# Patient Record
Sex: MALE | Race: WHITE | NOT HISPANIC OR LATINO | Employment: FULL TIME | URBAN - METROPOLITAN AREA
[De-identification: names, ages, dates, MRNs, and addresses within clinical notes are randomized per-mention and may not be internally consistent; named-entity substitution may affect disease eponyms.]

---

## 2017-03-08 ENCOUNTER — DOCUMENTATION ONLY (OUTPATIENT)
Dept: SLEEP MEDICINE | Facility: CLINIC | Age: 52
End: 2017-03-08

## 2017-03-08 NOTE — PROGRESS NOTES
Patient was offered choice of vendor and chose [facility].  Patient Bharath Mercado was set up at [place] on March 8, 2017. Patient received a Bharti [employer]ics DreamStation Auto. Pressures were set at 7-13 cm H2O.   Patient s ramp is 7 cm H2O for Off and FLEX/EPR is 2.  Patient received a Bharti [employer]ironics Mask name: NUANCE PRO  Nasal mask Size Standard, heated tubing and heated humidifier.  Patient is enrolled in the STM Program and does not need to meet compliance. Patient has a follow up on 4/28/17 with Bennett Goltz, PA.    Lora Bustos

## 2017-03-13 ENCOUNTER — DOCUMENTATION ONLY (OUTPATIENT)
Dept: SLEEP MEDICINE | Facility: CLINIC | Age: 52
End: 2017-03-13

## 2017-03-13 NOTE — PROGRESS NOTES
3 DAY STM VISIT    Patient contacted for 3 day STM visit  Message left for patient to return call    Current settings:  EPAP Min Auto CPAP: 7 (CPAP Min Auto CPAP)       EPAP Max Auto CPAP: 13 (CPAP Max Auto CPAP)       Assessment:  Most nights over four hours.   Action plan: Pt to have f/u 14 day STM visit.

## 2017-03-13 NOTE — PROGRESS NOTES
Patient returned call.    Subjective measures:  Pt reports pain from the mask  first night. He is currently using a nasal pillow mask.  He states that this has gotten better.  No issues pressures or dryness. He will call back if soreness does not continue to improve.

## 2017-03-23 ENCOUNTER — DOCUMENTATION ONLY (OUTPATIENT)
Dept: SLEEP MEDICINE | Facility: CLINIC | Age: 52
End: 2017-03-23

## 2017-03-23 NOTE — PROGRESS NOTES
14 DAY [facility] VISIT    Message left for patient to return call     Assessment: Pt not meeting objective benchmarks for AHI and compliance.  Action plan: Waiting for patient to return call and pt to have 30 day STM visit.   Device settings:    EPAP Min Auto CPAP/ASV: 7 (CPAP Min Auto CPAP/ASV)    EPAP Max Auto CPAP/ASV: 13 (CPAP Max Auto CPAP/ASV)    Avg EPAP pressure (90th %ile) 14 day average (Bharti): 8.7cm H20    Objective measures: 14 day rolling measures      % compliance greater than four hours rolling average 14 days: 15.14954521044975782 %     Average % of night in large leak Rolling Average 14 days (BHARTI): 0/100 last data upload     AHI Rolling Average 14 Day: 8.99 last data upload      Time mask on face 14 day average: 129 min          Objective measure goal  Compliance   Goal >70%  Leak   Goal < 10%  AHI  Goal < 5  Usage  Goal >240

## 2017-04-10 ENCOUNTER — DOCUMENTATION ONLY (OUTPATIENT)
Dept: SLEEP MEDICINE | Facility: CLINIC | Age: 52
End: 2017-04-10

## 2017-04-10 NOTE — PROGRESS NOTES
30 DAY STM VISIT    Unable to leave message.     Assessment: Pt not meeting objective benchmarks for AHI and compliance.    Action plan: Pt place into sleep therapy coaching group and pt to have 6 month STM visit  Patient has a follow up visit with Bennett Goltz, PA on 4/26/2017.   Device settings:    EPAP Min Auto CPAP: 7 (CPAP Min Auto CPAP    EPAP Max Auto CPAP 13 (CPAP Max Auto CPAP    Avg EPAP pressure (90th %ile) 14 day average (Bharti): 8.8cm H20    Objective measures: 14 day rolling measures        % compliance greater than four hours rolling average 14 days: 28.43011501298873957 %         Average % of night in large leak Rolling Average 14 days (BHARTI): 0.03/100 last upload      AHI Rolling Average 14 Day: 8.4 last upload       Time mask on face 14 day average: 157 min        Objective measure goal  Compliance   Goal >70%  Leak   Goal < 10%  AHI  Goal < 5  Usage  Goal >240

## 2017-04-21 ENCOUNTER — DOCUMENTATION ONLY (OUTPATIENT)
Dept: SLEEP MEDICINE | Facility: CLINIC | Age: 52
End: 2017-04-21

## 2017-04-26 ENCOUNTER — OFFICE VISIT (OUTPATIENT)
Dept: SLEEP MEDICINE | Facility: CLINIC | Age: 52
End: 2017-04-26
Payer: COMMERCIAL

## 2017-04-26 VITALS
BODY MASS INDEX: 27.83 KG/M2 | HEIGHT: 73 IN | DIASTOLIC BLOOD PRESSURE: 75 MMHG | OXYGEN SATURATION: 98 % | HEART RATE: 67 BPM | SYSTOLIC BLOOD PRESSURE: 135 MMHG | WEIGHT: 210 LBS

## 2017-04-26 DIAGNOSIS — G47.33 OSA (OBSTRUCTIVE SLEEP APNEA): Primary | ICD-10-CM

## 2017-04-26 PROCEDURE — 99214 OFFICE O/P EST MOD 30 MIN: CPT | Performed by: PHYSICIAN ASSISTANT

## 2017-04-26 NOTE — MR AVS SNAPSHOT
After Visit Summary   4/26/2017    Bharath Mercado    MRN: [id number]           Patient Information     Date Of Birth          1965        Visit Information        Provider Department      4/26/2017 1:30 PM Goltz, Bennett Ezra, PA-C [facility]        Today's Diagnoses     EFE (obstructive sleep apnea)    -  1      Care Instructions      Your BMI is Body mass index is 28.09 kg/(m^2).  Weight management is a personal decision.  If you are interested in exploring weight loss strategies, the following discussion covers the approaches that may be successful. Body mass index (BMI) is one way to tell whether you are at a healthy weight, overweight, or obese. It measures your weight in relation to your height.  A BMI of 18.5 to 24.9 is in the healthy range. A person with a BMI of 25 to 29.9 is considered overweight, and someone with a BMI of 30 or greater is considered obese. More than two-thirds of American adults are considered overweight or obese.  Being overweight or obese increases the risk for further weight gain. Excess weight may lead to heart disease and diabetes.  Creating and following plans for healthy eating and physical activity may help you improve your health.  Weight control is part of healthy lifestyle and includes exercise, emotional health, and healthy eating habits. Careful eating habits lifelong are the mainstay of weight control. Though there are significant health benefits from weight loss, long-term weight loss with diet alone may be very difficult to achieve- studies show long-term success with dietary management in less than 10% of people. Attaining a healthy weight may be especially difficult to achieve in those with severe obesity. In some cases, medications, devices and surgical management might be considered.  What can you do?  If you are overweight or obese and are interested in methods for weight loss, you should discuss this with your provider.      Consider reducing daily calorie intake by 500 calories.     Keep a food journal.     Avoiding skipping meals, consider cutting portions instead.    Diet combined with exercise helps maintain muscle while optimizing fat loss. Strength training is particularly important for building and maintaining muscle mass. Exercise helps reduce stress, increase energy, and improves fitness. Increasing exercise without diet control, however, may not burn enough calories to loose weight.       Start walking three days a week 10-20 minutes at a time    Work towards walking thirty minutes five days a week     Eventually, increase the speed of your walking for 1-2 minutes at time    In addition, we recommend that you review healthy lifestyles and methods for weight loss available through the National Institutes of Health patient information sites:  http://win.niddk.nih.gov/publications/index.htm    And look into health and wellness programs that may be available through your health insurance provider, employer, local community center, or huma club.    Weight management plan: Patient was referred to their PCP to discuss a diet and exercise plan.            Follow-ups after your visit        Follow-up notes from your care team     Return in about 3 months (around 7/26/2017) for CPAP compliance recheck.      Your next 10 appointments already scheduled     Jul 26, 2017 10:30 AM CDT   Return Sleep Patient with Bennett Ezra Goltz, PA-C   [facility] ([facility])    [address] [place] [id number] [phone]              Who to contact     If you have questions or need follow up information about today's clinic visit or your schedule please contact [facility] [place] directly at [phone].  Normal or non-critical lab and imaging results will be communicated to you by MyChart, letter or phone within 4 business days after the clinic has received the results.  "If you do not hear from us within 7 days, please contact the clinic through [employer] or phone. If you have a critical or abnormal lab result, we will notify you by phone as soon as possible.  Submit refill requests through [employer] or call your pharmacy and they will forward the refill request to us. Please allow 3 business days for your refill to be completed.          Additional Information About Your Visit        Lin[employer] Information     [employer] gives you secure access to your electronic health record. If you see a primary care provider, you can also send messages to your care team and make appointments. If you have questions, please call your primary care clinic.  If you do not have a primary care provider, please call [phone] and they will assist you.        Care EveryWhere ID     This is your Care EveryWhere ID. This could be used by other organizations to access your [place] medical records  [id number]        Your Vitals Were     Pulse Height Pulse Oximetry BMI (Body Mass Index)          67 1.842 m (6' 0.5\") 98% 28.09 kg/m2         Blood Pressure from Last 3 Encounters:   04/26/17 135/75   09/28/16 145/89   04/27/16 136/86    Weight from Last 3 Encounters:   04/26/17 95.3 kg (210 lb)   09/28/16 95.3 kg (210 lb)   04/27/16 96.6 kg (213 lb)              Today, you had the following     No orders found for display       Primary Care Provider Office Phone # Fax #    Jose Enrique [phone]       [facility] [address] [place] [address]        Thank you!     Thank you for choosing [facility]  for your care. Our goal is always to provide you with excellent care. Hearing back from our patients is one way we can continue to improve our services. Please take a few minutes to complete the written survey that you may receive in the mail after your visit with us. Thank you!             Your Updated Medication List - Protect others around you: Learn how to safely " use, store and throw away your medicines at www.disposemymeds.org.          This list is accurate as of: 4/26/17  8:37 PM.  Always use your most recent med list.                   Brand Name Dispense Instructions for use    aspirin - buffered 325 MG Tabs tablet    ASCRIPTIN     Take 650 mg by mouth daily       ketoprofen 75 MG capsule    ORUDIS    90 capsule    Take 1 capsule (75 mg) by mouth 3 times daily as needed for pain       multivitamin, therapeutic with minerals Tabs tablet      Take 1 tablet by mouth daily

## 2017-04-26 NOTE — NURSING NOTE
"Chief Complaint   Patient presents with     Sleep Problem     Follow up psg       Initial /75  Pulse 67  Ht 1.842 m (6' 0.5\")  Wt 95.3 kg (210 lb)  SpO2 98%  BMI 28.09 kg/m2 Estimated body mass index is 28.09 kg/(m^2) as calculated from the following:    Height as of this encounter: 1.842 m (6' 0.5\").    Weight as of this encounter: 95.3 kg (210 lb).  Medication Reconciliation: complete   ESS 9/24  Slime Mcbride MA      "

## 2017-04-26 NOTE — PROGRESS NOTES
Sleep Study Follow-Up Visit:    Date on this visit: 4/26/2017    Bharath Mercado comes in today for follow-up of his CPAP use for moderate EFE. He was initially seen at the [facility] for snoring and stopping breathing in his sleep for years. His medical history is significant for borderline hyperlipidemia and lumbago.      AHI was 20.2/hr, with desaturations down to 88%. He spent 0.7 minutes below 90% SpO2. RDI 24.5/hr. REM RDI 4.5/hr EFE (almost all REM was lateral). Supine RDI 37.5/hr. His non-supine RDI was 7/hr, AHI 4.7/hr (77 minutes, 36.5 minutes of REM). Periodic Limb Movement Index 23/hour, 6.6/hr were associated with arousals.   He is on auto CPAP 7-13 cm. Initially, the mask was making his nose sore. He says sometimes the CPAP goes great and sometimes it is obnoxious. He often removes it in his sleep and is not aware of it. Sometimes he may remove it at 4-5 AM, knowingly. Sometimes he feels the pressure is trying to suffocate him. He has a Nuance nasal pillows mask. He denies dry nose or mouth. He does use the humidifier. He does not snore with it. He does not notice much mask leak. He is not sure if he feels better with it.     He has not been using the CPAP since about 4/6/17. [place] after [place]. He is just starting to feel healthy again. He has also been on call a lot lately, so has a hard time knowing if he feels any better with CPAP.     The compliance data shows that from 3/8/2017 to 4/6/2017, he used the CPAP for 25/30 nights, 26.7% of nights for >4 hours.  The 90th% pressure is 8.9 cm.  The average time in large leak is 4 min.  The average nightly usage is 3:13.  The average AHI is 8.2/hr (2.2/hr are centrals, 5.1/hr are hypopneas).      Past medical/surgical history, family history, social history, medications and allergies were reviewed.      Problem List:  Patient Active Problem List    Diagnosis Date Noted     EFE (obstructive sleep apnea)  09/28/2016     Priority: Medium     Lumbago 03/20/2013     Priority: Medium     Nonallopathic lesion of lumbar region 03/20/2013     Priority: Medium     Problem list name updated by automated process. Provider to review       CARDIOVASCULAR SCREENING; LDL GOAL LESS THAN 160 10/31/2010     Priority: Medium     Esophageal reflux 05/14/2003     Priority: Medium        Impression/Plan:    (G47.33) EFE (obstructive sleep apnea)  (primary encounter diagnosis)  Comment: Usage is low for a number of reasons. He will restart using CPAP tonight. He does not need to meet compliance goals.  Plan: Resume auto CPAP 7-13 cm. We reviewed how to adjust the humidity, ramp and Flex. He was encouraged to contact me if he becomes aware of any barriers to regular CPAP use prior to the next visit.      He will follow up with me in about 3 month(s).     Twenty-five minutes spent with patient, all of which were spent face-to-face counseling, consulting, coordinating plan of care.      Bennett Goltz, PA-C    CC: No ref. provider found

## 2017-04-26 NOTE — PATIENT INSTRUCTIONS

## 2017-09-12 ENCOUNTER — DOCUMENTATION ONLY (OUTPATIENT)
Dept: SLEEP MEDICINE | Facility: CLINIC | Age: 52
End: 2017-09-12

## 2017-09-12 NOTE — PROGRESS NOTES
6 Month [facility] visit    Message left for patient to return call    Objective measures: 14 day rolling measures     Device settings:    EPAP Min Auto CPAP/ASV: 7 (CPAP Min Auto CPAP/ASV)    EPAP Max Auto CPAP/ASV: 13 (CPAP Max Auto CPAP/ASV)    Avg EPAP pressure (90th %ile) 14 day average (Bharti): 9.1cm H20    Objective measures: 14 day rolling measures      Compliance   (Goal >70%)  % compliance greater than four hours rolling average 14 days: 7 %      Leak   (Goal < 10%)  Average fraction of night in large leak Rolling Average 14 days (BHARTI):1%       AHI  (Goal < 5)  AHI Rolling Average 14 Day: 7.6      Usage  (Goal >240)  Time mask on face 14 day average: 44 min    Assessment:Pt not meeting objective benchmarks for compliance   Action plan: waiting for patient to return call.  pt to follow up per provider request (1-2 yrs)

## 2017-09-13 ENCOUNTER — OFFICE VISIT (OUTPATIENT)
Dept: SLEEP MEDICINE | Facility: CLINIC | Age: 52
End: 2017-09-13
Payer: COMMERCIAL

## 2017-09-13 VITALS
OXYGEN SATURATION: 96 % | BODY MASS INDEX: 27.98 KG/M2 | RESPIRATION RATE: 16 BRPM | SYSTOLIC BLOOD PRESSURE: 124 MMHG | HEIGHT: 72 IN | DIASTOLIC BLOOD PRESSURE: 84 MMHG | HEART RATE: 80 BPM | WEIGHT: 206.6 LBS

## 2017-09-13 DIAGNOSIS — G47.33 OSA (OBSTRUCTIVE SLEEP APNEA): Primary | ICD-10-CM

## 2017-09-13 PROCEDURE — 99214 OFFICE O/P EST MOD 30 MIN: CPT | Performed by: PHYSICIAN ASSISTANT

## 2017-09-13 NOTE — MR AVS SNAPSHOT
After Visit Summary   9/13/2017    Bharath Mercado    MRN: [id number]           Patient Information     Date Of Birth          1965        Visit Information        Provider Department      9/13/2017 10:00 AM Goltz, Bennett Ezra, PA-C [facility]        Today's Diagnoses     EFE (obstructive sleep apnea)    -  1      Care Instructions        Your BMI is Body mass index is 28.02 kg/(m^2).  Weight management is a personal decision.  If you are interested in exploring weight loss strategies, the following discussion covers the approaches that may be successful. Body mass index (BMI) is one way to tell whether you are at a healthy weight, overweight, or obese. It measures your weight in relation to your height.  A BMI of 18.5 to 24.9 is in the healthy range. A person with a BMI of 25 to 29.9 is considered overweight, and someone with a BMI of 30 or greater is considered obese. More than two-thirds of American adults are considered overweight or obese.  Being overweight or obese increases the risk for further weight gain. Excess weight may lead to heart disease and diabetes.  Creating and following plans for healthy eating and physical activity may help you improve your health.  Weight control is part of healthy lifestyle and includes exercise, emotional health, and healthy eating habits. Careful eating habits lifelong are the mainstay of weight control. Though there are significant health benefits from weight loss, long-term weight loss with diet alone may be very difficult to achieve- studies show long-term success with dietary management in less than 10% of people. Attaining a healthy weight may be especially difficult to achieve in those with severe obesity. In some cases, medications, devices and surgical management might be considered.  What can you do?  If you are overweight or obese and are interested in methods for weight loss, you should discuss this with your provider.      Consider reducing daily calorie intake by 500 calories.     Keep a food journal.     Avoiding skipping meals, consider cutting portions instead.    Diet combined with exercise helps maintain muscle while optimizing fat loss. Strength training is particularly important for building and maintaining muscle mass. Exercise helps reduce stress, increase energy, and improves fitness. Increasing exercise without diet control, however, may not burn enough calories to loose weight.       Start walking three days a week 10-20 minutes at a time    Work towards walking thirty minutes five days a week     Eventually, increase the speed of your walking for 1-2 minutes at time    In addition, we recommend that you review healthy lifestyles and methods for weight loss available through the National Institutes of Health patient information sites:  http://win.niddk.nih.gov/publications/index.htm    And look into health and wellness programs that may be available through your health insurance provider, employer, local community center, or huma club.    Weight management plan: Patient was referred to their PCP to discuss a diet and exercise plan.              Follow-ups after your visit        Follow-up notes from your care team     Return in about 3 months (around 12/13/2017) for CPAP compliance recheck.      Your next 10 appointments already scheduled     Anthony 10, 2018 10:00 AM CST   Return Sleep Patient with Bennett Ezra Goltz, PA-C   [facility] ([facility])    [address] [place] [address] [phone]              Who to contact     If you have questions or need follow up information about today's clinic visit or your schedule please contact [facility] directly at [phone].  Normal or non-critical lab and imaging results will be communicated to you by MyChart, letter or phone within 4 business days after the clinic has received the results.  If you do not hear from us within 7 days, please contact the clinic through [email or web address] or phone. If you have a critical or abnormal lab result, we will notify you by phone as soon as possible.  Submit refill requests through [email or web address] or call your pharmacy and they will forward the refill request to us. Please allow 3 business days for your refill to be completed.          Additional Information About Your Visit        [email or web address] Information     [email or web address] gives you secure access to your electronic health record. If you see a primary care provider, you can also send messages to your care team and make appointments. If you have questions, please call your primary care clinic.  If you do not have a primary care provider, please call [phone] and they will assist you.        Care EveryWhere ID     This is your Care EveryWhere ID. This could be used by other organizations to access your [facility] medical records  [id number]        Your Vitals Were     Pulse Respirations Height Pulse Oximetry BMI (Body Mass Index)       80 16 1.829 m (6') 96% 28.02 kg/m2        Blood Pressure from Last 3 Encounters:   09/13/17 124/84   04/26/17 135/75   09/28/16 145/89    Weight from Last 3 Encounters:   09/13/17 93.7 kg (206 lb 9.6 oz)   04/26/17 95.3 kg (210 lb)   09/28/16 95.3 kg (210 lb)              Today, you had the following     No orders found for display       Primary Care Provider Office Phone # Fax #    Jose Enrique [phone] [address] [place] [address]        Equal Access to Services     RICARDO CHOI : Hadii rachel saini hadcharissao Sodottie, waaxda luqadaha, qaybta kaalmada long hall. So [facility] [phone].    ATENCIÓN: Si habla español, tiene a cross disposición servicios gratuitos de asistencia lingüística. Llame al [phone].    We comply with applicable federal civil rights laws and [place] laws. We do not discriminate on the basis of race, color,  national origin, age, disability sex, sexual orientation or gender identity.            Thank you!     Thank you for choosing [facility]  for your care. Our goal is always to provide you with excellent care. Hearing back from our patients is one way we can continue to improve our services. Please take a few minutes to complete the written survey that you may receive in the mail after your visit with us. Thank you!             Your Updated Medication List - Protect others around you: Learn how to safely use, store and throw away your medicines at www.disposemymeds.org.          This list is accurate as of: 9/13/17 10:55 AM.  Always use your most recent med list.                   Brand Name Dispense Instructions for use Diagnosis    ketoprofen 75 MG capsule    ORUDIS    90 capsule    Take 1 capsule (75 mg) by mouth 3 times daily as needed for pain    Lumbar strain, subsequent encounter       multivitamin, therapeutic with minerals Tabs tablet      Take 1 tablet by mouth daily

## 2017-09-13 NOTE — PROGRESS NOTES
Sleep Study Follow-Up Visit:    Date on this visit: 9/13/2017    Bharath Mercado comes in today for follow-up of his CPAP use for moderate EFE. He was initially seen at the [facility] for snoring and stopping breathing in his sleep for years. His medical history is significant for borderline hyperlipidemia and lumbago.       AHI was 20.2/hr, with desaturations down to 88%. He spent 0.7 minutes below 90% SpO2. RDI 24.5/hr. REM RDI 4.5/hr EFE (almost all REM was lateral). Supine RDI 37.5/hr. His non-supine RDI was 7/hr, AHI 4.7/hr (77 minutes, 36.5 minutes of REM). Periodic Limb Movement Index 23/hour, 6.6/hr were associated with arousals.   He is on auto CPAP 7-13 cm. He tried using CPAP for a little while but the mask was causing pain in his face. He removes the mask in his sleep. His wife likes that he does not snore with it. He does not notice feeling better when he can keep it on all night. He feels CPAP makes him stay on his back more and that is less comfortable. He uses a nasal pillows mask and it irritates his mustache. He does not think he has mouth leak. Sometimes he feels the pressure is panic inducing and other times the pressure feels natural.     He has been paying more attention to his sleep schedule and that seems to help his energy more than anything. He sleeps in an extra 30 minutes because one of his kids is now driving so he does not have to wake early to take them to school.     The compliance data shows that he has used the CPAP for 11/30 nights, 13.3% of nights for >4 hours.  The 90th% pressure is 9.1 cm.  The average time in large leak is 22 sec.  The average nightly usage is 3:15.  The average AHI is 9/hr (2.8/hr are centrals, 5.2/hr are hypopneas).    He really does not like the idea of a dental appliance. He thinks he is sleeping on his back less. He has recently been getting some hip pain when on his side.      Past medical/surgical history, family history, social  history, medications and allergies were reviewed.      Problem List:  Patient Active Problem List    Diagnosis Date Noted     EFE (obstructive sleep apnea) 09/28/2016     Priority: Medium     Lumbago 03/20/2013     Priority: Medium     Nonallopathic lesion of lumbar region 03/20/2013     Priority: Medium     Problem list name updated by automated process. Provider to review       CARDIOVASCULAR SCREENING; LDL GOAL LESS THAN 160 10/31/2010     Priority: Medium     Esophageal reflux 05/14/2003     Priority: Medium        Impression/Plan:    (G47.33) EFE (obstructive sleep apnea)  (primary encounter diagnosis)  Comment: CPAP compliance is very low. He has a number of small excuses. He does not notice much benefit from it and it irritates his face. He also removes it in his sleep.  Plan: He will meet with [employer] to look at new masks. We looked at masks online. He was most interested in a [employer] pillows mask because it keeps the hose out of the way. He is not interested in a dental appliance and lateral sleeping hurts his hip. We talked about ENT surgeries, but I would not highly recommend them. I told him it would probably just take time to get his body used to having a mask on.       He will follow up with me in about 3 month(s).     Twenty-five minutes spent with patient, all of which were spent face-to-face counseling, consulting, coordinating plan of care.      Bennett Goltz, PA-C    CC: Jose Enrique MD

## 2017-09-13 NOTE — NURSING NOTE
Chief Complaint   Patient presents with     Sleep Problem     cpap f/u       Initial /84  Pulse 80  Resp 16  Ht 1.829 m (6')  Wt 93.7 kg (206 lb 9.6 oz)  SpO2 96%  BMI 28.02 kg/m2 Estimated body mass index is 28.02 kg/(m^2) as calculated from the following:    Height as of this encounter: 1.829 m (6').    Weight as of this encounter: 93.7 kg (206 lb 9.6 oz).  Medication Reconciliation: complete     ESS 8  Guerda Ocampo

## 2017-09-13 NOTE — PATIENT INSTRUCTIONS

## 2017-12-28 DIAGNOSIS — S39.012D LUMBAR STRAIN, SUBSEQUENT ENCOUNTER: ICD-10-CM

## 2017-12-28 NOTE — TELEPHONE ENCOUNTER
ketoprofen (ORUDIS) 75 MG capsule  Last Written Prescription Date:  10/14/2016  Last Fill Quantity: 90,   # refills: 3  Last Office Visit:09/16/2015  Future Office visit:       Routing refill request to provider for review/approval because:  Drug not on the [facility], [facility] or [facility] refill protocol or controlled substance

## 2018-01-03 RX ORDER — KETOPROFEN 75 MG/1
CAPSULE ORAL
Qty: 90 CAPSULE | Refills: 3 | Status: SHIPPED | OUTPATIENT
Start: 2018-01-03 | End: 2021-09-22

## 2018-01-03 NOTE — TELEPHONE ENCOUNTER
Routing refill request to provider for review/approval because:  PATIENT HAS NOT BEEN SEEN FOR MORE THAN 2 YEARS.    Tana TONG RN, BSN, PHN  [place] [facility] RN

## 2018-04-30 ENCOUNTER — APPOINTMENT (OUTPATIENT)
Dept: MRI IMAGING | Facility: CLINIC | Age: 53
End: 2018-04-30
Attending: EMERGENCY MEDICINE
Payer: COMMERCIAL

## 2018-04-30 ENCOUNTER — HOSPITAL ENCOUNTER (EMERGENCY)
Facility: CLINIC | Age: 53
Discharge: HOME | End: 2018-04-30
Attending: EMERGENCY MEDICINE | Admitting: EMERGENCY MEDICINE
Payer: COMMERCIAL

## 2018-04-30 VITALS
RESPIRATION RATE: 16 BRPM | SYSTOLIC BLOOD PRESSURE: 147 MMHG | DIASTOLIC BLOOD PRESSURE: 93 MMHG | HEART RATE: 69 BPM | OXYGEN SATURATION: 99 %

## 2018-04-30 DIAGNOSIS — R20.2 NUMBNESS AND TINGLING OF LEFT SIDE OF FACE: ICD-10-CM

## 2018-04-30 DIAGNOSIS — R20.0 NUMBNESS AND TINGLING OF LEFT SIDE OF FACE: ICD-10-CM

## 2018-04-30 LAB
ANION GAP SERPL CALCULATED.3IONS-SCNC: 8 MMOL/L (ref 3–14)
APTT PPP: 27 SEC (ref 22–37)
BASOPHILS # BLD AUTO: 0 10E9/L (ref 0–0.2)
BASOPHILS NFR BLD AUTO: 0.6 %
BUN SERPL-MCNC: 15 MG/DL (ref 7–30)
CALCIUM SERPL-MCNC: 8.6 MG/DL (ref 8.5–10.1)
CHLORIDE SERPL-SCNC: 104 MMOL/L (ref 94–109)
CO2 SERPL-SCNC: 26 MMOL/L (ref 20–32)
CREAT SERPL-MCNC: 1 MG/DL (ref 0.66–1.25)
DIFFERENTIAL METHOD BLD: NORMAL
EOSINOPHIL # BLD AUTO: 0.2 10E9/L (ref 0–0.7)
EOSINOPHIL NFR BLD AUTO: 2.5 %
ERYTHROCYTE [DISTWIDTH] IN BLOOD BY AUTOMATED COUNT: 14.2 % (ref 10–15)
GFR SERPL CREATININE-BSD FRML MDRD: 78 ML/MIN/1.7M2
GLUCOSE BLDC GLUCOMTR-MCNC: 75 MG/DL (ref 70–99)
GLUCOSE SERPL-MCNC: 78 MG/DL (ref 70–99)
HCT VFR BLD AUTO: 48.4 % (ref 40–53)
HGB BLD-MCNC: 16 G/DL (ref 13.3–17.7)
IMM GRANULOCYTES # BLD: 0 10E9/L (ref 0–0.4)
IMM GRANULOCYTES NFR BLD: 0.3 %
INR PPP: 0.91 (ref 0.86–1.14)
LYMPHOCYTES # BLD AUTO: 2.3 10E9/L (ref 0.8–5.3)
LYMPHOCYTES NFR BLD AUTO: 31.9 %
MCH RBC QN AUTO: 28.8 PG (ref 26.5–33)
MCHC RBC AUTO-ENTMCNC: 33.1 G/DL (ref 31.5–36.5)
MCV RBC AUTO: 87 FL (ref 78–100)
MONOCYTES # BLD AUTO: 0.5 10E9/L (ref 0–1.3)
MONOCYTES NFR BLD AUTO: 6.3 %
NEUTROPHILS # BLD AUTO: 4.2 10E9/L (ref 1.6–8.3)
NEUTROPHILS NFR BLD AUTO: 58.4 %
NRBC # BLD AUTO: 0 10*3/UL
NRBC BLD AUTO-RTO: 0 /100
PLATELET # BLD AUTO: 274 10E9/L (ref 150–450)
POTASSIUM SERPL-SCNC: 4.1 MMOL/L (ref 3.4–5.3)
RBC # BLD AUTO: 5.55 10E12/L (ref 4.4–5.9)
SODIUM SERPL-SCNC: 138 MMOL/L (ref 133–144)
TROPONIN I SERPL-MCNC: <0.015 UG/L (ref 0–0.04)
WBC # BLD AUTO: 7.1 10E9/L (ref 4–11)

## 2018-04-30 PROCEDURE — 85730 THROMBOPLASTIN TIME PARTIAL: CPT | Performed by: EMERGENCY MEDICINE

## 2018-04-30 PROCEDURE — 85025 COMPLETE CBC W/AUTO DIFF WBC: CPT | Performed by: EMERGENCY MEDICINE

## 2018-04-30 PROCEDURE — 00000146 ZZHCL STATISTIC GLUCOSE BY METER IP

## 2018-04-30 PROCEDURE — 25000128 H RX IP 250 OP 636: Performed by: EMERGENCY MEDICINE

## 2018-04-30 PROCEDURE — 93005 ELECTROCARDIOGRAM TRACING: CPT

## 2018-04-30 PROCEDURE — 99285 EMERGENCY DEPT VISIT HI MDM: CPT | Mod: 25

## 2018-04-30 PROCEDURE — 85610 PROTHROMBIN TIME: CPT | Performed by: EMERGENCY MEDICINE

## 2018-04-30 PROCEDURE — 84484 ASSAY OF TROPONIN QUANT: CPT | Performed by: EMERGENCY MEDICINE

## 2018-04-30 PROCEDURE — A9585 GADOBUTROL INJECTION: HCPCS | Performed by: RADIOLOGY

## 2018-04-30 PROCEDURE — 25000128 H RX IP 250 OP 636: Performed by: RADIOLOGY

## 2018-04-30 PROCEDURE — 70553 MRI BRAIN STEM W/O & W/DYE: CPT

## 2018-04-30 PROCEDURE — 80048 BASIC METABOLIC PNL TOTAL CA: CPT | Performed by: EMERGENCY MEDICINE

## 2018-04-30 RX ORDER — GADOBUTROL 604.72 MG/ML
10 INJECTION INTRAVENOUS ONCE
Status: COMPLETED | OUTPATIENT
Start: 2018-04-30 | End: 2018-04-30

## 2018-04-30 RX ADMIN — GADOBUTROL 10 ML: 604.72 INJECTION INTRAVENOUS at 13:00

## 2018-04-30 RX ADMIN — SODIUM CHLORIDE 500 ML: 9 INJECTION, SOLUTION INTRAVENOUS at 12:28

## 2018-04-30 ASSESSMENT — ENCOUNTER SYMPTOMS
SHORTNESS OF BREATH: 0
DIZZINESS: 0
WEAKNESS: 0
LIGHT-HEADEDNESS: 0
NAUSEA: 0
VOMITING: 0
FACIAL ASYMMETRY: 0
NUMBNESS: 1

## 2018-04-30 NOTE — ED PROVIDER NOTES
"  History     Chief Complaint:  Left-Sided Numbness    The history is provided by the patient.    Bharath Mercado is a 52 year old male who presents to the emergency department today for evaluation of left sided numbness. The patient reports at 0700 this morning, five hours prior to arrival, while brushing his teeth he felt the left side of his face go numb which he describes as from middle lips, teeth, and gums, and radiating to the left side. This \"severely\" lasted for approximately one minute, and had faded away but still doesn't feel normal. The severe numbness returned again at 0800 and again lasted for about a minute. Due to persistent facial numbness, he presents to the ED for further evaluation. Upon presentation, he states his left front tooth area \"feels funny\" compared to the right and his left face still doesn't feel back to baseline although is \"95%\" better. Additionally, he took an aspirin this morning and does not normally take it. The patient denies extremity symptoms, shortness of breath, lightheadedness, dizziness, room spinning sensation, nausea, and vomiting.     Allergies:  No Known Drug Allergies     Medications:    ketoprofen (ORUDIS) 75 MG capsule    Past Medical History:    Borderline hyperlipidemia  Obstructive sleep apnea  Lumbago  Esophageal reflux    Past Surgical History:    Appendectomy  Hernia repair - right inguinal  [place] teeth extraction    Family History:    Cerebrovascular disease  Bladder cancer  Lentigo maligna    Social History:  The patient was alone.  Smoking Status: Never  Smokeless Tobacco: Never  Alcohol Use: Yes, 2x weekly  Marital Status:        Review of Systems   Respiratory: Negative for shortness of breath.    Cardiovascular: Negative for chest pain.   Gastrointestinal: Negative for nausea and vomiting.   Neurological: Positive for numbness (left face). Negative for dizziness, facial asymmetry, weakness and light-headedness.   All other systems reviewed and " are negative.    Physical Exam     Patient Vitals for the past 24 hrs:   BP Pulse Resp SpO2   04/30/18 1404 (!) 147/93 69 16 99 %   04/30/18 1355 (!) 147/93 - - 99 %   04/30/18 1313 (!) 136/98 - - 97 %   04/30/18 1215 (!) 168/91 64 18 98 %     Physical Exam  Constitutional: Patient appears well-developed and well-nourished.   HENT:   Head: No external signs of trauma noted.  Eyes: Pupils are equal, round, and reactive to light.   Cardiovascular: Normal rate, regular rhythm, normal heart sounds and intact distal pulses.    Pulmonary/Chest: Effort normal and breath sounds normal. No respiratory distress. No wheezes noted.   Abdominal: Soft. There is no tenderness. There is no rebound.   Musculoskeletal:   No deformities appreciated   No edema noted  Neurological:    Patient is alert and oriented to person, place, and time.    Speech is fluent, cognition is normal.   CN 2-12 appear normal and intact except for a decreased left facial sensation noted most prominently on the left cheek and mandible.    RUE strength 5/5: , finger abd, wrist flex/ext, elbow flex/ext.    LUE strength 5/5: , finger abd, wrist flex/ext, elbow flex/ext.    RLE strength 5/5: ankle flex/ext, knee flex/ext, hip flex.    LLE strength 5/5: ankle flex/ext, knee flex/ext, hip flex.    Sensation equal in all 4 extremities.    No arm drift.     Cerebellar: Normal rapid alternating movements     ( finger-nose-finger, rapid pronation/supination, hand rolling)    Normal heel-to-shin   Normal gait.   Skin: Skin is warm and dry.     Emergency Department Course     ECG:  Indication: Numbness  Completed at 1229.  Read at 1234.   Normal sinus rhythm  Septal infarct, age undetermined  Abnormal ECG  No significant change compared to EKG dated 2/14/16   Rate 65 bpm. GA interval 148. QRS duration 64. QT/QTc 404/420. P-R-T axes 56 34 60.    Imaging:  Radiology findings were communicated with the patient who voiced understanding of the findings.  MR Brain  w/o & w Contrast   IMPRESSION:   1. Minimal nonspecific chronic white matter disease.  2. Nothing acute.   Report per radiology     Laboratory:  Laboratory findings were communicated with the patient who voiced understanding of the findings.  CBC: WNL. (WBC 7.1, HGB 16.0, )   BMP: AWNL (Creatinine 1.00)  INR: 0.91  PTT: 27  Troponin (Collected 1220): <0.015  Glucose by meter: 75       Interventions:  1228 NS Bolus 500mLs IV     Emergency Department Course:  Nursing notes and vitals reviewed.  The patient was sent for a MR Brain w/o & w Contrast while in the emergency department, results above.   IV was inserted and blood was drawn for laboratory testing, results above.  1214: I performed an exam of the patient as documented above.   1232: I spoke with Dr. Schroeder of the Neurology service regarding patient's presentation, findings, and plan of care.  1235: I spoke with the MRI tech.   1326: I spoke with Dr. Gonzalez of Neurology again regarding the MRI results and plan of care. He will review the MRI and call back.   1327: Patient rechecked and updated.   1353: I spoke with Dr. Gonzalez.   Findings and plan explained to the Patient. Patient discharged home with instructions regarding supportive care, medications, and reasons to return. The importance of close follow-up was reviewed.   I personally reviewed the laboratory and imaging results with the Patient and answered all related questions prior to discharge.    Impression & Plan      Medical Decision Making:  The patient presents to the ED due to left facial numbness. Please see the HPI and exam for specifics. The patient has remained well in the ED. His exam has remained stable. He notes there is some subtle sensation difference between the left and the right side and that is has improved, but not totally gone away. The rest of his neurological exam appears normal. MRI does not show anything acute. There were a couple T2 hyperintensity signals and in discussion  with neurology likely represents possible sequela from small vessel ischemic change. The patient's blood pressure has been elevated in the ED and he notes he is usually in the 130's/70's. I have encouraged to follow up closely with his primary doctor in the outpatient setting for repeat evaluation of this and if it is still high then consideration for initiation of antihypertensive medication. The patient otherwise remains well and at this time I believe he can be discharged, but I did discuss return should there be any worsening symptoms. Anticipatory guidance given prior to discharge.     Diagnosis:    ICD-10-CM    1. Numbness and tingling of left side of face R20.0     R20.2        Disposition:  Discharged to home    Scribe Disclosure:  MIMI, Avelina Pablo, am serving as a scribe at 12:12 PM on 4/30/2018 to document services personally performed by Brian Sun DO based on my observations and the provider's statements to me.     4/30/2018   [facility] EMERGENCY DEPARTMENT       Brian Sun DO  04/30/18 [id number]

## 2018-04-30 NOTE — ED AVS SNAPSHOT
" [facility] Emergency Department    [address]    [facility] [address]    Phone:  [phone]    Fax:  [phone]                                       Bharath Mercado   MRN: [id number]    Department:  [facility] Emergency Department   Date of Visit:  4/30/2018           Patient Information     Date Of Birth          1965        Your diagnoses for this visit were:     Numbness and tingling of left side of face        You were seen by Brian Sun DO.      Follow-up Information     Call Jose Enrique MD.    Specialties:  Internal Medicine, Pediatrics    Why:  To establish a follow up appointment regarding symptoms today and re-evaluation of blood pressure    Contact information:    [facility] [place] [address]  [phone]          Follow up with [facility] Emergency Department.    Specialty:  EMERGENCY MEDICINE    Why:  If symptoms worsen    Contact information:    [address]  [facility] [id number] [phone]        Discharge Instructions         Paraesthesias  Paraesthesia is a burning or prickling sensation that is sometimes felt in the hands, arms, legs or feet. It can also occur in other parts of the body. It can also feel like tingling or numbness, skin crawling, or itching. The feeling is not comfortable, but it is not painful. (The \"pins and needles\" feeling that happens when a foot or hand \"falls asleep\" is a temporary paraesthesia.)  Paraesthesias that last or come and go may be caused by medical issues that need to be treated. These include stroke, a bulging disk pressing on a nerve, a trapped nerve, vitamin deficiencies, or even certain medicines.  Tests are often done. These tests may include blood tests, X-ray, CT (computerized tomography) scan, or a muscle test (electromyography). Depending on the cause, treatment may include physical therapy.  Home care    Tell the healthcare provider " about all medicines you take. This includes prescription and over-the-counter medicines, vitamins, and herbs. Ask if any of the medicines may be causing your problems. Do not make any changes to prescription medicines without talking to your healthcare provider first.    You may be prescribed medicines to help relieve the tingling feeling or for pain. Take all medicines as directed.    A numb hand or foot may be more prone to injury. To help protect it:  ? Always use oven mitts.  ? Test water with an unaffected hand or foot.  ? Use caution when trimming nails. File sharp areas.  ? Wear shoes that fit well to avoid pressure points, blisters, and ulcers.  ? Inspect your hands and feet carefully (including the soles of your feet and between your toes) at least once a week. If you see red areas, sores, or other problems, tell your healthcare provider.  Follow-up care  Follow up with your doctor or as advised by our staff. You may need further testing or evaluation.  When to seek medical advice  Call your healthcare provider right away if any of the following occur:    Numbness or weakness of the face, one arm, or one leg    Slurred speech, confusion, trouble speaking, walking, or seeing    Severe headache, fainting spell, dizziness, or seizure    Chest, arm, neck, or upper back pain    Loss of bladder or bowel control    Open wound with redness, swelling, or pus  Date Last Reviewed: 9/25/2015 2000-2017 [employer]. [address] [place], [place] [address]. All rights reserved. This information is not intended as a substitute for professional medical care. Always follow your healthcare professional's instructions.          24 Hour Appointment Hotline       To make an appointment at any [facility], call [phone] ([phone]). If you don't have a family doctor or clinic, we will help you find one. [facility] clinics are conveniently located to serve the needs of you and your family.              Review of your medicines      Our records show that you are taking the medicines listed below. If these are incorrect, please call your family doctor or clinic.        Dose / Directions Last dose taken    ketoprofen 75 MG capsule   Commonly known as:  ORUDIS   Quantity:  90 capsule        TAKE ONE CAPSULE BY MOUTH THREE TIMES A DAY AS NEEDED FOR PAIN   Refills:  3        multivitamin, therapeutic with minerals Tabs tablet   Dose:  1 tablet        Take 1 tablet by mouth daily   Refills:  0                Procedures and tests performed during your visit     Activity: Bedrest    Basic metabolic panel    CBC with platelets differential    Dysphagia Screen    EKG 12-lead, tracing only    Glucose by meter    Glucose monitor nursing POCT    INR    MR Brain w/o & w Contrast    Notify CT that Stroke patient is in ED    Partial thromboplastin time    Pulse oximetry nursing    Troponin I    Vital signs and neuro checks      Orders Needing Specimen Collection     None      Pending Results     Date and Time Order Name Status Description    4/30/2018 1220 EKG 12-lead, tracing only Preliminary             Pending Culture Results     No orders found from 4/28/2018 to 5/1/2018.            Pending Results Instructions     If you had any lab results that were not finalized at the time of your Discharge, you can call the ED Lab Result RN at [phone]. You will be contacted by this team for any positive Lab results or changes in treatment. The nurses are available 7 days a week from 10A to 6:30P.  You can leave a message 24 hours per day and they will return your call.        Test Results From Your Hospital Stay        4/30/2018 12:38 PM      Component Results     Component Value Ref Range & Units Status    WBC 7.1 4.0 - 11.0 10e9/L Final    RBC Count 5.55 4.4 - 5.9 10e12/L Final    Hemoglobin 16.0 13.3 - 17.7 g/dL Final    Hematocrit 48.4 40.0 - 53.0 % Final    MCV 87 78 - 100 fl Final    MCH 28.8 26.5 - 33.0 pg Final    MCHC 33.1  31.5 - 36.5 g/dL Final    RDW 14.2 10.0 - 15.0 % Final    Platelet Count 274 150 - 450 10e9/L Final    Diff Method Automated Method  Final    % Neutrophils 58.4 % Final    % Lymphocytes 31.9 % Final    % Monocytes 6.3 % Final    % Eosinophils 2.5 % Final    % Basophils 0.6 % Final    % Immature Granulocytes 0.3 % Final    Nucleated RBCs 0 0 /100 Final    Absolute Neutrophil 4.2 1.6 - 8.3 10e9/L Final    Absolute Lymphocytes 2.3 0.8 - 5.3 10e9/L Final    Absolute Monocytes 0.5 0.0 - 1.3 10e9/L Final    Absolute Eosinophils 0.2 0.0 - 0.7 10e9/L Final    Absolute Basophils 0.0 0.0 - 0.2 10e9/L Final    Abs Immature Granulocytes 0.0 0 - 0.4 10e9/L Final    Absolute Nucleated RBC 0.0  Final         4/30/2018 12:57 PM      Component Results     Component Value Ref Range & Units Status    Sodium 138 133 - 144 mmol/L Final    Potassium 4.1 3.4 - 5.3 mmol/L Final    Chloride 104 94 - 109 mmol/L Final    Carbon Dioxide 26 20 - 32 mmol/L Final    Anion Gap 8 3 - 14 mmol/L Final    Glucose 78 70 - 99 mg/dL Final    Urea Nitrogen 15 7 - 30 mg/dL Final    Creatinine 1.00 0.66 - 1.25 mg/dL Final    GFR Estimate 78 >60 mL/min/1.7m2 Final    Non  GFR Calc    GFR Estimate If Black >90 >60 mL/min/1.7m2 Final    African American GFR Calc    Calcium 8.6 8.5 - 10.1 mg/dL Final         4/30/2018 12:50 PM      Component Results     Component Value Ref Range & Units Status    INR 0.91 0.86 - 1.14 Final         4/30/2018 12:50 PM      Component Results     Component Value Ref Range & Units Status    PTT 27 22 - 37 sec Final         4/30/2018 12:57 PM      Component Results     Component Value Ref Range & Units Status    Troponin I ES <0.015 0.000 - 0.045 ug/L Final    The 99th percentile for upper reference range is 0.045 ug/L.  Troponin values   in the range of 0.045 - 0.120 ug/L may be associated with risks of adverse   clinical events.           4/30/2018  1:29 PM      Narrative     MRI BRAIN WITHOUT AND WITH CONTRAST  April 30, 2018 1:08 PM    HISTORY: Left facial numbness.     TECHNIQUE: Multiplanar, multisequence MRI of the brain without and  with 10 mL Gadavist.    COMPARISON: None.    FINDINGS: There are a few tiny foci of T2 hyperintensity in the  subcortical region of each cerebral hemisphere, nonspecific but likely  due to minimal small vessel ischemic change. There is no intracranial  hemorrhage, mass, or recent infarct. There are no gadolinium enhancing  lesions.     The facial structures appear normal. The arteries at the base of the  brain and the dural venous sinuses appear patent.         Impression     IMPRESSION:   1. Minimal nonspecific chronic white matter disease.  2. Nothing acute.     JEEVAN MICHELLE MD         4/30/2018 12:26 PM      Component Results     Component Value Ref Range & Units Status    Glucose 75 70 - 99 mg/dL Final                Clinical Quality Measure: Blood Pressure Screening     Your blood pressure was checked while you were in the emergency department today. The last reading we obtained was  BP: (!) 136/98 . Please read the guidelines below about what these numbers mean and what you should do about them.  If your systolic blood pressure (the top number) is less than 120 and your diastolic blood pressure (the bottom number) is less than 80, then your blood pressure is normal. There is nothing more that you need to do about it.  If your systolic blood pressure (the top number) is 120-139 or your diastolic blood pressure (the bottom number) is 80-89, your blood pressure may be higher than it should be. You should have your blood pressure rechecked within a year by a primary care provider.  If your systolic blood pressure (the top number) is 140 or greater or your diastolic blood pressure (the bottom number) is 90 or greater, you may have high blood pressure. High blood pressure is treatable, but if left untreated over time it can put you at risk for heart attack, stroke, or kidney failure.  You should have your blood pressure rechecked by a primary care provider within the next 4 weeks.  If your provider in the emergency department today gave you specific instructions to follow-up with your doctor or provider even sooner than that, you should follow that instruction and not wait for up to 4 weeks for your follow-up visit.        Thank you for choosing [facility]       Thank you for choosing [facility] for your care. Our goal is always to provide you with excellent care. Hearing back from our patients is one way we can continue to improve our services. Please take a few minutes to complete the written survey that you may receive in the mail after you visit with us. Thank you!        [employer] Information     [employer] gives you secure access to your electronic health record. If you see a primary care provider, you can also send messages to your care team and make appointments. If you have questions, please call your primary care clinic.  If you do not have a primary care provider, please call [phone] and they will assist you.        Care EveryWhere ID     This is your Care EveryWhere ID. This could be used by other organizations to access your [facility] medical records  [id number]        Equal Access to Services     LISA CHOI : Hadlisa [place], misael jeffrey, long jean. So [facility] [phone].    ATENCIÓN: Si habla español, tiene a cross disposición servicios gratuitos de asistencia lingüística. Llame al [phone].    We comply with applicable federal civil rights laws and [place] laws. We do not discriminate on the basis of race, color, national origin, age, disability, sex, sexual orientation, or gender identity.            After Visit Summary       This is your record. Keep this with you and show to your community pharmacist(s) and doctor(s) at your next visit.

## 2018-04-30 NOTE — ED TRIAGE NOTES
"Pt presents with left facial numbness. Onset at 0700.Reports \" I was brushing my teeth and my teeth, lip, gums on my left side went numb. It lasted about one minute. It faded but didn't go totally away. Then it occurred again at 0800. Took aspirin. \" The numbness feels like it is gone 95%\" Denies HA, dizziness or room spinning.   "

## 2018-04-30 NOTE — DISCHARGE INSTRUCTIONS
"  Paraesthesias  Paraesthesia is a burning or prickling sensation that is sometimes felt in the hands, arms, legs or feet. It can also occur in other parts of the body. It can also feel like tingling or numbness, skin crawling, or itching. The feeling is not comfortable, but it is not painful. (The \"pins and needles\" feeling that happens when a foot or hand \"falls asleep\" is a temporary paraesthesia.)  Paraesthesias that last or come and go may be caused by medical issues that need to be treated. These include stroke, a bulging disk pressing on a nerve, a trapped nerve, vitamin deficiencies, or even certain medicines.  Tests are often done. These tests may include blood tests, X-ray, CT (computerized tomography) scan, or a muscle test (electromyography). Depending on the cause, treatment may include physical therapy.  Home care    Tell the healthcare provider about all medicines you take. This includes prescription and over-the-counter medicines, vitamins, and herbs. Ask if any of the medicines may be causing your problems. Do not make any changes to prescription medicines without talking to your healthcare provider first.    You may be prescribed medicines to help relieve the tingling feeling or for pain. Take all medicines as directed.    A numb hand or foot may be more prone to injury. To help protect it:  ? Always use oven mitts.  ? Test water with an unaffected hand or foot.  ? Use caution when trimming nails. File sharp areas.  ? Wear shoes that fit well to avoid pressure points, blisters, and ulcers.  ? Inspect your hands and feet carefully (including the soles of your feet and between your toes) at least once a week. If you see red areas, sores, or other problems, tell your healthcare provider.  Follow-up care  Follow up with your doctor or as advised by our staff. You may need further testing or evaluation.  When to seek medical advice  Call your healthcare provider right away if any of the following " occur:    Numbness or weakness of the face, one arm, or one leg    Slurred speech, confusion, trouble speaking, walking, or seeing    Severe headache, fainting spell, dizziness, or seizure    Chest, arm, neck, or upper back pain    Loss of bladder or bowel control    Open wound with redness, swelling, or pus  Date Last Reviewed: 9/25/2015 2000-2017 [employer]. [address] [place] [address]. All rights reserved. This information is not intended as a substitute for professional medical care. Always follow your healthcare professional's instructions.

## 2018-04-30 NOTE — ED AVS SNAPSHOT
[facility]    [address]    [facility] [address]    Phone:  [phone]    Fax:  [phone]                                       Bharath Mercado   MRN: [id number]    Department:  [facility]   Date of Visit:  4/30/2018           After Visit Summary Signature Page     I have received my discharge instructions, and my questions have been answered. I have discussed any challenges I see with this plan with the nurse or doctor.    ..........................................................................................................................................  Patient/Patient Representative Signature      ..........................................................................................................................................  Patient Representative Print Name and Relationship to Patient    ..................................................               ................................................  Date                                            Time    ..........................................................................................................................................  Reviewed by Signature/Title    ...................................................              ..............................................  Date                                                            Time

## 2018-05-01 LAB — INTERPRETATION ECG - MUSE: NORMAL

## 2018-05-22 ENCOUNTER — TELEPHONE (OUTPATIENT)
Dept: NEUROLOGY | Facility: CLINIC | Age: 53
End: 2018-05-22

## 2018-05-22 NOTE — TELEPHONE ENCOUNTER
[facility] Call Center    Phone Message    May a detailed message be left on voicemail: yes    Reason for Call: Other: Pt saw Dr Schroeder in [facility] on 4/30 and he reviewed his MRI. Pt calling in to get scheduled with him again. I attempted to schedule but did not have anything come up on his template. Please call back pt to discuss options. Thanks     Action Taken: Message routed to:  [facility] ([facility] Neuro

## 2018-05-22 NOTE — TELEPHONE ENCOUNTER
Spoke with patient and advised him that Dr. Schroeder is booked out until October 2018 for new appointments. Offered patient an appointment on 10/9/18 and to put him on the cancellation list, however patient stated he will call back. No appointment was scheduled.

## 2018-05-23 ENCOUNTER — TELEPHONE (OUTPATIENT)
Dept: PEDIATRICS | Facility: CLINIC | Age: 53
End: 2018-05-23

## 2018-05-23 NOTE — TELEPHONE ENCOUNTER
Patient called.     Still having mildly the same symptoms on left side of face. He is not able to see Neuro until October.     He would like to discuss possible steps in the meantime.     Please call back; ok to leave a message  Telephone Information:   Mobile [phone]     Tana TONG RN, BSN, PHN  [place] [facility] RN

## 2019-10-15 ASSESSMENT — ENCOUNTER SYMPTOMS
HEADACHES: 0
WEAKNESS: 0
ABDOMINAL PAIN: 0
PARESTHESIAS: 0
SHORTNESS OF BREATH: 0
JOINT SWELLING: 0
CHILLS: 0
NAUSEA: 0
FEVER: 0
COUGH: 0
SORE THROAT: 0
DYSURIA: 0
DIARRHEA: 0
HEMATURIA: 0
FREQUENCY: 0
EYE PAIN: 0
DIZZINESS: 0
PALPITATIONS: 0
HEARTBURN: 0
MYALGIAS: 0
CONSTIPATION: 0
NERVOUS/ANXIOUS: 0
HEMATOCHEZIA: 0
ARTHRALGIAS: 1

## 2019-10-16 ENCOUNTER — OFFICE VISIT (OUTPATIENT)
Dept: PEDIATRICS | Facility: CLINIC | Age: 54
End: 2019-10-16
Payer: COMMERCIAL

## 2019-10-16 VITALS
BODY MASS INDEX: 27.63 KG/M2 | SYSTOLIC BLOOD PRESSURE: 138 MMHG | RESPIRATION RATE: 16 BRPM | DIASTOLIC BLOOD PRESSURE: 78 MMHG | WEIGHT: 204 LBS | OXYGEN SATURATION: 97 % | HEART RATE: 69 BPM | TEMPERATURE: 98 F | HEIGHT: 72 IN

## 2019-10-16 DIAGNOSIS — J31.0 CHRONIC RHINITIS: ICD-10-CM

## 2019-10-16 DIAGNOSIS — Z00.00 ROUTINE GENERAL MEDICAL EXAMINATION AT A HEALTH CARE FACILITY: Primary | ICD-10-CM

## 2019-10-16 DIAGNOSIS — L30.9 DERMATITIS: ICD-10-CM

## 2019-10-16 DIAGNOSIS — G47.33 OSA (OBSTRUCTIVE SLEEP APNEA): ICD-10-CM

## 2019-10-16 LAB
ANION GAP SERPL CALCULATED.3IONS-SCNC: 10 MMOL/L (ref 3–14)
BUN SERPL-MCNC: 13 MG/DL (ref 7–30)
CALCIUM SERPL-MCNC: 8.9 MG/DL (ref 8.5–10.1)
CHLORIDE SERPL-SCNC: 104 MMOL/L (ref 94–109)
CHOLEST SERPL-MCNC: 242 MG/DL
CO2 SERPL-SCNC: 24 MMOL/L (ref 20–32)
CREAT SERPL-MCNC: 0.96 MG/DL (ref 0.66–1.25)
GFR SERPL CREATININE-BSD FRML MDRD: 90 ML/MIN/{1.73_M2}
GLUCOSE SERPL-MCNC: 91 MG/DL (ref 70–99)
HCV AB SERPL QL IA: NONREACTIVE
HDLC SERPL-MCNC: 50 MG/DL
HIV 1+2 AB+HIV1 P24 AG SERPL QL IA: NONREACTIVE
LDLC SERPL CALC-MCNC: 138 MG/DL
NONHDLC SERPL-MCNC: 192 MG/DL
POTASSIUM SERPL-SCNC: 4.1 MMOL/L (ref 3.4–5.3)
PSA SERPL-ACNC: 0.3 UG/L (ref 0–4)
SODIUM SERPL-SCNC: 138 MMOL/L (ref 133–144)
TRIGL SERPL-MCNC: 270 MG/DL

## 2019-10-16 PROCEDURE — 87389 HIV-1 AG W/HIV-1&-2 AB AG IA: CPT | Performed by: INTERNAL MEDICINE

## 2019-10-16 PROCEDURE — 80048 BASIC METABOLIC PNL TOTAL CA: CPT | Performed by: INTERNAL MEDICINE

## 2019-10-16 PROCEDURE — G0103 PSA SCREENING: HCPCS | Performed by: INTERNAL MEDICINE

## 2019-10-16 PROCEDURE — 86803 HEPATITIS C AB TEST: CPT | Performed by: INTERNAL MEDICINE

## 2019-10-16 PROCEDURE — 36415 COLL VENOUS BLD VENIPUNCTURE: CPT | Performed by: INTERNAL MEDICINE

## 2019-10-16 PROCEDURE — 80061 LIPID PANEL: CPT | Performed by: INTERNAL MEDICINE

## 2019-10-16 PROCEDURE — 99386 PREV VISIT NEW AGE 40-64: CPT | Performed by: INTERNAL MEDICINE

## 2019-10-16 RX ORDER — FLUCONAZOLE 150 MG/1
150 TABLET ORAL WEEKLY
Qty: 4 TABLET | Refills: 0 | Status: SHIPPED | OUTPATIENT
Start: 2019-10-16 | End: 2019-11-07

## 2019-10-16 RX ORDER — PSEUDOEPHEDRINE HCL 120 MG/1
120 TABLET, FILM COATED, EXTENDED RELEASE ORAL EVERY 12 HOURS
Qty: 60 TABLET | Refills: 5 | Status: SHIPPED | OUTPATIENT
Start: 2019-10-16 | End: 2022-08-17

## 2019-10-16 ASSESSMENT — ENCOUNTER SYMPTOMS
JOINT SWELLING: 0
EYE PAIN: 0
NAUSEA: 0
MYALGIAS: 0
HEARTBURN: 0
ARTHRALGIAS: 1
NERVOUS/ANXIOUS: 0
SORE THROAT: 0
CHILLS: 0
ABDOMINAL PAIN: 0
DYSURIA: 0
HEMATOCHEZIA: 0
FREQUENCY: 0
CONSTIPATION: 0
WEAKNESS: 0
DIARRHEA: 0
PALPITATIONS: 0
DIZZINESS: 0
HEMATURIA: 0
PARESTHESIAS: 0
HEADACHES: 0
COUGH: 0
FEVER: 0
SHORTNESS OF BREATH: 0

## 2019-10-16 ASSESSMENT — MIFFLIN-ST. JEOR: SCORE: 1795.4

## 2019-10-16 NOTE — PATIENT INSTRUCTIONS
Preventive Health Recommendations    Yearly exam:             See your health care provider every year in order to  o   Review health changes.   o   Discuss preventive care.      Have a cholesterol test at least every 5 years.     Have a diabetes test (fasting glucose) every 1-2 years.     Set up a colonoscopy.     Shots: Get a flu shot each year. Get a tetanus shot every 10 years.     Nutrition:    Eat at least 5 servings of fruits and vegetables daily.     Eat whole-grain bread, whole-wheat pasta and brown rice instead of white grains and rice.     Get adequate Calcium and Vitamin D.     Lifestyle    Exercise for at least 150 minutes a week (30 minutes a day, 5 days a week). This will help you control your weight and prevent disease.     Limit alcohol to one drink per day.     No smoking.     Wear sunscreen to prevent skin cancer.     See your dentist every six months for an exam and cleaning.     See your eye doctor every 1 to 2 years.

## 2019-10-16 NOTE — PROGRESS NOTES
SUBJECTIVE:   CC: Bharath Mercado is an 54 year old male who presents for preventative health visit.     Healthy Habits:     Getting at least 3 servings of Calcium per day:  Yes    Bi-annual eye exam:  Yes    Dental care twice a year:  NO    Sleep apnea or symptoms of sleep apnea:  Sleep apnea    Diet:  Regular (no restrictions)    Frequency of exercise:  1 day/week    Duration of exercise:  Less than 15 minutes    Taking medications regularly:  Yes    Medication side effects:  None    PHQ-2 Total Score: 1    Additional concerns today:  Yes    EFE. Has CPAP. Not going well. Removes most nights. discussed alternate options.     Dermatitis in the groin area. Has used HC topical in the past. Questions possible fungal cause.     Intermittent nasal congestion. Would like rx for pseudoephedrine so can get a higher quantity at a time.     Overdue for colon cancer screening. Offered FIT or Cologuard, he prefers to set up a colonoscopy.       Today's PHQ-2 Score:   PHQ-2 ( 1999 [employer]) 10/15/2019   Q1: Little interest or pleasure in doing things 0   Q2: Feeling down, depressed or hopeless 1   PHQ-2 Score 1   Q1: Little interest or pleasure in doing things Not at all   Q2: Feeling down, depressed or hopeless Several days   PHQ-2 Score 1       Abuse: Current or Past(Physical, Sexual or Emotional)- No  Do you feel safe in your environment? Yes    Social History     Tobacco Use     Smoking status: Never Smoker     Smokeless tobacco: Never Used   Substance Use Topics     Alcohol use: Yes     Alcohol/week: 0.0 standard drinks     Comment: 2 x week         Alcohol Use 10/15/2019   Prescreen: >3 drinks/day or >7 drinks/week? No   Prescreen: >3 drinks/day or >7 drinks/week? -       Last PSA:   PSA   Date Value Ref Range Status   09/16/2015 0.41 0 - 4 ug/L Final       Reviewed orders with patient. Reviewed health maintenance and updated orders accordingly - Yes      Reviewed and updated as needed this visit by Provider  Gabriella   "Allergies  Meds  Problems  Med Hx  Surg Hx  Fam Hx          Review of Systems   Constitutional: Negative for chills and fever.   HENT: Negative for congestion, ear pain, hearing loss and sore throat.    Eyes: Negative for pain and visual disturbance.   Respiratory: Negative for cough and shortness of breath.    Cardiovascular: Negative for chest pain, palpitations and peripheral edema.   Gastrointestinal: Negative for abdominal pain, constipation, diarrhea, heartburn, hematochezia and nausea.   Genitourinary: Negative for discharge, dysuria, frequency, genital sores, hematuria, impotence and urgency.   Musculoskeletal: Positive for arthralgias. Negative for joint swelling and myalgias.   Skin: Negative for rash.   Neurological: Negative for dizziness, weakness, headaches and paresthesias.   Psychiatric/Behavioral: Negative for mood changes. The patient is not nervous/anxious.        OBJECTIVE:   /78 (BP Location: Right arm, Patient Position: Chair, Cuff Size: Adult Large)   Pulse 69   Temp 98  F (36.7  C) (Oral)   Resp 16   Ht 1.816 m (5' 11.5\")   Wt 92.5 kg (204 lb)   SpO2 97%   BMI 28.06 kg/m      Physical Exam  GENERAL: healthy, alert and no distress  EYES: Eyes grossly normal to inspection, PERRL and conjunctivae and sclerae normal  HENT: ear canals and TM's normal, nose and mouth without ulcers or lesions  NECK: no adenopathy, no asymmetry, masses, or scars and thyroid normal to palpation  RESP: lungs clear to auscultation - no rales, rhonchi or wheezes  CV: regular rate and rhythm, normal S1 S2, no S3 or S4, no murmur, click or rub, no peripheral edema and peripheral pulses strong  ABDOMEN: soft, nontender, no hepatosplenomegaly, no masses and bowel sounds normal  MS: no gross musculoskeletal defects noted, no edema  SKIN: no suspicious lesions or rashes  NEURO: Normal strength and tone, mentation intact and speech normal  PSYCH: mentation appears normal, affect " "normal/bright      ASSESSMENT/PLAN:       ICD-10-CM    1. Routine general medical examination at a health care facility Z00.00 Lipid Profile (Chol, Trig, HDL, LDL calc)     Basic metabolic panel     PSA, screen     HIV Antigen Antibody Combo     Hepatitis C Screen Reflex to HCV RNA Quant and Genotype   2. EFE (obstructive sleep apnea) G47.33 OTOLARYNGOLOGY REFERRAL   3. Chronic rhinitis J31.0 pseudoePHEDrine (SUDAFED) 120 MG 12 hr tablet   4. Dermatitis L30.9 fluconazole (DIFLUCAN) 150 MG tablet       COUNSELING:   Reviewed preventive health counseling, as reflected in patient instructions    Estimated body mass index is 28.06 kg/m  as calculated from the following:    Height as of this encounter: 1.816 m (5' 11.5\").    Weight as of this encounter: 92.5 kg (204 lb).     Weight management plan: Discussed healthy diet and exercise guidelines     reports that he has never smoked. He has never used smokeless tobacco.      Jose Enrique MD  [facility]  "

## 2019-10-31 ENCOUNTER — HEALTH MAINTENANCE LETTER (OUTPATIENT)
Age: 54
End: 2019-10-31

## 2019-12-18 ENCOUNTER — TRANSFERRED RECORDS (OUTPATIENT)
Dept: HEALTH INFORMATION MANAGEMENT | Facility: CLINIC | Age: 54
End: 2019-12-18

## 2020-01-08 ENCOUNTER — OFFICE VISIT (OUTPATIENT)
Dept: UROLOGY | Facility: CLINIC | Age: 55
End: 2020-01-08
Payer: COMMERCIAL

## 2020-01-08 VITALS
HEIGHT: 72 IN | BODY MASS INDEX: 27.63 KG/M2 | HEART RATE: 76 BPM | DIASTOLIC BLOOD PRESSURE: 82 MMHG | WEIGHT: 204 LBS | SYSTOLIC BLOOD PRESSURE: 130 MMHG | OXYGEN SATURATION: 97 %

## 2020-01-08 DIAGNOSIS — N52.9 ERECTILE DYSFUNCTION, UNSPECIFIED ERECTILE DYSFUNCTION TYPE: ICD-10-CM

## 2020-01-08 DIAGNOSIS — R35.0 URINARY FREQUENCY: Primary | ICD-10-CM

## 2020-01-08 DIAGNOSIS — R31.29 MICROSCOPIC HEMATURIA: ICD-10-CM

## 2020-01-08 LAB
ALBUMIN UR-MCNC: NEGATIVE MG/DL
APPEARANCE UR: CLEAR
BILIRUB UR QL STRIP: NEGATIVE
COLOR UR AUTO: YELLOW
GLUCOSE UR STRIP-MCNC: NEGATIVE MG/DL
HGB UR QL STRIP: ABNORMAL
KETONES UR STRIP-MCNC: NEGATIVE MG/DL
LEUKOCYTE ESTERASE UR QL STRIP: NEGATIVE
NITRATE UR QL: NEGATIVE
PH UR STRIP: 5 PH (ref 5–7)
RBC #/AREA URNS AUTO: 1 /HPF (ref 0–2)
RESIDUAL VOLUME (RV) (EXTERNAL): 55
SOURCE: ABNORMAL
SP GR UR STRIP: >1.03 (ref 1–1.03)
UROBILINOGEN UR STRIP-ACNC: 0.2 EU/DL (ref 0.2–1)
WBC #/AREA URNS AUTO: 1 /HPF (ref 0–5)

## 2020-01-08 PROCEDURE — 99204 OFFICE O/P NEW MOD 45 MIN: CPT | Mod: 25 | Performed by: UROLOGY

## 2020-01-08 PROCEDURE — 51798 US URINE CAPACITY MEASURE: CPT | Performed by: UROLOGY

## 2020-01-08 PROCEDURE — 81001 URINALYSIS AUTO W/SCOPE: CPT | Performed by: UROLOGY

## 2020-01-08 RX ORDER — SILDENAFIL CITRATE 20 MG/1
20 TABLET ORAL DAILY PRN
Qty: 30 TABLET | Refills: 3 | Status: SHIPPED | OUTPATIENT
Start: 2020-01-08

## 2020-01-08 ASSESSMENT — PAIN SCALES - GENERAL: PAINLEVEL: NO PAIN (0)

## 2020-01-08 ASSESSMENT — MIFFLIN-ST. JEOR: SCORE: 1803.34

## 2020-01-08 NOTE — LETTER
1/8/2020       RE: Bharath Mercado  [address] [address]     Dear Colleague,    Thank you for referring your patient, Bharath Mercado, to the [facility] [place] [facility]. Please see a copy of my visit note below.    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  First he reports an earlier episode where he had severe dysuria/burning with urination.  He felt a great deal of pain at the tip of the penis with urination.  These symptoms came on suddenly for him and then mainly resolved but they do persist to some degree at this time.  He has also noticed a slight weakening of his urinary stream.  He has no history of gross hematuria.  No history of kidney stones or flank pain.  He does not complain of nocturia.  He does not take any prostate related medications and has never had any prior prostate surgery.    He also complains of mild erectile dysfunction.  He reports occasional inability to achieve a full erection.  He notes a slight decrease in his libido along with this.      PAST MEDICAL HISTORY:   Past Medical History:   Diagnosis Date     Borderline hyperlipidemia      Hernia, abdominal     inguinal     Palpitations     intermittent PSVT       PAST SURGICAL HISTORY:   Past Surgical History:   Procedure Laterality Date     C APPENDECTOMY       HC REPAIR ING HERNIA,5+Y/O,REDUCIBL      right inguinal     VASECTOMY         FAMILY HISTORY:   Family History   Problem Relation Age of Onset     Diabetes Paternal Aunt         type 1, childhood onset     C.A.D. Paternal Aunt      Cerebrovascular Disease Paternal Grandmother      Diabetes Paternal Grandmother         type 2      Cancer Father 68        bladder ca/lentigo maligna     C.A.D. Father 65        coronary stents     Cancer - colorectal No family hx of      Prostate Cancer No family hx of        SOCIAL HISTORY:   Social History     Tobacco Use     Smoking status: Never Smoker     Smokeless tobacco: Never Used   Substance Use Topics     Alcohol use: Yes     Alcohol/week: 0.0 standard drinks     Comment: 2 x week          Allergies   Allergen Reactions     No Known Allergies          Current Outpatient Medications:      multivitamin, therapeutic with minerals (THERA-VIT-M) TABS, Take 1 tablet by mouth daily, Disp: , Rfl:      pseudoePHEDrine (SUDAFED) 120 MG 12 hr tablet, Take 1 tablet (120 mg) by mouth every 12 hours, Disp: 60 tablet, Rfl: 5     ketoprofen (ORUDIS) 75 MG capsule, TAKE ONE CAPSULE BY MOUTH THREE TIMES A DAY AS NEEDED FOR PAIN, Disp: 90 capsule, Rfl: 3    Review Of Systems:  Skin: No rash, pruritis, or skin pigmentation  Eyes: No changes in vision  Ears/Nose/Throat: No changes in hearing, no nosebleeds  Respiratory: No shortness of breath, dyspnea on exertion, cough, or hemoptysis  Cardiovascular: No chest pain or palpitations  Gastrointestinal: No diarrhea or constipation. No abdominal pain. No hematochezia  Genitourinary: see HPI  Musculoskeletal: No pain or swelling of joints, normal range of motion  Neurologic: No weakness or tremors  Psychiatric: No recent changes in memory or mood  Hematologic/Lymphatic/Immunologic: No easy bruising or enlarged lymph nodes  Endocrine: No weight gain or loss      PHYSICAL EXAM:    /82   Pulse 76   Ht 1.829 m (6')   Wt 92.5 kg (204 lb)   SpO2 97%   BMI 27.67 kg/m     General appearance: In NAD, conversant  HEENT: Normocephalic and atraumatic, anicteric sclera  Cardiovascular: No peripheral edema  Respiratory: normal, non-labored breathing  Gastrointestinal: negative, Abdomen soft, non-tender, and non-distended.   Musculoskeletal: Normal musculature and  movements  Peripheral Vascular/extremity: No peripheral edema  Skin: Normal temperature, turgor, and texture. No rash  Psychiatric: Appropriate affect, alert and oriented to person, place, and time    Penis: Normal  Scrotal skin: Normal, no lesions  Testicles: Normal to palpation bilaterally  Epididymis: Normal to palpation bilaterally  Lymphatic: Normal inguinal lymph nodes    Digital Rectal Exam: The prostate does not feel enlarged, it is benign and symmetric to palpation.  Digital rectal examination did give him a strong urgency to urinate.    Cystoscopy: Not done      PSA: 0.3    UA RESULTS:  No results for input(s): COLOR, APPEARANCE, URINEGLC, URINEBILI, URINEKETONE, SG, UBLD, URINEPH, PROTEIN, UROBILINOGEN, NITRITE, LEUKEST, RBCU, WBCU in the last [id number] hours.    Bladder Scan: 55mL    Other Labs:      Imaging Studies: None      CLINICAL IMPRESSION:   Possible microscopic hematuria, dysuria, erectile dysfunction     PLAN:   We discussed my findings. His initial dipstick urinalysis shows moderate blood.  This was sent for microscopy to see if there is significant microscopic hematuria present.  His clinical symptoms are most consistent with either a distal ureteral stone or prostatitis.  He did not have any prostatitis on exam but exam did give him a strong urge to urinate.  I recommended that we begin his work-up with a noncontrast CT scan to look for stones.  This was ordered for him and I will contact him with those results.  If there are no stones then we may consider a course of treatment for prostatitis.  Ultimately, we will need to make certain his microscopic hematuria resolves.    We discussed treatment options for erectile dysfunction today as well.  He would like to undergo trial of Viagra.  I gave a prescription for generic Viagra along with instructions.  I will follow-up with him in the future on the success of this medication.      Navjot Hagan MD

## 2020-01-08 NOTE — NURSING NOTE
Chief Complaint   Patient presents with     Slow urine stream     Pt. is here for slow urine stream     Pt. Notes slow urine stream, has been noticed over the past 6 months to a year.    Pt. Denies hematuria  Pt. States he feels he's able to void completely.      Post Void residual result: 55 mL    Nancy Alford, EMT

## 2020-01-08 NOTE — PROGRESS NOTES
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  First he reports an earlier episode where he had severe dysuria/burning with urination.  He felt a great deal of pain at the tip of the penis with urination.  These symptoms came on suddenly for him and then mainly resolved but they do persist to some degree at this time.  He has also noticed a slight weakening of his urinary stream.  He has no history of gross hematuria.  No history of kidney stones or flank pain.  He does not complain of nocturia.  He does not take any prostate related medications and has never had any prior prostate surgery.    He also complains of mild erectile dysfunction.  He reports occasional inability to achieve a full erection.  He notes a slight decrease in his libido along with this.      PAST MEDICAL HISTORY:   Past Medical History:   Diagnosis Date     Borderline hyperlipidemia      Hernia, abdominal     inguinal     Palpitations     intermittent PSVT       PAST SURGICAL HISTORY:   Past Surgical History:   Procedure Laterality Date     C APPENDECTOMY       HC REPAIR ING HERNIA,5+Y/O,REDUCIBL      right inguinal     VASECTOMY         FAMILY HISTORY:   Family History   Problem Relation Age of Onset     Diabetes Paternal Aunt         type 1, childhood onset     C.A.D. Paternal Aunt      Cerebrovascular Disease Paternal Grandmother      Diabetes Paternal Grandmother         type 2     Cancer Father 68        bladder ca/lentigo maligna     C.A.D. Father 65        coronary stents     Cancer - colorectal No family hx of      Prostate Cancer No family hx of        SOCIAL HISTORY:   Social History     Tobacco Use     Smoking status: Never Smoker     Smokeless tobacco: Never Used   Substance Use Topics      Alcohol use: Yes     Alcohol/week: 0.0 standard drinks     Comment: 2 x week          Allergies   Allergen Reactions     No Known Allergies          Current Outpatient Medications:      multivitamin, therapeutic with minerals (THERA-VIT-M) TABS, Take 1 tablet by mouth daily, Disp: , Rfl:      pseudoePHEDrine (SUDAFED) 120 MG 12 hr tablet, Take 1 tablet (120 mg) by mouth every 12 hours, Disp: 60 tablet, Rfl: 5     ketoprofen (ORUDIS) 75 MG capsule, TAKE ONE CAPSULE BY MOUTH THREE TIMES A DAY AS NEEDED FOR PAIN, Disp: 90 capsule, Rfl: 3    Review Of Systems:  Skin: No rash, pruritis, or skin pigmentation  Eyes: No changes in vision  Ears/Nose/Throat: No changes in hearing, no nosebleeds  Respiratory: No shortness of breath, dyspnea on exertion, cough, or hemoptysis  Cardiovascular: No chest pain or palpitations  Gastrointestinal: No diarrhea or constipation. No abdominal pain. No hematochezia  Genitourinary: see HPI  Musculoskeletal: No pain or swelling of joints, normal range of motion  Neurologic: No weakness or tremors  Psychiatric: No recent changes in memory or mood  Hematologic/Lymphatic/Immunologic: No easy bruising or enlarged lymph nodes  Endocrine: No weight gain or loss      PHYSICAL EXAM:    /82   Pulse 76   Ht 1.829 m (6')   Wt 92.5 kg (204 lb)   SpO2 97%   BMI 27.67 kg/m    General appearance: In NAD, conversant  HEENT: Normocephalic and atraumatic, anicteric sclera  Cardiovascular: No peripheral edema  Respiratory: normal, non-labored breathing  Gastrointestinal: negative, Abdomen soft, non-tender, and non-distended.   Musculoskeletal: Normal musculature and movements  Peripheral Vascular/extremity: No peripheral edema  Skin: Normal temperature, turgor, and texture. No rash  Psychiatric: Appropriate affect, alert and oriented to person, place, and time    Penis: Normal  Scrotal skin: Normal, no lesions  Testicles: Normal to palpation bilaterally  Epididymis: Normal to palpation  bilaterally  Lymphatic: Normal inguinal lymph nodes    Digital Rectal Exam: The prostate does not feel enlarged, it is benign and symmetric to palpation.  Digital rectal examination did give him a strong urgency to urinate.    Cystoscopy: Not done      PSA: 0.3    UA RESULTS:  No results for input(s): COLOR, APPEARANCE, URINEGLC, URINEBILI, URINEKETONE, SG, UBLD, URINEPH, PROTEIN, UROBILINOGEN, NITRITE, LEUKEST, RBCU, WBCU in the last 32655 hours.    Bladder Scan: 55mL    Other Labs:      Imaging Studies: None      CLINICAL IMPRESSION:   Possible microscopic hematuria, dysuria, erectile dysfunction     PLAN:   We discussed my findings. His initial dipstick urinalysis shows moderate blood.  This was sent for microscopy to see if there is significant microscopic hematuria present.  His clinical symptoms are most consistent with either a distal ureteral stone or prostatitis.  He did not have any prostatitis on exam but exam did give him a strong urge to urinate.  I recommended that we begin his work-up with a noncontrast CT scan to look for stones.  This was ordered for him and I will contact him with those results.  If there are no stones then we may consider a course of treatment for prostatitis.  Ultimately, we will need to make certain his microscopic hematuria resolves.    We discussed treatment options for erectile dysfunction today as well.  He would like to undergo trial of Viagra.  I gave a prescription for generic Viagra along with instructions.  I will follow-up with him in the future on the success of this medication.      Navjot Hagan MD

## 2020-01-10 ENCOUNTER — HOSPITAL ENCOUNTER (OUTPATIENT)
Dept: CT IMAGING | Facility: CLINIC | Age: 55
Discharge: HOME | End: 2020-01-10
Attending: UROLOGY | Admitting: UROLOGY
Payer: COMMERCIAL

## 2020-01-10 DIAGNOSIS — R31.29 MICROSCOPIC HEMATURIA: ICD-10-CM

## 2020-01-10 PROCEDURE — 74176 CT ABD & PELVIS W/O CONTRAST: CPT

## 2020-01-11 DIAGNOSIS — N41.9 PROSTATITIS, UNSPECIFIED PROSTATITIS TYPE: Primary | ICD-10-CM

## 2020-01-11 RX ORDER — CIPROFLOXACIN 500 MG/1
500 TABLET, FILM COATED ORAL 2 TIMES DAILY
Qty: 28 TABLET | Refills: 0 | Status: SHIPPED | OUTPATIENT
Start: 2020-01-11 | End: 2020-01-25

## 2020-04-11 ENCOUNTER — VIRTUAL VISIT (OUTPATIENT)
Dept: FAMILY MEDICINE | Facility: OTHER | Age: 55
End: 2020-04-11

## 2020-04-11 ENCOUNTER — NURSE TRIAGE (OUTPATIENT)
Dept: NURSING | Facility: CLINIC | Age: 55
End: 2020-04-11

## 2020-04-11 NOTE — TELEPHONE ENCOUNTER
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 Caller denies any symptoms now. Triage guidelines recommend to home care Caller verbalized and understands directives. Triager advised caller to go to [facility] for virtual visit with provider concerning any symptoms.  COVID 19 Nurse Triage Plan/Patient Instructions    Please be aware that novel coronavirus (COVID-19) may be circulating in the community. If you develop symptoms such as fever, cough, or SOB or if you have concerns about the presence of another infection including coronavirus (COVID-19), please contact your health care provider or visit [email or web address].     Disposition/Instructions    Patient to have an [facility] Visit with a provider (Preferred option). Follow System Ambulatory Workflow for COVID 19.     To do this follow these instructions:    1. Go to the website [email or web address]  2. Create an account (you will need your insurance information)  3. Start a new visit  4. Choose your diagnosis (e.g. COVID19)  5. Fill out the information about your symptoms  6. A provider will reach out to you by text, phone call or video visit based on your request    Call Back If: Your symptoms worsen before you are able to complete your [facility] Visit with a provider.    Thank you for limiting contact with others, wearing a simple mask to cover your cough, practice good hand hygiene habits and accessing our virtual services where possible to limit the spread of this virus.    For more information about COVID19 and options for caring for yourself at home, please visit the CDC website at https://www.cdc.gov/coronavirus/2019-ncov/about/steps-when-sick.html  For more options for care at [facility], please visit our website at [email or web address]    For more information, please use the [place] Department of Health ([facility]) COVID-19  Hotlines (Interpreters available):     Health questions: Phone Number: [phone] or [phone] and Hours: 7 a.m. to 7 p.m.    Schools and  questions: Phone Number: [phone] or [phone] and Hours 7 a.m. to 7 p.m.                  Reason for Disposition    COVID-19, questions about    Additional Information    Negative: SEVERE difficulty breathing (e.g., struggling for each breath, speaks in single words)    Negative: Difficult to awaken or acting confused (e.g., disoriented, slurred speech)    Negative: Bluish (or gray) lips or face now    Negative: Shock suspected (e.g., cold/pale/clammy skin, too weak to stand, low BP, rapid pulse)    Negative: Sounds like a life-threatening emergency to the triager    Negative: [1] COVID-19 suspected (e.g., cough, fever, shortness of breath) AND [2] public health department recommends testing    Negative: [1] COVID-19 exposure AND [2] no symptoms    Negative: COVID-19 and Breastfeeding, questions about    Negative: SEVERE or constant chest pain (Exception: mild central chest pain, present only when coughing)    Negative: MODERATE difficulty breathing (e.g., speaks in phrases, SOB even at rest, pulse 100-120)    Negative: Patient sounds very sick or weak to the triager    Negative: MILD difficulty breathing (e.g., minimal/no SOB at rest, SOB with walking, pulse <100)    Negative: Chest pain    Negative: Fever > 103 F (39.4 C)    Negative: [1] Fever > 101 F (38.3 C) AND [2] age > 60    Negative: [1] Fever > 100.0 F (37.8 C) AND [2] bedridden (e.g., nursing home patient, CVA, chronic illness, recovering from surgery)    Negative: HIGH RISK patient (e.g., age > 64 years, diabetes, heart or lung disease, weak immune system)    Negative: Fever present > 3 days (72 hours)    Negative: [1] Fever returns after gone for over 24 hours AND [2] symptoms worse or not improved    Negative: [1] Continuous (nonstop) coughing interferes with work or school AND [2] no  improvement using cough treatment per protocol    Negative: Cough present > 3 weeks    Negative: 1] COVID-19 infection diagnosed or suspected AND [2] mild symptoms (fever, cough) AND [2] no trouble breathing or other complications    Protocols used: CORONAVIRUS (COVID-19) DIAGNOSED OR [id number] 3.30.20

## 2020-04-11 NOTE — PROGRESS NOTES
"Date: 2020 04:36:28  Clinician: Irene Kang  Clinician NPI: [id number]  Patient: Bharath Mercado  Patient : 1965  Patient Address: [address] [place] [id number]  Patient Phone: [phone]  Visit Protocol: URI  Patient Summary:  Bharath is a 54 year old ( : 1965 ) male who initiated a Visit for COVID-19 (Coronavirus) evaluation and screening. When asked the question \"Please sign me up to receive news, health information and promotions from [employer].\", Bharath responded \"No\".    Bharath states his symptoms started 1-2 days ago.   Bharath denies having rhinitis, facial pain or pressure, myalgias, enlarged lymph nodes, chills, diarrhea, vomiting, nausea, teeth pain, headache, fever, wheezing, sore throat, cough, nasal congestion, malaise, and ear pain. He also denies taking antibiotic medication for the symptoms and having recent facial or sinus surgery in the past 60 days. He is not experiencing dyspnea.    Pertinent COVID-19 (Coronavirus) information  Bharath has not traveled internationally or to the areas where COVID-19 (Coronavirus) is widespread, including cruise ship travel in the last 14 days before the start of his symptoms.   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   Bharath has not had a close contact with a laboratory-confirmed COVID-19 patient within 14 days of symptom onset. He has had a close contact with a suspected COVID-19 patient within 14 days of symptom onset. He was not exposed at his work. Additional information about contact with COVID-19 (Coronavirus) patient as reported by the patient (free text): My 15 year old daughter developed cough, chest tightness " and mild shortness of breath 12 hours ago.  She has no fever.   Triage Point(s) temporarily suspended for COVID-19 (Coronavirus) screening  Bharath reported the following symptoms which were previously protocol referral points. These protocol referral points have temporarily been removed for purposes of COVID-19 (Coronavirus) screening.   Denied all URI symptoms   Pertinent medical history  Bharath does not need a return to work/school note.   Weight: 205 lbs   Bharath does not smoke or use smokeless tobacco.   Weight: 205 lbs    MEDICATIONS: No current medications, ALLERGIES: NKDA  Clinician Response:  Dear Bharath,      Based on the information you have provided about potential exposure to Coronavirus (Covid-19) but without any symptoms of the virus (cough, fever, shortness of breath are the most common symptoms), it does not appear you need Coronavirus (COVID-19) testing at this time.   But your possible exposure to Coronavirus means that we do recommend self-isolation for 14 days from the last day you may have been exposed.   What does this mean?  Isolate Yourself:   Isolate yourself at home.   Do Not allow any visitors  Do Not go to work or school  Do Not go to [employer],  centers, shopping, or other public places.  Do Not shake hands.  Avoid close contact with others (hugging, kissing).   Protect Others:   Cover Your Mouth and Nose with a mask, disposable tissue or wash cloth to avoid spreading germs to others.  Wash your hands and face frequently with soap and water.   If you have not developed a cough with fever by day 15 of isolation you are considered uninfected.  If you have a high risk medical condition such as cancer, heart failure, end stage renal disease on dialysis or have a transplant and you develop a cough or fever please reach out to your specialty clinic to obtain further instruction on how this should be evaluated.   Thank you for limiting contact with others, wearing a simple mask to  cover your cough, practice good hand hygiene habits and accessing our virtual services where possible to limit the spread of this virus.  For more information about COVID19 and options for caring for yourself at home, please visit the CDC website at https://www.cdc.gov/coronavirus/2019-ncov/about/steps-when-sick.html  For more options for care at [facility], please visit our website at [email or web address]    Diagnosis: Cough  Diagnosis ICD: R05  Additional Clinician Notes: Please contact your Employee Occupational Health Department for return to work and testing information. This is our current isolation guidelines however guidelines are changing frequently.

## 2020-07-29 ENCOUNTER — TRANSFERRED RECORDS (OUTPATIENT)
Dept: HEALTH INFORMATION MANAGEMENT | Facility: CLINIC | Age: 55
End: 2020-07-29

## 2020-10-26 ENCOUNTER — RESULTS ONLY (OUTPATIENT)
Dept: LAB | Age: 55
End: 2020-10-26

## 2020-10-26 DIAGNOSIS — Z53.9 ERRONEOUS ENCOUNTER--DISREGARD: Primary | ICD-10-CM

## 2020-10-26 LAB
SARS-COV-2 RNA SPEC QL NAA+PROBE: NORMAL
SPECIMEN SOURCE: NORMAL

## 2020-10-27 LAB
LABORATORY COMMENT REPORT: NORMAL
SARS-COV-2 RNA SPEC QL NAA+PROBE: NEGATIVE
SPECIMEN SOURCE: NORMAL

## 2021-01-15 ENCOUNTER — HEALTH MAINTENANCE LETTER (OUTPATIENT)
Age: 56
End: 2021-01-15

## 2021-09-05 ENCOUNTER — HEALTH MAINTENANCE LETTER (OUTPATIENT)
Age: 56
End: 2021-09-05

## 2021-09-06 ENCOUNTER — HOSPITAL ENCOUNTER (EMERGENCY)
Facility: CLINIC | Age: 56
Discharge: HOME | End: 2021-09-06
Attending: PHYSICIAN ASSISTANT | Admitting: PHYSICIAN ASSISTANT
Payer: COMMERCIAL

## 2021-09-06 VITALS
HEART RATE: 90 BPM | DIASTOLIC BLOOD PRESSURE: 96 MMHG | TEMPERATURE: 99.3 F | RESPIRATION RATE: 18 BRPM | OXYGEN SATURATION: 99 % | SYSTOLIC BLOOD PRESSURE: 157 MMHG

## 2021-09-06 DIAGNOSIS — Z20.822 ENCOUNTER FOR LABORATORY TESTING FOR COVID-19 VIRUS: ICD-10-CM

## 2021-09-06 LAB — SARS-COV-2 RNA RESP QL NAA+PROBE: POSITIVE

## 2021-09-06 PROCEDURE — C9803 HOPD COVID-19 SPEC COLLECT: HCPCS

## 2021-09-06 PROCEDURE — 87635 SARS-COV-2 COVID-19 AMP PRB: CPT | Performed by: PHYSICIAN ASSISTANT

## 2021-09-06 PROCEDURE — 99283 EMERGENCY DEPT VISIT LOW MDM: CPT

## 2021-09-07 ENCOUNTER — TELEPHONE (OUTPATIENT)
Dept: EMERGENCY MEDICINE | Facility: CLINIC | Age: 56
End: 2021-09-07

## 2021-09-07 NOTE — ED PROVIDER NOTES
Patient left after triage and swab was obtained and set to lab by support staff. He was not evaluated.      Jevon Valencia PA-C  09/06/21 5902

## 2021-09-07 NOTE — ED PROVIDER NOTES
History     Chief Complaint:  Covid 19 Testing       HPI   Bhaarth Mercado is a 55 year old male with history of borderline hyperlipidemia who presents with ***.    Review of Systems  ***    Allergies:  The patient has no known allergies.     Medications:  Orudis  Sudafed  Revatio    Past Medical History:    Borderline hyperlipidemia  Abdominal hernia  Esophageal reflux    Past Surgical History:    Appendectomy  Vasectomy  Right inguinal hernia repair    Family History:    Father: bladder cancer, CAD    Social History:  Patient presents to the ED alone.  Patient walked to the ED.    Physical Exam     Patient Vitals for the past 24 hrs:   BP Temp Temp src Pulse Resp SpO2   09/06/21 2138 (!) 157/96 -- -- -- -- --   09/06/21 2136 -- 99.3  F (37.4  C) Temporal 90 18 99 %       Physical Exam  ***    Emergency Department Course     Laboratory:    Asymptomatic COVID19 Virus PCR by nasopharyngeal swab positive (A)    Emergency Department Course:    Reviewed:  I reviewed nursing notes, vitals, past medical history, and care everywhere    Assessments:  2200 I obtained history and examined the patient as noted above.     Disposition:  The patient was discharged to home.     Impression & Plan     Medical Decision Making:  ***     Covid-19  Bharath Mercado was evaluated during a global COVID-19 pandemic, which necessitated consideration that the patient might be at risk for infection with the SARS-CoV-2 virus that causes COVID-19.   Applicable protocols for evaluation were followed during the patient's care.   COVID-19 was considered as part of the patient's evaluation. The plan for testing is:  a test was obtained during this visit.    Diagnosis:    ICD-10-CM    1. Encounter for laboratory testing for COVID-19 virus  Z20.822        Scribe Disclosure:  Brittney LE, am serving as a scribe at 10:33 PM on 9/6/2021 to document services personally performed by Eddie Crow MD based on my observations and the provider's  statements to me.

## 2021-09-07 NOTE — ED TRIAGE NOTES
Patient presents to the ED requesting a COVID test. States has a mild cough and has had a low grade fever.  took a home antigen COVID test that was positive.

## 2021-09-07 NOTE — TELEPHONE ENCOUNTER
"Coronavirus (COVID-19) Notification    Caller Name (Patient, parent, daughter/son, grandparent, etc)  patient    Reason for call  Notify of Positive Coronavirus (COVID-19) lab results, assess symptoms,  review  [employer] [facility] recommendations    Lab Result    Lab test:  2019-nCoV rRt-PCR or SARS-CoV-2 PCR    Oropharyngeal AND/OR nasopharyngeal swabs is POSITIVE for 2019-nCoV RNA/SARS-COV-2 PCR (COVID-19 virus)    RN Recommendations/Instructions per [facility] Coronavirus COVID-19 recommendations    Brief introduction script  Introduce self then review script:  \"I am calling on behalf of [employer].  We were notified that your Coronavirus test (COVID-19) for was POSITIVE for the virus.  I have some information to relay to you but first I wanted to mention that the [place] Dept of Health will be contacting you shortly [it's possible MD already called Patient] to talk to you more about how you are feeling and other people you have had contact with who might now also have the virus.  Also,  [employer] is Partnering with the [facility] for Covid-19 research, you may be contacted directly by research staff.\"    Patient reports being fully vaccinated for Covid with Pfizer.    Assessment (Inquire about Patient's current symptoms)   Assessment   Current Symptoms at time of phone call: (if no symptoms, document No symptoms] Fatigue, body aches, hoarse voice, headache, nasal congestion, chills, cough   Symptoms onset (if applicable) 9/6/2021     If at time of call, Patients symptoms hare worsened, the Patient should contact 911 or have someone drive them to Emergency Dept promptly:      If Patient calling 911, inform 911 personal that you have tested positive for the Coronavirus (COVID-19).  Place mask on and await 911 to arrive.    If Emergency Dept, If possible, please have another adult drive you to the Emergency Dept but you need to wear mask when in contact with other people.      Monoclonal Antibody " All messages have been received with no further communication.    Will close encounter at this time.    "Administration    You may be eligible to receive a new treatment with a monoclonal antibody for preventing hospitalization in patients at high risk for complications from COVID-19.   This medication is still experimental and available on a limited basis; it is given through an IV and must be given at an infusion center. Please note that not all people who are eligible will receive the medication since it is in limited supply.     Are you interested in being considered for this medication?  Yes.   Is the patient symptomatic?  Yes. Is the patient 18 years of age or older? Yes.  Is the patient newly (within the last week) on supplemental oxygen or requiring more oxygen than usual?  No. Patient criteria for selection: Is the patients weight equal to or greater than 40 kg (88 lbs)? Yes.  Is the patient's age 65 years or older?  No. Is the patient 55 years or older and have one or more of the following conditions: Hypertension, CHF, COPD/Chronic pulmonary disease?  No. Is the patient 18 years or older and has one or more of the following conditions: Chronic Kidney Disease, Diabetes Mellitus, BMI equal to or greater than 35, Immunosuppressive Disease or taking Immunosuppressive medications?  No.  Patient does not qualify.  Does the patient fit the criteria: No    If patient qualifies based on above criteria:  \"You will be contacted if you are selected to receive this treatment in the next 1-2 business days.   This is time sensitive and if you are not selected in the next 1-2 business days, you will not receive the medication.  If you do not receive a call to schedule, you have not been selected.\"      Review information with Patient    Your result was positive. This means you have COVID-19 (coronavirus).  We have sent you a letter that reviews the information that I'll be reviewing with you now.    How can I protect others?    If you have symptoms: stay home and away from others (self-isolate) until:    You've had no " fever--and no medicine that reduces fever--for 1 full day (24 hours). And       Your other symptoms have gotten better. For example, your cough or breathing has improved. And     At least 10 days have passed since your symptoms started. (If you've been told by a doctor that you have a weak immune system, wait 20 days.)     If you don't have symptoms: Stay home and away from others (self-isolate) until at least 10 days have passed since your first positive COVID-19 test. (Date test collected)    During this time:    Stay in your own room, including for meals. Use your own bathroom if you can.    Stay away from others in your home. No hugging, kissing or shaking hands. No visitors.     Don't go to work, school or anywhere else.     Clean  high touch  surfaces often (doorknobs, counters, handles, etc.). Use a household cleaning spray or wipes. You'll find a full list on the EPA website at www.epa.gov/pesticide-registration/list-n-disinfectants-use-against-sars-cov-2.     Cover your mouth and nose with a mask, tissue or other face covering to avoid spreading germs.    Wash your hands and face often with soap and water.    Make a list of people you have been in close contact with recently, even if either of you wore a face covering.   ; Start your list from 2 days before you became ill or had a positive test.  ; Include anyone that was within 6 feet of you for a cumulative total of 15 minutes or more in 24 hours. (Example: if you sat next to Gerardo for 5 minutes in the morning and 10 minutes in the afternoon, then you were in close contact for 15 minutes total that day. Gerardo would be added to your list.)    A public health worker will call or text you. It is important that you answer. They will ask you questions about possible exposures to COVID-19, such as people you have been in direct contact with and places you have visited.    Tell the people on your list that you have COVID-19; they should stay away from others for  14 days starting from the last time they were in contact with you (unless you are told something different from a public health worker).     Caregivers in these groups are at risk for severe illness due to COVID-19:  o People 65 years and older  o People who live in a nursing home or long-term care facility  o People with chronic disease (lung, heart, cancer, diabetes, kidney, liver, immunologic)  o People who have a weakened immune system, including those who:  - Are in cancer treatment  - Take medicine that weakens the immune system, such as corticosteroids  - Had a bone marrow or organ transplant  - Have an immune deficiency  - Have poorly controlled HIV or AIDS  - Are obese (body mass index of 40 or higher)  - Smoke regularly    Caregivers should wear gloves while washing dishes, handling laundry and cleaning bedrooms and bathrooms.    Wash and dry laundry with special caution. Don't shake dirty laundry, and use the warmest water setting you can.    If you have a weakened immune system, ask your doctor about other actions you should take.    For more tips, go to www.cdc.gov/coronavirus/2019-ncov/downloads/10Things.pdf.    You should not go back to work until you meet the guidelines above for ending your home isolation. You don't need to be retested for COVID-19 before going back to work--studies show that you won't spread the virus if it's been at least 10 days since your symptoms started (or 20 days, if you have a weak immune system).    Employers: This document serves as formal notice of your employee's medical guidelines for going back to work. They must meet the above guidelines before going back to work in person.    How can I take care of myself?    1. Get lots of rest. Drink extra fluids (unless a doctor has told you not to).    2. Take Tylenol (acetaminophen) for fever or pain. If you have liver or kidney problems, ask your family doctor if it's okay to take Tylenol.     Take either:     650 mg (two 325  mg pills) every 4 to 6 hours, or     1,000 mg (two 500 mg pills) every 8 hours as needed.     Note: Don't take more than 3,000 mg in one day. Acetaminophen is found in many medicines (both prescribed and over-the-counter medicines). Read all labels to be sure you don't take too much.    For children, check the Tylenol bottle for the right dose (based on their age or weight).    3. If you have other health problems (like cancer, heart failure, an organ transplant or severe kidney disease): Call your specialty clinic if you don't feel better in the next 2 days.    4. Know when to call 911: Emergency warning signs include:    Trouble breathing or shortness of breath    Pain or pressure in the chest that doesn't go away    Feeling confused like you haven't felt before, or not being able to wake up    Bluish-colored lips or face    5. Sign up for [employer]. We know it's scary to hear that you have COVID-19. We want to track your symptoms to make sure you're okay over the next 2 weeks. Please look for an email from [employer]--this is a free, online program that we'll use to keep in touch. To sign up, follow the link in the email. Learn more at [email or web address].    Where can I get more information?    [facility]: [email or web address]    Coronavirus Basics: www.health.[facility].[place]/diseases/coronavirus/basics.html    What to Do If You're Sick: www.cdc.gov/coronavirus/2019-ncov/about/steps-when-sick.html    Ending Home Isolation: www.cdc.gov/coronavirus/2019-ncov/hcp/disposition-in-home-patients.html     Caring for Someone with COVID-19: www.cdc.gov/coronavirus/2019-ncov/if-you-are-sick/care-for-someone.html     [facility] clinical trials (COVID-19 research studies): clinicalaffairs.[facility]-clinical-trials     A Positive COVID-19 letter will be sent via [employer] or the mail. (Exception, no letters sent to Presurgerical/Preprocedure Patients)    Didi Wilkerson,  LPN

## 2021-09-13 ENCOUNTER — MYC MEDICAL ADVICE (OUTPATIENT)
Dept: PEDIATRICS | Facility: CLINIC | Age: 56
End: 2021-09-13

## 2021-09-14 ENCOUNTER — APPOINTMENT (OUTPATIENT)
Dept: GENERAL RADIOLOGY | Facility: CLINIC | Age: 56
End: 2021-09-14
Attending: EMERGENCY MEDICINE
Payer: OTHER MISCELLANEOUS

## 2021-09-14 ENCOUNTER — NURSE TRIAGE (OUTPATIENT)
Dept: PEDIATRICS | Facility: CLINIC | Age: 56
End: 2021-09-14

## 2021-09-14 ENCOUNTER — HOSPITAL ENCOUNTER (EMERGENCY)
Facility: CLINIC | Age: 56
Discharge: HOME | End: 2021-09-14
Attending: EMERGENCY MEDICINE | Admitting: EMERGENCY MEDICINE
Payer: OTHER MISCELLANEOUS

## 2021-09-14 VITALS
DIASTOLIC BLOOD PRESSURE: 93 MMHG | HEART RATE: 76 BPM | OXYGEN SATURATION: 96 % | SYSTOLIC BLOOD PRESSURE: 150 MMHG | TEMPERATURE: 98 F | RESPIRATION RATE: 16 BRPM

## 2021-09-14 DIAGNOSIS — U07.1 PNEUMONIA DUE TO 2019 NOVEL CORONAVIRUS: ICD-10-CM

## 2021-09-14 DIAGNOSIS — J12.82 PNEUMONIA DUE TO 2019 NOVEL CORONAVIRUS: ICD-10-CM

## 2021-09-14 DIAGNOSIS — R06.09 DYSPNEA ON EXERTION: ICD-10-CM

## 2021-09-14 DIAGNOSIS — R03.0 ELEVATED BLOOD PRESSURE READING WITHOUT DIAGNOSIS OF HYPERTENSION: ICD-10-CM

## 2021-09-14 LAB
ANION GAP SERPL CALCULATED.3IONS-SCNC: 7 MMOL/L (ref 3–14)
ATRIAL RATE - MUSE: 76 BPM
BASOPHILS # BLD AUTO: 0 10E3/UL (ref 0–0.2)
BASOPHILS NFR BLD AUTO: 0 %
BUN SERPL-MCNC: 14 MG/DL (ref 7–30)
CALCIUM SERPL-MCNC: 8.7 MG/DL (ref 8.5–10.1)
CHLORIDE BLD-SCNC: 102 MMOL/L (ref 94–109)
CO2 SERPL-SCNC: 27 MMOL/L (ref 20–32)
CREAT SERPL-MCNC: 0.92 MG/DL (ref 0.66–1.25)
D DIMER PPP FEU-MCNC: 0.5 UG/ML FEU (ref 0–0.5)
DIASTOLIC BLOOD PRESSURE - MUSE: NORMAL MMHG
EOSINOPHIL # BLD AUTO: 0 10E3/UL (ref 0–0.7)
EOSINOPHIL NFR BLD AUTO: 0 %
ERYTHROCYTE [DISTWIDTH] IN BLOOD BY AUTOMATED COUNT: 14.1 % (ref 10–15)
GFR SERPL CREATININE-BSD FRML MDRD: >90 ML/MIN/1.73M2
GLUCOSE BLD-MCNC: 91 MG/DL (ref 70–99)
HCT VFR BLD AUTO: 46.6 % (ref 40–53)
HGB BLD-MCNC: 15.6 G/DL (ref 13.3–17.7)
IMM GRANULOCYTES # BLD: 0 10E3/UL
IMM GRANULOCYTES NFR BLD: 0 %
INTERPRETATION ECG - MUSE: NORMAL
LYMPHOCYTES # BLD AUTO: 1 10E3/UL (ref 0.8–5.3)
LYMPHOCYTES NFR BLD AUTO: 14 %
MCH RBC QN AUTO: 29.2 PG (ref 26.5–33)
MCHC RBC AUTO-ENTMCNC: 33.5 G/DL (ref 31.5–36.5)
MCV RBC AUTO: 87 FL (ref 78–100)
MONOCYTES # BLD AUTO: 0.3 10E3/UL (ref 0–1.3)
MONOCYTES NFR BLD AUTO: 4 %
NEUTROPHILS # BLD AUTO: 6.1 10E3/UL (ref 1.6–8.3)
NEUTROPHILS NFR BLD AUTO: 82 %
NRBC # BLD AUTO: 0 10E3/UL
NRBC BLD AUTO-RTO: 0 /100
P AXIS - MUSE: 17 DEGREES
PLATELET # BLD AUTO: 187 10E3/UL (ref 150–450)
POTASSIUM BLD-SCNC: 3.6 MMOL/L (ref 3.4–5.3)
PR INTERVAL - MUSE: 138 MS
QRS DURATION - MUSE: 64 MS
QT - MUSE: 384 MS
QTC - MUSE: 432 MS
R AXIS - MUSE: 41 DEGREES
RBC # BLD AUTO: 5.35 10E6/UL (ref 4.4–5.9)
SODIUM SERPL-SCNC: 136 MMOL/L (ref 133–144)
SYSTOLIC BLOOD PRESSURE - MUSE: NORMAL MMHG
T AXIS - MUSE: 68 DEGREES
VENTRICULAR RATE- MUSE: 76 BPM
WBC # BLD AUTO: 7.5 10E3/UL (ref 4–11)

## 2021-09-14 PROCEDURE — 96361 HYDRATE IV INFUSION ADD-ON: CPT

## 2021-09-14 PROCEDURE — 250N000013 HC RX MED GY IP 250 OP 250 PS 637: Performed by: EMERGENCY MEDICINE

## 2021-09-14 PROCEDURE — 258N000003 HC RX IP 258 OP 636: Performed by: EMERGENCY MEDICINE

## 2021-09-14 PROCEDURE — 36415 COLL VENOUS BLD VENIPUNCTURE: CPT | Performed by: EMERGENCY MEDICINE

## 2021-09-14 PROCEDURE — 96360 HYDRATION IV INFUSION INIT: CPT

## 2021-09-14 PROCEDURE — 85379 FIBRIN DEGRADATION QUANT: CPT | Performed by: EMERGENCY MEDICINE

## 2021-09-14 PROCEDURE — 99285 EMERGENCY DEPT VISIT HI MDM: CPT | Mod: 25

## 2021-09-14 PROCEDURE — 71045 X-RAY EXAM CHEST 1 VIEW: CPT

## 2021-09-14 PROCEDURE — 80048 BASIC METABOLIC PNL TOTAL CA: CPT | Performed by: EMERGENCY MEDICINE

## 2021-09-14 PROCEDURE — 85025 COMPLETE CBC W/AUTO DIFF WBC: CPT | Performed by: EMERGENCY MEDICINE

## 2021-09-14 PROCEDURE — 93005 ELECTROCARDIOGRAM TRACING: CPT

## 2021-09-14 RX ORDER — BENZONATATE 100 MG/1
100 CAPSULE ORAL ONCE
Status: COMPLETED | OUTPATIENT
Start: 2021-09-14 | End: 2021-09-14

## 2021-09-14 RX ORDER — SODIUM CHLORIDE 9 MG/ML
INJECTION, SOLUTION INTRAVENOUS CONTINUOUS
Status: DISCONTINUED | OUTPATIENT
Start: 2021-09-14 | End: 2021-09-14 | Stop reason: HOSPADM

## 2021-09-14 RX ADMIN — SODIUM CHLORIDE 1000 ML: 9 INJECTION, SOLUTION INTRAVENOUS at 12:35

## 2021-09-14 RX ADMIN — BENZONATATE 100 MG: 100 CAPSULE ORAL at 12:37

## 2021-09-14 ASSESSMENT — ENCOUNTER SYMPTOMS
COUGH: 1
FEVER: 1
SHORTNESS OF BREATH: 1

## 2021-09-14 NOTE — ED TRIAGE NOTES
Pt here with c/o SOB and generalized weakness since tested positive for COVID 9/6. No fevers at home. Sating 90-97% on RA at home. ABC intact. A&Ox4.

## 2021-09-14 NOTE — DISCHARGE INSTRUCTIONS
Discharge Instructions  COVID-19    COVID-19 is the disease caused by a new coronavirus. The virus spreads from person-to-person primarily by droplets when an infected person coughs or sneezes and the droplets are then breathed in by another person. There are tests available to diagnose COVID-19. You may have been diagnosed with COVID, may be being tested for COVID and have a pending test result, or may have been exposed to COVID.    Symptoms of COVID-19  Many people have no symptoms or mild symptoms.  Symptoms may usually appear 4 to 5 days (up to 14 days) after contact with a person with COVID-19. Some people will get severe symptoms and pneumonia. Usual symptoms are:     ? Fever  ? Cough  ? Trouble breathing    Less common symptoms are: Headache, body aches, sore throat, sneezing, diarrhea, loss of taste or smell.    Isolation and Quarantine    You may have been seen because you have symptoms, had an exposure, or had some other concern about possible COVID. The best way to stop the spread of the virus is to avoid contact with others.    Isolation refers to sick people staying away from people who are not sick. A person in quarantine is limiting activity because they were exposed and are waiting to see if they might become sick.    If you test positive for COVID, you should stay home (isolation) for at least 10 days after your symptoms began, and for 24 hours with no fever and improvement of symptoms--whichever is longer. (Your fever should be gone for 24 hours without using fever-reducing medicine). If you have no symptoms, you should stay home (isolation) for 10 days from the day of the test. If you have been vaccinated for COVID, the vaccination will not cause you to test positive so a positive test result generally is a  true positive .    For example, if you have a fever and cough for 6 days, you need to stay home 4 more days with no fever for a total of 10 days. Or, if you have a fever and cough for 10 days,  you need to stay home one more day with no fever for a total of 11 days.    If you have a high-risk exposure to COVID (you spent 15 minutes or more within six feet of somebody who has COVID), you should stay home (quarantine) for 14 days, unless you are vaccinated. Even if you test negative for COVID, the CDC recommends a 14-day quarantine from the time of your last exposure to that individual (unless you are vaccinated). There are options for a shortened (<14 day quarantine) you can review at:  [email or web address]/[email or web address]/close.html#long    If you live in the same house as somebody with COVID and cannot separate from them, you will need to quarantine for 14-days after that person's isolation (infectious) period. That means that you may need to quarantine for 24-days after that person became symptomatic/ill.    If you are vaccinated and do not develop symptoms, you do not need to quarantine after exposure.    If you have symptoms but a negative test, you should stay at home until you are symptom-free and without fever for 24 hours, using the same judgment you would for when it is safe to return to work/school from strep throat, influenza, or the common cold. If you worsen, you should consider being re-evaluated.    If you are being tested for COVID because of symptoms and your test is pending, you should stay home until you know your test result.    If I have COVID, how should I protect myself and others?    Do not go to work or school. Have a friend or relative do your shopping. Do not use public transportation (bus, train) or ridesharing (Lyft, Uber).    Separate yourself from other people in your home. As much as possible, you should stay in one room and away from other people in your home. Also, use a separate bathroom, if possible. Avoid handling pets or other animals while sick.     Wear a facemask if you need to be around other people and cover your mouth and nose with a tissue when  you cough or sneeze.     Avoid sharing personal household items. You should not share dishes, drinking glasses, forks/knives/spoons, towels, or bedding with other people in your home. After using these items, they should be washed with soap and water. Clean parts of your home that are touched often (doorknobs, faucets, countertops, etc.) daily.     Wash your hands often with soap and water for at least 20 seconds or use an alcohol-based hand  containing at least 60% alcohol.     Avoid touching your face.    Treat your symptoms. You can take Acetaminophen (Tylenol) to treat body aches and fever as needed for comfort. Ibuprofen (Advil or Motrin) can be used as well if you still have symptoms after taking Tylenol. Drink fluids. Rest.    Watch for worsening symptoms such as shortness of breath/difficulty breathing or very severe weakness.    Employers/workplaces are being asked by the Centers for Disease Control (CDC) to not request notes/documentation for you to return to work or prove that you were ill. You may choose to show your employer this paperwork. Also, repeat testing should not be required to return to work.    Exercise/Sports in rare cases, COVID could affect your heart in a way that makes exercise or participation in sports dangerous.    If you have a mild COVID illness (fever, cough, sore throat, and similar symptoms but no difficulty breathing or abnormalities of the lung): After your COVID symptoms have resolved, wait 14-days before returning to activity.  If you have more than a mild illness (meaning that you have problems with your breathing or lungs) or if you participate in competitive or strenuous activity or have a history of heart disease: Please see your primary doctor/provider prior to return to activity/competition.    Antibody treatments are available for patients with mild to moderate COVID illness in order to prevent severe illness. In general, only patients with risk factors for  severe illness are eligible for treatment. For more information, to see if you are eligible, and to find treatment, go to the [facility]:  [email or web address]/[email or web address]     Return to the Emergency Department if:    If you are developing worsening breathing, shortness of breath, or feel worse you should seek medical attention.  If you are uncertain, contact your health care provider/clinic. If you need emergency medical attention, call 911 and tell them you have been ill.       Discharge Instructions  Hypertension - High Blood Pressure    During you visit to the Emergency Department, your blood pressure was higher than the recommended blood pressure.  This may be related to stress, pain, medication or other temporary conditions. In these cases, your blood pressure may return to normal on its own. If you have a history of high blood pressure, you may need to have your provider adjust your medications. Sometimes, your high measurement here may indicate that you have developed high blood pressure that will stay high unless it is treated. As a general rule, high blood pressure causes problems over years rather than days, weeks, or months. So, while it is important to treat blood pressure, it is rarely important to treat blood pressure immediately. Occasionally we will begin a medication in the Emergency Department; more often we will recommend close follow-up for medications with a primary doctor/clinic.    Generally, every Emergency Department visit should have a follow-up clinic visit with either a primary or a specialty clinic/provider. Please follow-up as instructed by your emergency provider today.    Return to the Emergency Department if you start to have:  A severe headache.  Chest pain.  Shortness of breath.  Weakness or numbness that affects one part of the body.  Confusion.  Vision changes.  Significant swelling of legs and/or eyes.  A reaction to any  medication started in the Emergency Department.    What can I do to help myself?  Avoid alcohol.  Take any blood pressure medicine that you are prescribed.  Get a good night s sleep.  Lower your salt intake.  Exercise.  Lose weight.  Manage stress.  See your doctor regularly    If blood pressure medication was started in the Emergency Department:  The medicine may not have an immediate effect. The body and brain determine what blood pressure you have. The medicine s job is to retrain the body s  thermostat  to a lower blood pressure.  You will need to follow up with your provider to see how this medicine is working for you.  If you were given a prescription for medicine here today, be sure to read all of the information (including the package insert) that comes with your prescription.  This will include important information about the medicine, its side effects, and any warnings that you need to know about.  The pharmacist who fills the prescription can provide more information and answer questions you may have about the medicine.  If you have questions or concerns that the pharmacist cannot address, please call or return to the Emergency Department.   Remember that you can always come back to the Emergency Department if you are not able to see your regular provider in the amount of time listed above, if you get any new symptoms, or if there is anything that worries you.

## 2021-09-14 NOTE — ED PROVIDER NOTES
"  History     Chief Complaint:    Covid Concern      HPI   Bharath Mercado is a 55 year old male who presents with concerns for shortness of breath with exertion, progressively worsening, in the setting of known Covid infection.  He was diagnosed on 9/6/2021.  His symptoms included cough with subjective fever and sweating chills.  He notes he seemed to get better for a couple of days and then symptoms progressively worsened to the point where after doing minimal tasks he was excessively fatigued and short of breath.  He still has a cough which is nonproductive.  He denies associated chest pain aside from a \"tickle\".  He has not measured a fever but has felt feverish.  He has been eating less than normal but drinking fluids.  His urine has been darker in color.  He denies any stool changes.  He notes he has been sleeping over 18 hours a day.  He denies leg swelling.  He notes a change in taste.  He notes he was vaccinated for COVID.    Review of Systems   Constitutional: Positive for fever.   Respiratory: Positive for cough and shortness of breath.    Cardiovascular: Negative for leg swelling.   All other systems reviewed and are negative.      Allergies:  No Known Allergies      Medications:    ketoprofen   multivitamin, therapeutic with minerals  pseudoephedrine   sildenafil     Past Medical History:    Borderline hyperlipidemia  Hernia, abdominal  Palpitations  EFE  Lumbago  Nonallopathic legion of lumbar region  Esophageal reflux    Past Surgical History:    C appendectomy  HC repair hernia, reducible  Vasectomy    Family History:    Diabetes type 1  CAD  Cerebrovascular disease  Bladder Cancer    Social History:  Here alone.  .  Non-smoker.    Physical Exam     Patient Vitals for the past 24 hrs:   BP Temp Temp src Pulse Resp SpO2   09/14/21 1345 (!) 170/88 -- -- 80 19 98 %   09/14/21 1315 (!) 154/96 -- -- 76 12 96 %   09/14/21 1300 (!) 144/87 -- -- 75 17 98 %   09/14/21 1245 (!) 137/95 -- -- 74 21 96 % "   09/14/21 1215 -- -- -- -- -- 95 %   09/14/21 1200 -- -- -- -- -- 95 %   09/14/21 1040 (!) 135/91 98  F (36.7  C) Oral 83 16 97 %       Physical Exam  General: Adult male sitting upright  Eyes: PERRL, Conjunctive within normal limits.  No scleral icterus.  ENT: Moist mucous membranes, oropharynx clear.   CV: Normal S1S2, no murmur, rub or gallop. Regular rate and rhythm  Resp: Clear to auscultation bilaterally, no wheezes, rales or rhonchi. Normal respiratory effort.  Recurrent dry cough noted.  GI: Abdomen is soft, nontender and nondistended. No palpable masses. No rebound or guarding.  MSK: No edema. Nontender. Normal active range of motion.  No calf asymmetry or tenderness.  Skin: Warm and dry. No rashes or lesions or ecchymoses on visible skin.  Neuro: Alert and oriented. Responds appropriately to all questions and commands. No focal findings appreciated. Normal muscle tone.  Psych: Normal mood and affect. Pleasant.    Emergency Department Course   ECG:  ECG taken at 1239, ECG read at 1242  Normal sinus rhythm. Normal ECG.   No previous ECg to compare.  Rate 76 bpm. SC interval 138 ms. QRS duration 64 ms. QT/QTc 384/432 ms. P-R-T axes 17 41 68.     Imaging:  XR chest port 1 view:  IMPRESSION: Mild to moderate interstitial and groundglass infiltrates,   right worse than left..  Per radiology.      Laboratory:  BMP:  o/w WNL (Creatinine 0.92)   CBC: WBC 7.5, HGB 15.6,     D Dimer quantitative: 0.50    Emergency Department Course:    Reviewed:  I reviewed nursing notes, past history and care everywhere    Assessments:  1142 I obtained history and examined the patient as noted above.   1425 I rechecked the patient and explained findings.     Interventions:  Tessalon 100mg po  NS 1L IV bolus    Disposition:  The patient was discharged to home.    Impression & Plan      Medical Decision Making:    Bharath Mercado is a 55 year old male who presents for evaluation of cough, shortness of breath primarily on  exertion with excessive sleeping in the setting of known Covid.  He has evidence of Covid pneumonia on x-ray. Overall He looks well and I see no evidence of bacterial pneumonia, myocarditis or acute CHF.  Doubt pulmonary embolism with a normal D-dimer and lack of chest pain or ACS. Doubt serious bacterial infection.   He ambulated without hypoxia.  No indication for admission.  Has pulse oxymeter at home.  Will return if worsening.  As needed virtual follow-up with PCP.  All questions were answered prior to discharge.    He was incidentally noted to have high blood pressure.  He has no known history of high blood pressure but is aware that he should have this followed up with his PCP for further discussion.    Covid-19  Bharath Mercado was evaluated during a global COVID-19 pandemic, which necessitated consideration that the patient might be at risk for infection with the SARS-CoV-2 virus that causes COVID-19.   Applicable protocols for evaluation were followed during the patient's care.   COVID-19 was considered as part of the patient's evaluation. The plan for testing is:   a test was obtained at a previous visit and reviewed & considered today. Positive    Diagnosis:    ICD-10-CM    1. Pneumonia due to 2019 novel coronavirus  U07.1     J12.82    2. Dyspnea on exertion  R06.00    3. Elevated blood pressure reading without diagnosis of hypertension  R03.0            Scribe Disclosure:  Taylor LE MD, am serving as a scribe at 11:44 AM on 9/14/2021 to document services personally performed by Taylor Caldwell MD based on my observations and the provider's statements to me.      Taylor Caldwell MD  09/14/21 [id number]

## 2021-09-14 NOTE — ED NOTES
Pt ambulated in room with pulse ox. Pt's O2 remained between 96-97%, HR remained in upper 80s. Pt noticeably breathing heavier while ambulating. Pt complained of increase work in breathing and SOB while ambulating. Pt explained increase in fatigue, but denied increase in dizziness while ambulating. MD notified

## 2021-09-14 NOTE — TELEPHONE ENCOUNTER
"Patient has an oximeter at home. While at rest on the phone patient's O2 reading was 91% with deep breathing and coughing patient's O2 reading went up to 96%. After talking for a little while patient's O2 dropped back down to 93%. Patient also noticed that he has been needing to take deeper breaths then normal and increased fatigue.     Requested patient be seen in the ED.     Reason for Disposition    MILD difficulty breathing (e.g., minimal/no SOB at rest, SOB with walking, pulse <100)    Answer Assessment - Initial Assessment Questions  1. COVID-19 DIAGNOSIS: \"Who made your Coronavirus (COVID-19) diagnosis?\" \"Was it confirmed by a positive lab test?\" If not diagnosed by a HCP, ask \"Are there lots of cases (community spread) where you live?\" (See [facility] health department website, if unsure)      Tested positive on 9/6/21 on at [facility]  2. COVID-19 EXPOSURE: \"Was there any known exposure to COVID before the symptoms began?\" CDC Definition of close contact: within 6 feet (2 meters) for a total of 15 minutes or more over a 24-hour period.       unknown  3. ONSET: \"When did the COVID-19 symptoms start?\"       Started on 9/6/21  4. WORST SYMPTOM: \"What is your worst symptom?\" (e.g., cough, fever, shortness of breath, muscle aches)      Fatigue  5. COUGH: \"Do you have a cough?\" If so, ask: \"How bad is the cough?\"        Intermittent   6. FEVER: \"Do you have a fever?\" If so, ask: \"What is your temperature, how was it measured, and when did it start?\"      afebrile  7. RESPIRATORY STATUS: \"Describe your breathing?\" (e.g., shortness of breath, wheezing, unable to speak)       O2% ranging 90-97% (while on the phone o2 sat 92% with deep breathing brought up to 96% and pulse 83)   8. BETTER-SAME-WORSE: \"Are you getting better, staying the same or getting worse compared to yesterday?\"  If getting worse, ask, \"In what way?\"      Slightly better today then yesterday  9. HIGH RISK DISEASE: \"Do you have any chronic medical " "problems?\" (e.g., asthma, heart or lung disease, weak immune system, obesity, etc.)      none  10. PREGNANCY: \"Is there any chance you are pregnant?\" \"When was your last menstrual period?\"        N/A   11. OTHER SYMPTOMS: \"Do you have any other symptoms?\"  (e.g., chills, fatigue, headache, loss of smell or taste, muscle pain, sore throat; new loss of smell or taste especially support the diagnosis of COVID-19)        Chills, fatigue, intermittent headache, alteration of taste.    Protocols used: CORONAVIRUS (COVID-19) DIAGNOSED OR UYTZIPVDY-C-AC 3.25    Stacy Velazco RN on 9/14/2021 at 9:13 AM    "

## 2021-09-21 ENCOUNTER — VIRTUAL VISIT (OUTPATIENT)
Dept: PEDIATRICS | Facility: CLINIC | Age: 56
End: 2021-09-21
Payer: OTHER MISCELLANEOUS

## 2021-09-21 DIAGNOSIS — U07.1 INFECTION DUE TO 2019 NOVEL CORONAVIRUS: Primary | ICD-10-CM

## 2021-09-21 DIAGNOSIS — R05.9 COUGH: ICD-10-CM

## 2021-09-21 DIAGNOSIS — G93.31 POSTVIRAL FATIGUE SYNDROME: ICD-10-CM

## 2021-09-21 PROCEDURE — 99213 OFFICE O/P EST LOW 20 MIN: CPT | Mod: GT | Performed by: INTERNAL MEDICINE

## 2021-09-21 NOTE — PROGRESS NOTES
Bharath is a 56 year old who is being evaluated via a billable video visit.        Video Start Time: 2:50 PM    Assessment & Plan       ICD-10-CM    1. Infection due to 2019 novel coronavirus  U07.1    2. Postviral fatigue syndrome  G93.3    3. Cough  R05      Significant symptoms related to COVID-19 infection.  Has prevented him from working since sx onset 9/6/21.   Remains unable to work at least the remainder of this week.    He will send an update as to his condition at the end of the week.    Otherwise supportive cares, gradual return to normal daily activities as tolerated.     Return in about 2 months (around 11/21/2021) for Routine Visit.    Jose Enrique MD  [facility]    Fax for occupational health: [phone]    No work until at least Monday 9/27      Subjective   Bharath is a 56 year old who presents for the following health issues     HPI   COVID infection  Dx with COVID-19 on 9/6/21.  Did receive 2 doses of Pfizer vaccine in December and January.  Developed fairly severe symptoms.  Fatigue. Slept up to 22 hours per day for the first few days of the infection.  Cough, SOB, MENDOSA.    Evaluated in ED on 9/14/21.   CXR showed:  Recent Results (from the past 744 hour(s))   XR Chest Port 1 View    Narrative    CHEST ONE VIEW  9/14/2021 2:01 PM     HISTORY: Dyspnea    COMPARISON: 2/14/2016      Impression    IMPRESSION: Mild to moderate interstitial and groundglass infiltrates,  right worse than left.    BRE FISHER MD         SYSTEM ID:  [id number]     Taste was significantly altered.  Not eating much.  Lost 15# since onset of illness.    Over the past few days, sx are starting to improve.    Still SOB w/ exertion.  Is able to walk down stairs, around the block then back home. Is SOB on return.    Fatigue, is improving. Only 2 naps today so far.     Appetite is improving as well.    Discussed options for RTW. Is not medically able to return this week.   Potentially will be ready next  week depending on symptom progression.     Review of Systems         Objective           Vitals:  No vitals were obtained today due to virtual visit.    Physical Exam   GEN: No distress. Appears thinner than at his last visit.   SKIN: No visible rashes  E: No eye drainage or icterus  ENT: No nasal discharge noted  LUNGS: intermittent cough          Video-Visit Details    Type of service:  Video Visit    Video End Time:3:11 PM    Originating Location (pt. Location): Home    Distant Location (provider location):  [facility] [place]     Platform used for Video Visit: [employer]

## 2021-09-21 NOTE — LETTER
2021    Re:  Bharath Mercado  :  1965    Fax: [phone]          To Whom It May Concern:    I examined Bharath Mercado on 2021.      He continues to have symptoms related to a COVID-19 infection. Specifically he is still experiencing significant fatigue, cough and shortness of breath with exertion.    At the current time, he remains unable to return to work.    His symptoms are improving to the point where he may be able to return to work on Monday, 2021. An updated workability note will be sent prior to that date if he is able to return to work.        Cordially,          Jose Enrique MD

## 2021-09-24 ENCOUNTER — MYC MEDICAL ADVICE (OUTPATIENT)
Dept: PEDIATRICS | Facility: CLINIC | Age: 56
End: 2021-09-24

## 2021-09-24 NOTE — LETTER
2021    Re:  Bharath Mercado  :  1965    Occupational health fax: [phone]            To Whom It May Concern:    Bharath Mercado may return to work on Monday, 2021.    He should not take call for the first week of his return, but after October 3, 2021 he does not have restrictions.          Cordially,          Jose Enrique MD

## 2021-10-13 ENCOUNTER — MYC MEDICAL ADVICE (OUTPATIENT)
Dept: PEDIATRICS | Facility: CLINIC | Age: 56
End: 2021-10-13

## 2021-10-13 DIAGNOSIS — R61 NIGHT SWEATS: ICD-10-CM

## 2021-10-13 DIAGNOSIS — R05.9 COUGH: Primary | ICD-10-CM

## 2021-10-13 NOTE — TELEPHONE ENCOUNTER
Dr. Enrique-  Can you please review his MC? When should he be evaluated again? Carlee Looney RN on 10/13/2021 at 4:05 PM

## 2021-10-14 ENCOUNTER — LAB (OUTPATIENT)
Dept: LAB | Facility: CLINIC | Age: 56
End: 2021-10-14
Attending: INTERNAL MEDICINE
Payer: OTHER MISCELLANEOUS

## 2021-10-14 ENCOUNTER — HOSPITAL ENCOUNTER (OUTPATIENT)
Dept: GENERAL RADIOLOGY | Facility: CLINIC | Age: 56
End: 2021-10-14
Attending: INTERNAL MEDICINE
Payer: OTHER MISCELLANEOUS

## 2021-10-14 DIAGNOSIS — R05.9 COUGH: ICD-10-CM

## 2021-10-14 DIAGNOSIS — R61 NIGHT SWEATS: ICD-10-CM

## 2021-10-14 LAB
BASOPHILS # BLD AUTO: 0.1 10E3/UL (ref 0–0.2)
BASOPHILS NFR BLD AUTO: 1 %
EOSINOPHIL # BLD AUTO: 0.2 10E3/UL (ref 0–0.7)
EOSINOPHIL NFR BLD AUTO: 2 %
ERYTHROCYTE [DISTWIDTH] IN BLOOD BY AUTOMATED COUNT: 15.4 % (ref 10–15)
HCT VFR BLD AUTO: 47.6 % (ref 40–53)
HGB BLD-MCNC: 14.9 G/DL (ref 13.3–17.7)
IMM GRANULOCYTES # BLD: 0 10E3/UL
IMM GRANULOCYTES NFR BLD: 0 %
LYMPHOCYTES # BLD AUTO: 2.4 10E3/UL (ref 0.8–5.3)
LYMPHOCYTES NFR BLD AUTO: 27 %
MCH RBC QN AUTO: 28.4 PG (ref 26.5–33)
MCHC RBC AUTO-ENTMCNC: 31.3 G/DL (ref 31.5–36.5)
MCV RBC AUTO: 91 FL (ref 78–100)
MONOCYTES # BLD AUTO: 0.6 10E3/UL (ref 0–1.3)
MONOCYTES NFR BLD AUTO: 7 %
NEUTROPHILS # BLD AUTO: 5.7 10E3/UL (ref 1.6–8.3)
NEUTROPHILS NFR BLD AUTO: 63 %
NRBC # BLD AUTO: 0 10E3/UL
NRBC BLD AUTO-RTO: 0 /100
PLATELET # BLD AUTO: 244 10E3/UL (ref 150–450)
RBC # BLD AUTO: 5.25 10E6/UL (ref 4.4–5.9)
WBC # BLD AUTO: 9 10E3/UL (ref 4–11)

## 2021-10-14 PROCEDURE — 71046 X-RAY EXAM CHEST 2 VIEWS: CPT

## 2021-10-14 PROCEDURE — 36415 COLL VENOUS BLD VENIPUNCTURE: CPT

## 2021-10-14 PROCEDURE — 85025 COMPLETE CBC W/AUTO DIFF WBC: CPT

## 2022-02-20 ENCOUNTER — HEALTH MAINTENANCE LETTER (OUTPATIENT)
Age: 57
End: 2022-02-20

## 2022-05-10 ENCOUNTER — VIRTUAL VISIT (OUTPATIENT)
Dept: PEDIATRICS | Facility: CLINIC | Age: 57
End: 2022-05-10
Payer: COMMERCIAL

## 2022-05-10 DIAGNOSIS — F41.9 ANXIETY: ICD-10-CM

## 2022-05-10 DIAGNOSIS — U09.9 POST-COVID-19 CONDITION: Primary | ICD-10-CM

## 2022-05-10 PROCEDURE — 99213 OFFICE O/P EST LOW 20 MIN: CPT | Mod: 95 | Performed by: INTERNAL MEDICINE

## 2022-05-10 RX ORDER — ESCITALOPRAM OXALATE 10 MG/1
10 TABLET ORAL DAILY
Qty: 30 TABLET | Refills: 2 | Status: SHIPPED | OUTPATIENT
Start: 2022-05-10 | End: 2022-07-05

## 2022-05-10 NOTE — PROGRESS NOTES
Sanjay is a 56 year old who is being evaluated via a billable video visit.       Video Start Time: 4:18 PM    Assessment & Plan       ICD-10-CM    1. Post-COVID-19 condition  U09.9 Adult Post Covid Clinic Referral   2. Anxiety  F41.9 escitalopram (LEXAPRO) 10 MG tablet     Cardiopulmonary sx persisting since his COVID infection last fall.  Reviewed options.  Post-COVID clinic referral signed.    Anxiety. May be related to COVID as well.  Is working with a therapist.   Medications may be helpful.   Rx for Lexapro sent in.  If works well, can change future fills to 90 day supply.     Jose Enrique MD  [facility] [place]    Subjective   Sanjay is a 56 year old who presents for the following health issues     HPI     Ongoing symptoms since COVID infection last fall.  He notes ongoing difficulty with exertion. Will become winded after climbing a flight or two of stairs. Decreased exercise tolerance. More fatigue than he had prior to the infection.   He has tried to increase his activity but is struggling. Interested in additional evaluation.    Also having a significant increase in anxiety over the past few months, again since COVID diagnosis last fall. Prior to the infection, did not have anxiety issues. He is working with a therapist on a regular basis. We discussed additional treatment options.          Objective           Vitals:  No vitals were obtained today due to virtual visit.    Physical Exam   GEN: No distress  SKIN: No visible rashes  LUNGS: No active cough, wheezing.   PSYCH: Normal affect. Well groomed.             Video-Visit Details    Type of service:  Video Visit    Video End Time:4:29 PM    Originating Location (pt. Location): Home    Distant Location (provider location):  [facility] [place]     Platform used for Video Visit: [employer]

## 2022-06-04 DIAGNOSIS — F41.9 ANXIETY: ICD-10-CM

## 2022-06-06 ENCOUNTER — TELEPHONE (OUTPATIENT)
Dept: PHYSICAL MEDICINE AND REHAB | Facility: CLINIC | Age: 57
End: 2022-06-06

## 2022-06-06 RX ORDER — ESCITALOPRAM OXALATE 10 MG/1
10 TABLET ORAL DAILY
Qty: 30 TABLET | Refills: 2 | OUTPATIENT
Start: 2022-06-06

## 2022-06-06 ASSESSMENT — ANXIETY QUESTIONNAIRES
1. FEELING NERVOUS, ANXIOUS, OR ON EDGE: NEARLY EVERY DAY
GAD7 TOTAL SCORE: 9
7. FEELING AFRAID AS IF SOMETHING AWFUL MIGHT HAPPEN: SEVERAL DAYS
8. IF YOU CHECKED OFF ANY PROBLEMS, HOW DIFFICULT HAVE THESE MADE IT FOR YOU TO DO YOUR WORK, TAKE CARE OF THINGS AT HOME, OR GET ALONG WITH OTHER PEOPLE?: VERY DIFFICULT
GAD7 TOTAL SCORE: 9
6. BECOMING EASILY ANNOYED OR IRRITABLE: NOT AT ALL
2. NOT BEING ABLE TO STOP OR CONTROL WORRYING: MORE THAN HALF THE DAYS
5. BEING SO RESTLESS THAT IT IS HARD TO SIT STILL: SEVERAL DAYS
7. FEELING AFRAID AS IF SOMETHING AWFUL MIGHT HAPPEN: SEVERAL DAYS
4. TROUBLE RELAXING: SEVERAL DAYS
GAD7 TOTAL SCORE: 9
3. WORRYING TOO MUCH ABOUT DIFFERENT THINGS: SEVERAL DAYS

## 2022-06-06 ASSESSMENT — ENCOUNTER SYMPTOMS
POSTURAL DYSPNEA: 0
SORE THROAT: 0
POLYPHAGIA: 0
HEMOPTYSIS: 0
WHEEZING: 0
CHILLS: 0
FEVER: 0
JOINT SWELLING: 0
BACK PAIN: 1
PANIC: 1
SINUS CONGESTION: 0
SMELL DISTURBANCE: 0
DEPRESSION: 0
DECREASED APPETITE: 0
HYPERTENSION: 0
TROUBLE SWALLOWING: 0
MYALGIAS: 0
COUGH DISTURBING SLEEP: 0
SPUTUM PRODUCTION: 0
WEIGHT GAIN: 0
ARTHRALGIAS: 1
HYPOTENSION: 0
PALPITATIONS: 0
HALLUCINATIONS: 0
LIGHT-HEADEDNESS: 0
TASTE DISTURBANCE: 0
HOARSE VOICE: 0
WEIGHT LOSS: 0
ALTERED TEMPERATURE REGULATION: 0
NECK MASS: 0
INSOMNIA: 0
SHORTNESS OF BREATH: 1
SINUS PAIN: 0
SNORES LOUDLY: 1
ORTHOPNEA: 0
NECK PAIN: 0
INCREASED ENERGY: 1
STIFFNESS: 0
DECREASED CONCENTRATION: 1
MUSCLE CRAMPS: 0
LEG PAIN: 0
DYSPNEA ON EXERTION: 1
SLEEP DISTURBANCES DUE TO BREATHING: 0
FATIGUE: 1
MUSCLE WEAKNESS: 1
COUGH: 0
NIGHT SWEATS: 0
POLYDIPSIA: 0
NERVOUS/ANXIOUS: 1
EXERCISE INTOLERANCE: 1
SYNCOPE: 0

## 2022-06-08 ENCOUNTER — VIRTUAL VISIT (OUTPATIENT)
Dept: PHYSICAL MEDICINE AND REHAB | Facility: CLINIC | Age: 57
End: 2022-06-08
Attending: INTERNAL MEDICINE
Payer: OTHER MISCELLANEOUS

## 2022-06-08 DIAGNOSIS — U09.9 POST-COVID CHRONIC DYSPNEA: Primary | ICD-10-CM

## 2022-06-08 DIAGNOSIS — G93.32 POST-COVID CHRONIC FATIGUE: ICD-10-CM

## 2022-06-08 DIAGNOSIS — R06.09 POST-COVID CHRONIC DYSPNEA: Primary | ICD-10-CM

## 2022-06-08 DIAGNOSIS — U09.9 POST-COVID-19 CONDITION: ICD-10-CM

## 2022-06-08 DIAGNOSIS — M62.81 MUSCLE WEAKNESS (GENERALIZED): ICD-10-CM

## 2022-06-08 DIAGNOSIS — U09.9 POST-COVID CHRONIC FATIGUE: ICD-10-CM

## 2022-06-08 PROCEDURE — 99204 OFFICE O/P NEW MOD 45 MIN: CPT | Mod: 95 | Performed by: INTERNAL MEDICINE

## 2022-06-08 ASSESSMENT — ENCOUNTER SYMPTOMS
POLYDIPSIA: 0
COUGH: 0
DECREASED CONCENTRATION: 1
SHORTNESS OF BREATH: 1
JOINT SWELLING: 0
PALPITATIONS: 0
POLYPHAGIA: 0
FATIGUE: 1
ARTHRALGIAS: 1
BACK PAIN: 1
SORE THROAT: 0
SINUS PAIN: 0
NERVOUS/ANXIOUS: 1
FEVER: 0
TROUBLE SWALLOWING: 0
CHILLS: 0
WHEEZING: 0
HALLUCINATIONS: 0
MYALGIAS: 0
LIGHT-HEADEDNESS: 0
NECK PAIN: 0

## 2022-06-08 NOTE — PROGRESS NOTES
Sanjay is a 56 year old who is being evaluated via a billable video visit.     Patient denies any changes since echeck-in regarding medication and allergies and states all information entered during echeck-in remains accurate.    How would you like to obtain your AVS? MyChart  If the video visit is dropped, the invitation should be resent by: Text to cell phone: [phone]  Will anyone else be joining your video visit? No      Video Start Time: 9:34 AM    Assessment & Plan     Post-COVID-19 condition  Discussed resolution of symptoms following COVID-19 with patient.  Advised on rehab based approach.  - Adult Post Covid Clinic Referral    Post-COVID chronic dyspnea  Reviewed possible causes of dyspnea following COVID-19, including residual parenchymal lung disease.  Recommend pulmonary function testing as well as consultation with pulmonary medicine.  Patient will be advised on test results accordingly.  May come consider CTA of the chest for further evaluation if pulmonary function test reveal abnormalities.  - General PFT Lab (Please always keep checked); Future  - Pulmonary Function Test; Future  - Adult Pulmonary Medicine Referral; Future    Muscle weakness (generalized)  Patient reports proximal thigh muscle weakness with walking stairs.  He has not been seen in clinic, and video visit does not allow for strength testing.  Patient was advised to be seen by his primary care provider for neurologic exam.  Referral to neurology was also given connect exclude possible myopathy or neuropathy either related to COVID-19 illness or other causes.  Will proceed with evaluation for metabolic and endocrine causes.  Patient will schedule neurology evaluation at his convenience.  - Adult Neurology  Referral; Future  - TSH with free T4 reflex; Future  - Hgb A1c; Future  - Comprehensive metabolic panel; Future  - CBC with platelets; Future    Post-COVID chronic fatigue  Reviewed fatigue following viral illness.   Recommend physical therapy as well as occupational therapy.  Patient also reports significant anxiety which has improved with counseling and current dose of escitalopram.  He was encouraged to return to physical activity as able and discussed pacing and other energy conservation strategies with occupational therapy.  - Physical Therapy Referral; Future  - Occupational Therapy Referral; Future      I spent a total of 51 minutes on the day of the visit.   Time spent doing chart review, history and exam, documentation and further activities per the note           Return in about 3 months (around 9/8/2022).    Clarisa Melara MD  [facility] PHYSICAL MEDICINE AND REHABILITATION CLINIC MARYAN Grace is a 56 year old who presents for the following health issues   No chief complaint on file.      HPI         History of COVID-19 infection: Patient reports that he had COVID in September of 2021. He states that had sore throat, cough. He was having chills and had a lot of fatigue. He had taste and smell distortion that led to weight loss. He took off 3 weeks at work. He was seen in the ER with decreased oxygen saturation, however, he was not admitted to the hospital. He was not treated with monoclonal antibodies. He returned to work half-time, states that he was still getting short of breath with walking. He started taking calls after 4 weeks (beginning of October). He reports that he had a lot of fatigue. 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 He states that in November he has started to notice issues with breathing. He reports that he went on a longer walk with friends and has noticed significant fatigue and dyspnea. He states that in January he started to notice more dyspnea with walking up the stairs. He also has noticed increase in anxiety related to going to work. He attributes the anxiety in regards to both being in a crowded  "place and a sense of \"hallway being too long\". He has started Lexapro recently that has helped with anxiety. He also reports ongoing fatigue. He states that has not been sleeping enough. He has sleep apnea and has not been able to use CPAP due to recall of the devices.     Current concerns: Health Concerns    PHQ Assesment Total Score(s) 6/6/2022   PHQ-2 Score 0   Some recent data might be hidden     VIDA-7 Results 6/6/2022   VIDA 7 TOTAL SCORE 9 (mild anxiety)   Some recent data might be hidden     PTSD Screen Score 6/6/2022   Have you ever experienced this kind of event? Yes   PTSD Screen (Score of 3 or more suggests positive screen) 3   Some recent data might be hidden     No flowsheet data found.          Past Medical History:   Diagnosis Date     Borderline hyperlipidemia      Hernia, abdominal     inguinal     Palpitations     intermittent PSVT       Past Surgical History:   Procedure Laterality Date     HC REPAIR ING HERNIA,5+Y/O,REDUCIBL      right inguinal     VASECTOMY       ZZC APPENDECTOMY         Family History   Problem Relation Age of Onset     Diabetes Paternal Aunt         type 1, childhood onset     C.A.D. Paternal Aunt      Cerebrovascular Disease Paternal Grandmother      Diabetes Paternal Grandmother         type 2     Cancer Father 68        bladder ca/lentigo maligna     C.A.D. Father 65        coronary stents     Cancer - colorectal No family hx of      Prostate Cancer No family hx of        Social History     Tobacco Use     Smoking status: Never Smoker     Smokeless tobacco: Never Used   Substance Use Topics     Alcohol use: Yes     Alcohol/week: 0.0 standard drinks     Comment: 2 x week     Drug use: No         Current Outpatient Medications:      escitalopram (LEXAPRO) 10 MG tablet, Take 1 tablet (10 mg) by mouth daily, Disp: 30 tablet, Rfl: 2     multivitamin, therapeutic with minerals (THERA-VIT-M) TABS, Take 1 tablet by mouth daily, Disp: , Rfl:      pseudoePHEDrine (SUDAFED) 120 MG 12 " hr tablet, Take 1 tablet (120 mg) by mouth every 12 hours, Disp: 60 tablet, Rfl: 5     sildenafil (REVATIO) 20 MG tablet, Take 1 tablet (20 mg) by mouth daily as needed (erectile dysfunction), Disp: 30 tablet, Rfl: 3    Review of Systems   Constitutional: Positive for fatigue. Negative for chills and fever.   HENT: Negative for ear discharge, ear pain, hearing loss, mouth sores, nosebleeds, sinus pain, sore throat, tinnitus and trouble swallowing.    Respiratory: Positive for shortness of breath. Negative for cough and wheezing.    Cardiovascular: Negative for chest pain and palpitations.   Endocrine: Negative for polydipsia and polyphagia.   Musculoskeletal: Positive for arthralgias and back pain. Negative for joint swelling, myalgias and neck pain.   Neurological: Negative for light-headedness.   Psychiatric/Behavioral: Positive for decreased concentration. Negative for hallucinations. The patient is nervous/anxious.             Objective           Vitals:  No vitals were obtained today due to virtual visit.    Physical Exam   GENERAL: Healthy, alert and no distress  EYES: Eyes grossly normal to inspection.  No discharge or erythema, or obvious scleral/conjunctival abnormalities.  RESP: No audible wheeze, cough, or visible cyanosis.  No visible retractions or increased work of breathing.    SKIN: Visible skin clear. No significant rash, abnormal pigmentation or lesions.  NEURO: Cranial nerves grossly intact.  Mentation and speech appropriate for age.  PSYCH: Mentation appears normal, affect normal/bright, judgement and insight intact, normal speech and appearance well-groomed.                Video-Visit Details    Type of service:  Video Visit    Video End Time:10:10 AM    Originating Location (pt. Location): Home    Distant Location (provider location):  [facility] PHYSICAL MEDICINE AND REHABILITATION CLINIC [place]     Platform used for Video Visit: [employer]

## 2022-06-08 NOTE — LETTER
6/8/2022       RE: Bharath Mercado  [address] [facility] [address]     Dear Colleague,    Thank you for referring your patient, Bharath Mercado, to the [facility] PHYSICAL MEDICINE AND REHABILITATION CLINIC [place] [facility]. Please see a copy of my visit note below.    Video Start Time: 9:34 AM    Assessment & Plan     Post-COVID-19 condition  Discussed resolution of symptoms following COVID-19 with patient.  Advised on rehab based approach.  - Adult Post Covid Clinic Referral    Post-COVID chronic dyspnea  Reviewed possible causes of dyspnea following COVID-19, including residual parenchymal lung disease.  Recommend pulmonary function testing as well as consultation with pulmonary medicine.  Patient will be advised on test results accordingly.  May come consider CTA of the chest for further evaluation if pulmonary function test reveal abnormalities.  - General PFT Lab (Please always keep checked); Future  - Pulmonary Function Test; Future  - Adult Pulmonary Medicine Referral; Future    Muscle weakness (generalized)  Patient reports proximal thigh muscle weakness with walking stairs.  He has not been seen in clinic, and video visit does not allow for strength testing.  Patient was advised to be seen by his primary care provider for neurologic exam.  Referral to neurology was also given connect exclude possible myopathy or neuropathy either related to COVID-19 illness or other causes.  Will proceed with evaluation for metabolic and endocrine causes.  Patient will schedule neurology evaluation at his convenience.  - Adult Neurology  Referral; Future  - TSH with free T4 reflex; Future  - Hgb A1c; Future  - Comprehensive metabolic panel; Future  - CBC with platelets; Future    Post-COVID chronic fatigue  Reviewed fatigue following viral illness.  Recommend physical therapy as well as occupational therapy.  Patient also reports  "significant anxiety which has improved with counseling and current dose of escitalopram.  He was encouraged to return to physical activity as able and discussed pacing and other energy conservation strategies with occupational therapy.  - Physical Therapy Referral; Future  - Occupational Therapy Referral; Future      I spent a total of 51 minutes on the day of the visit.   Time spent doing chart review, history and exam, documentation and further activities per the note           Return in about 3 months (around 9/8/2022).    Clarisa Melara MD  [facility] PHYSICAL MEDICINE AND REHABILITATION CLINIC MARYAN Grace is a 56 year old who presents for the following health issues   No chief complaint on file.      HPI     History of COVID-19 infection: Patient reports that he had COVID in September of 2021. He states that had sore throat, cough. He was having chills and had a lot of fatigue. He had taste and smell distortion that led to weight loss. He took off 3 weeks at work. He was seen in the ER with decreased oxygen saturation, however, he was not admitted to the hospital. He was not treated with monoclonal antibodies. He returned to work half-time, states that he was still getting short of breath with walking. He started taking calls after 4 weeks (beginning of October). He reports that he had a lot of fatigue. 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 He states that in November he has started to notice issues with breathing. He reports that he went on a longer walk with friends and has noticed significant fatigue and dyspnea. He states that in January he started to notice more dyspnea with walking up the stairs. He also has noticed increase in anxiety related to going to work. He attributes the anxiety in regards to both being in a crowded place and a sense of \"hallway being too long\". He has started Lexapro recently that has " helped with anxiety. He also reports ongoing fatigue. He states that has not been sleeping enough. He has sleep apnea and has not been able to use CPAP due to recall of the devices.     Current concerns: Health Concerns    PHQ Assesment Total Score(s) 6/6/2022   PHQ-2 Score 0   Some recent data might be hidden     VIDA-7 Results 6/6/2022   VIDA 7 TOTAL SCORE 9 (mild anxiety)   Some recent data might be hidden     PTSD Screen Score 6/6/2022   Have you ever experienced this kind of event? Yes   PTSD Screen (Score of 3 or more suggests positive screen) 3   Some recent data might be hidden     No flowsheet data found.          Past Medical History:   Diagnosis Date     Borderline hyperlipidemia      Hernia, abdominal     inguinal     Palpitations     intermittent PSVT       Past Surgical History:   Procedure Laterality Date     HC REPAIR ING HERNIA,5+Y/O,REDUCIBL      right inguinal     VASECTOMY       ZZC APPENDECTOMY         Family History   Problem Relation Age of Onset     Diabetes Paternal Aunt         type 1, childhood onset     C.A.D. Paternal Aunt      Cerebrovascular Disease Paternal Grandmother      Diabetes Paternal Grandmother         type 2     Cancer Father 68        bladder ca/lentigo maligna     C.A.D. Father 65        coronary stents     Cancer - colorectal No family hx of      Prostate Cancer No family hx of        Social History     Tobacco Use     Smoking status: Never Smoker     Smokeless tobacco: Never Used   Substance Use Topics     Alcohol use: Yes     Alcohol/week: 0.0 standard drinks     Comment: 2 x week     Drug use: No         Current Outpatient Medications:      escitalopram (LEXAPRO) 10 MG tablet, Take 1 tablet (10 mg) by mouth daily, Disp: 30 tablet, Rfl: 2     multivitamin, therapeutic with minerals (THERA-VIT-M) TABS, Take 1 tablet by mouth daily, Disp: , Rfl:      pseudoePHEDrine (SUDAFED) 120 MG 12 hr tablet, Take 1 tablet (120 mg) by mouth every 12 hours, Disp: 60 tablet, Rfl: 5      sildenafil (REVATIO) 20 MG tablet, Take 1 tablet (20 mg) by mouth daily as needed (erectile dysfunction), Disp: 30 tablet, Rfl: 3    Review of Systems   Constitutional: Positive for fatigue. Negative for chills and fever.   HENT: Negative for ear discharge, ear pain, hearing loss, mouth sores, nosebleeds, sinus pain, sore throat, tinnitus and trouble swallowing.    Respiratory: Positive for shortness of breath. Negative for cough and wheezing.    Cardiovascular: Negative for chest pain and palpitations.   Endocrine: Negative for polydipsia and polyphagia.   Musculoskeletal: Positive for arthralgias and back pain. Negative for joint swelling, myalgias and neck pain.   Neurological: Negative for light-headedness.   Psychiatric/Behavioral: Positive for decreased concentration. Negative for hallucinations. The patient is nervous/anxious.         Objective      Vitals:  No vitals were obtained today due to virtual visit.    Physical Exam   GENERAL: Healthy, alert and no distress  EYES: Eyes grossly normal to inspection.  No discharge or erythema, or obvious scleral/conjunctival abnormalities.  RESP: No audible wheeze, cough, or visible cyanosis.  No visible retractions or increased work of breathing.    SKIN: Visible skin clear. No significant rash, abnormal pigmentation or lesions.  NEURO: Cranial nerves grossly intact.  Mentation and speech appropriate for age.  PSYCH: Mentation appears normal, affect normal/bright, judgement and insight intact, normal speech and appearance well-groomed.      Sincerely,    Clarisa Melara MD

## 2022-06-09 ENCOUNTER — TELEPHONE (OUTPATIENT)
Dept: PULMONOLOGY | Facility: CLINIC | Age: 57
End: 2022-06-09
Payer: COMMERCIAL

## 2022-06-15 ENCOUNTER — DOCUMENTATION ONLY (OUTPATIENT)
Dept: PHYSICAL MEDICINE AND REHAB | Facility: CLINIC | Age: 57
End: 2022-06-15
Payer: COMMERCIAL

## 2022-06-15 NOTE — PROGRESS NOTES
Initial Visit Date: 6/8/22   Provider:  Saritha   Followup Requested STATUS DETAILS   Original Provider     LAB/Imaging     Refferals STATUS    OT Scheduled 7/1/22    PT Scheduled 7/5/22    Pulmonary Medicine Scheduled 9/7/22    Neurology Scheduled 11/2/22                Follow-up with Dr. Melara scheduled 10/12/22.    Aguilar Gatica

## 2022-06-28 ENCOUNTER — TELEPHONE (OUTPATIENT)
Dept: NEUROLOGY | Facility: CLINIC | Age: 57
End: 2022-06-28

## 2022-07-01 ENCOUNTER — HOSPITAL ENCOUNTER (OUTPATIENT)
Dept: OCCUPATIONAL THERAPY | Facility: CLINIC | Age: 57
Setting detail: THERAPIES SERIES
Discharge: HOME | End: 2022-07-01
Attending: INTERNAL MEDICINE
Payer: OTHER MISCELLANEOUS

## 2022-07-01 DIAGNOSIS — U09.9 POST-COVID CHRONIC FATIGUE: ICD-10-CM

## 2022-07-01 DIAGNOSIS — G93.32 POST-COVID CHRONIC FATIGUE: ICD-10-CM

## 2022-07-01 PROCEDURE — 97165 OT EVAL LOW COMPLEX 30 MIN: CPT | Mod: GO

## 2022-07-01 NOTE — PROGRESS NOTES
"   07/01/22 0800   Quick Adds   Type of Visit Initial Outpatient Occupational Therapy Evaluation   General Information   Start Of Care Date 07/01/22   Referring Physician Clarisa Melara MD   Orders Evaluate and treat as indicated   Other Orders PT, Pulmonary Medicine   Orders Date 06/08/22   Medical Diagnosis Post-COVID chronic fatigue   Onset of Illness/Injury or Date of Surgery 06/08/22  (date of order used, COVID symptoms started 9/6/21 (Labor Day))   Precautions/Limitations No known precautions/limitations   Surgical/Medical History Reviewed Yes   Additional Occupational Profile Info/Pertinent History of Current Problem Bharath Mercado is a pleasant 56 year old male who presents to OP OT for further evaluation post-COVID. Per progress note from visit to COVID clinic:  History of COVID-19 infection: Patient reports that he had COVID in September of 2021. He states that had sore throat, cough. He was having chills and had a lot of fatigue. He had taste and smell distortion that led to weight loss. He took off 3 weeks at work. He was seen in the ER with decreased oxygen saturation, however, he was not admitted to the hospital. He was not treated with monoclonal antibodies. He returned to work half-time, states that he was still getting short of breath with walking. He started taking calls after 4 weeks (beginning of October). He reports that he had a lot of fatigue. 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 He states that in November he has started to notice issues with breathing. He reports that he went on a longer walk with friends and has noticed significant fatigue and dyspnea. He states that in January he started to notice more dyspnea with walking up the stairs. He also has noticed increase in anxiety related to going to work. He attributes the anxiety in regards to both being in a crowded place and a sense of \"hallway being too long\". " "He has started Lexapro recently that has helped with anxiety. He also reports ongoing fatigue. He states that has not been sleeping enough. He has sleep apnea and has not been able to use CPAP due to recall of the devices.  PMH includes borderline hyperlipidemia, EFE, lumbago, abdominal hernia, palpitations.   Comments/Observations Pt arrives in scrubs today and reports he will be working the rest of the day.   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 Tested positive on Labor Day, reports had \"bad cold\" symptoms and slept about 22 hours/day for those few weeks. Reports he went back to work a little too early. Overall has been busy the last week with moving from an apartment to a duplex. Notes he enjoys taking his dog for walks and was also an active hiker prior to COVID.   Current Living Environment House  (Duplex, lives on upper floor)   Number of Stairs to Enter Home 20   Number of Stairs Within Home 15   Primary Bathroom Location/Comments Main floor (upper floor of duplex)   Prior Level - Transfers Independent   Prior Level - Ambulation Independent   Prior Level - ADLS Independent   Prior Responsibilities - IADL Meal Preparation;Housekeeping;Laundry;Shopping;Yardwork;Medication management;Finances;Driving;Work   Prior Level Comments IND with ADLs/IADLs and work   Role/Living Environment Comments Lives in duplex with his wife and his daughter. Reports stairs to his duplex in the back of the building are \"daunting.\" Reports SOB with completing stairs. He lives on the upper floor and spouse lives on lower floor. Reports he has a dog and wife has a dog and they don't get along.   Patient/family Goals Statement \"Post-COVID recovery\"   Pain   Patient currently in pain No " "  Pain comments Reports osteoarthritis and has regular pain/discomfort, including bad hip and foot, but he feels well today. Reports this wakes him frequently, particularly the internal ache in his hip.   Fall Risk Screen   Fall screen completed by OT   Have you fallen 2 or more times in the past year? No   Have you fallen and had an injury in the past year? No   Is patient a fall risk? No   Abuse Screen (yes response referral indicated)   Feels Unsafe at Home or Work/School no   Feels Threatened by Someone no   Does Anyone Try to Keep You From Having Contact with Others or Doing Things Outside Your Home? no   Physical Signs of Abuse Present no   Cognitive Status Examination   Orientation Orientation to person, place and time   Level of Consciousness Alert   Follows Commands and Answers Questions 100% of the time;Able to follow single-step instructions   Cognitive Comment Reports that when he went back to work, he had difficulty recalling how exactly to do the first surgery he did, which was concerning. Reports he has not had this issue with other surgeries and overall feels this has improved. Reports organization/sequencing has been a little more challenging, but overall feels this too has improved with new anxiety medication.  Reports the decision-making leading up to surgery has been challenging but feels this is more due to having cases that are not typical for their hospital, and the hospital has been quite busy. Endorses tries to give himself \"brain breaks\" during the day and describes this as \"running away\" such needing to step out to someplace quiet when a room is quiet. Reports that much of his life, never needed compensatory strategies with memory, but notes he is using these more frequently now due to a combination of factors like aging, anxiety, moving/stress.   Visual Perception   Visual Perception Wears glasses  (bifocal)   Visual Perception Comments Feels probably needs updated prescription for " "reading distance but does not feel this is related to COVID.   Strength   Strength Comments Feels both legs are weak in lateral portion of thighs. Denies changes in UE strength.   Functional Mobility   Ambulation Mod I, increased time   Functional Mobility Comments Reports he has a \"weird way of doing stairs\" due to weakness noted in legs and ache in hip. To be further assessed by PT (has future evaluation scheduled).   Transfer Skill   Level of [place]: Transfers other (see comments)  (Mod I, increased time)   Bathing   Level of [place] - Bathing independent   Upper Body Dressing   Level of [place]: Dress Upper Body independent   Lower Body Dressing   Level of [place]: Dress Lower Body independent   Toileting   Level of [place]: Toilet independent   Grooming   Level of [place]: Grooming independent   Eating/Self-Feeding   Level of [place]: Eating independent   Activity Tolerance   Activity Tolerance Reports that he sleeps poorly. Has sleep apnea and CPAP but hasn't been able to use CPAP due to recall. Estimates he gets about 6 hours of sleep each night. Falls asleep quickly. 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 Does feel he has weakness in his legs and dyspnea with exertion that makes long distances challenging. Longest exercise since COVID was doing a bike ride plus a 5 mile hike in one day, and reports he was \"almost stumbling\" by the end of it (this was this spring, reports he had started walking again back in October so he had some activity training prior to this hike). Has future evaluation with PT and pulmonary function test scheduled.   Instrumental Activities of Daily Living Assessment   IADL Assessment/Observations Medication management: manages IND from the bottle. Reports he has forgotten " "medciation overnight but has never skipped a dose.   Meal Planning/Preparation Reports typically enjoys cooking at home but has been eating out more due to recent move. Declines difficulty following or remembering recipes.   Home/Financial Management Reports completes his own housekeeping and laundry. Reports in new house, he is on the second floor and the laundry is in the basement so doing stairs to complete laundry feels \"daunting.\" Denies trouble with managing finances/bills; does feels it's maybe not as automatic for him right now although needs to set up new autopay with his recent move.   Communication/Computer Use Uses computer, cell phone, pager for work and denies challenges.   Community Mobility Reports drives himself. Doesn't feel  this has been impacted by COVID but thinks his wife would say that navigation and switching lanes has been worse, but he hasn't noted these changes himself.   Clinical Impression   Criteria for Skilled Therapeutic Interventions Met Evaluation only;No problems identified which require skilled intervention   OT Diagnosis Decreased ease of completion of IADLs, work   Influenced by the following impairments activity tolerance, leg strength   Assessment of Occupational Performance 1-3 Performance Deficits   Identified Performance Deficits household management, leisure/exercise, work   Clinical Decision Making (Complexity) Low complexity   Therapy Frequency evaluation only   Risks and Benefits of Treatment have been explained. Yes   Patient, Family & other staff in agreement with plan of care Yes   Clinical Impression Comments Per evaluation and report from patient, no skilled OT needs identified at this time. Anticipate pt will benefit from further evaluation with PT and pulmonary function test, which are already scheduled to be completed in the near future. Pt is welcome to return to outpatient OT should other needs arise with new referral from his physician.   Education " Assessment   Barriers To Learning No Barriers   Preferred Learning Style Listening;Reading;Demonstration;Pictures/video   Total Evaluation Time   OT Eval, Low Complexity Minutes ([id number]) 40     Thank you for the referral of this patient.  If you have any questions regarding the information in this report, please feel free to contact me per the information provided below.      January Chaudhary MA, OTR/L  Occupational Therapist  [address] [address]  Clinic Fax:  [phone]  Clinic Phone: [phone]

## 2022-07-05 ENCOUNTER — HOSPITAL ENCOUNTER (OUTPATIENT)
Dept: PHYSICAL THERAPY | Facility: CLINIC | Age: 57
Setting detail: THERAPIES SERIES
Discharge: HOME | End: 2022-07-05
Attending: INTERNAL MEDICINE
Payer: OTHER MISCELLANEOUS

## 2022-07-05 DIAGNOSIS — U09.9 POST-COVID CHRONIC FATIGUE: ICD-10-CM

## 2022-07-05 DIAGNOSIS — G93.32 POST-COVID CHRONIC FATIGUE: ICD-10-CM

## 2022-07-05 PROCEDURE — 97161 PT EVAL LOW COMPLEX 20 MIN: CPT | Mod: GP | Performed by: PHYSICAL THERAPIST

## 2022-07-19 NOTE — TELEPHONE ENCOUNTER
RECORDS RECEIVED FROM: Internal   DATE RECEIVED: 9.7.22   NOTES STATUS DETAILS   OFFICE NOTE from referring provider Internal 6.8.22, 5.10.22, 9.21.21 [facility]   OFFICE NOTE from other specialist     DISCHARGE SUMMARY from hospital     DISCHARGE REPORT from the ER Internal 9.14.21 [facility] ED   MEDICATION LIST Internal    IMAGING  (NEED IMAGES AND REPORTS)     CT SCAN     CHEST XRAY (CXR) Internal 10.14.21  9.14.21   TESTS     PULMONARY FUNCTION TESTING (PFT)

## 2022-07-25 NOTE — PROGRESS NOTES
07/05/22 0700   Quick Adds   Type of Visit Initial OP PT Evaluation   General Information   Start of Care Date 07/05/22   Referring Physician Clarisa Melara MD   Orders Evaluate and Treat as Indicated   Additional Orders OT (eval only on 7/1/22); Adult Pulmonary Medicine Referral ; Adult Neurology  Referral; Adult Post Covid Clinic Referral   Order Date 06/08/22   Medical Diagnosis Post-COVID chronic fatigue (R53.82, U09.9)   Onset of illness/injury or Date of Surgery 09/06/21  (COVID symptoms started)   Precautions/Limitations no known precautions/limitations   Special Instructions COVID-19 Program   Surgical/Medical history reviewed Yes   Pertinent history of current problem (include personal factors and/or comorbidities that impact the POC) Sanjay is a 56 year old who presents for OP PT evaluation to address Post-COVID chronic fatigue referred by his PCP, Dr Melara on 6/8/22. PER CHART REVIEW: HISTORY OF PRESENT ILLNESS: History of COVID-19 infection: Patient reports that he had COVID in September of 2021. He states that had sore throat, cough. He was having chills and had a lot of fatigue. He had taste and smell distortion that led to weight loss. He took off 3 weeks at work. He was seen in the ER with decreased oxygen saturation, however, he was not admitted to the hospital. He was not treated with monoclonal antibodies. He returned to work half-time, states that he was still getting short of breath with walking. He started taking calls after 4 weeks (beginning of October). He reports that he had a lot of fatigue. 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 He states that in November he has started to notice issues with breathing. He reports that he went on a longer walk with friends and has noticed significant fatigue and dyspnea. He states that in January he started to notice more dyspnea with walking up the stairs. He also has noticed  "increase in anxiety related to going to work. He attributes the anxiety in regards to both being in a crowded place and a sense of \"hallway being too long\". He has started Lexapro recently that has helped with anxiety. He also reports ongoing fatigue. He states that has not been sleeping enough. He has sleep apnea and has not been able to use CPAP due to recall of the devices. MD RECOMMENDATIONS: 1) referred for Pulmonary function test to address post-COVID chronic dyspnea. 2) Referred to Neurology to address generalized ms weakness - pt reports proximal ms weakness walking on stairs. 3) Refered to both OT and PT to address post-COVID chronic fatigue.   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 PLOF: Taking dog for walks and active hiker prior to COVID. Walking 22-24 miles/day. Traveling. SXS SINCE DX WITH COVID: Reports overall he was feeling better by Nov,2021 but in January he started noticing respiratory fatigue just walking down the halls. CURRENT FUNCTION/SYMPTOM REPORT: WORK: working fulltime. LIVING ENVIRONMENT: Duplex, although he is in the process of moving - busy packing boxes. Will have an exercise room where he is moving to. STAIRS: Reports he has a \"weird way of doing stairs\" due to weakness noted in legs and ache in hip. Reports completes his own housekeeping and laundry - he is on the second floor and the laundry is in the basement so doing stairs to complete laundry feels \"daunting.\" Reports SOB with completing stairs. Lives on upper floor, 20 stairs to enter and 15 stairs in home. Lives in duplex with his wife and his daughter.  He lives on the upper floor and spouse lives on lower floor -Reports he has a dog and wife has a dog and they don't get along.  ACTIVITY TOLERANCE: Does feel he has weakness in his legs and dyspnea with exertion that makes long distances " "challenging. Longest exercise since COVID was doing a bike ride plus a 5 mile hike in one day, and reports he was \"almost stumbling\" by the end of it (this was this spring, reports he had started walking again back in October so he had some activity training prior to this hike). Now he takes shorter walks with dogs - will walk with sticks. SLEEP: Reports that he sleeps poorly. Has sleep apnea and CPAP but hasn't been able to use CPAP due to recall. Estimates he gets about 6 hours of sleep each night. PAIN: Reports osteoarthritis and has regular pain/discomfort, including bad hip and foot, but he feels well today. Reports this wakes him frequently, particularly the internal ache in his hip. He has had injections to hips and imaging but his pain cannot be explained by ortho. WEAKNESS: Feels both legs are weak in lateral portion of thighs making stairs difficult. MOOD: reports more anxiety recently. BALANCE: reports no LOB/falls.   Fall Risk Screen   Fall screen completed by PT   Have you fallen 2 or more times in the past year? No   Have you fallen and had an injury in the past year? No   Is patient a fall risk? No   Abuse Screen (yes response referral indicated)   Physical Signs of Abuse Present no   Vitals Signs   Heart Rate 59   SpO2 96   Blood Pressure 145/86   Vital Signs Comments in seated rest. Reports his heart doesnt race and his O2sats have been good. No BP meds.   Cognitive Status Examination   Orientation orientation to person, place and time   Level of Consciousness alert   Follows Commands and Answers Questions 100% of the time   Personal Safety and Judgment intact   Observation   Observation FACIT: 35/52 (higher the score the better QOL). 30 SECOND SIT TO STAND: x21 reps, repeated x30 reps (no age/gender matched norms, 60-63 yo M = 17 rep avg), inc RR. HR 92 bpm,  O2: 98%. Fatigue: baseline 1/10 to 7/10 following. 6MWT: (20' path, multiple turns) 384 meters (638-689 m avg for age/gender) no AD, walking " and talking, reports glute fatigue after 2 minutes, maintains reciporcal gait, good pace, balance, vital monitoring throughout, fatigue followin/10, HR 88 bpm, O2sat 96. STAIRS: mild SB and wide SUZANNE, reciprocal gait up/downstiars, reports when he is more fatigued his legs 'toe in' but he is trying not to do this lately.   Planned Therapy Interventions   Planned Therapy Interventions neuromuscular re-education;stretching;strengthening;gait training;other (see comments)   Planned Therapy Interventions Comment post-covid protocol   Clinical Impression   Criteria for Skilled Therapeutic Interventions Met yes, treatment indicated   PT Diagnosis decreased activity tolerance in the context of post-covid fatigue   Influenced by the following impairments decreased endurance per 6MWT, functional LE weakness negotiating stairs   Functional limitations due to impairments participation in prior exercise (ie hiking, walking 22-24 miles/day, taking dog for longer walks), negotiating stairs brad fatiguing when carrying laundry,   Clinical Presentation Stable/Uncomplicated   Clinical Decision Making (Complexity) Low complexity   Therapy Frequency 1 time/week  (decreased to 1x/week every 2 weeks prn; 6 visits)   Predicted Duration of Therapy Intervention (days/wks) 8 weeks  (through 22)   Risk & Benefits of therapy have been explained Yes   Patient, Family & other staff in agreement with plan of care Yes   GOALS   PT Eval Goals 1;2;3;4   Goal 1   Goal Identifier FACIT:   Goal Description Pt to score >/= 43/52 on FACIT reporting no more than 'somewhat' fatigued across all measures, reflecting improved QOL less effected by fatigue   Goal Progress at eval 22: 35/52 (higher the score the better QOL), on 3 measures reporting 'quite a bit' limited by fatigue.   Target Date 22   Goal 2   Goal Identifier 6MWT:   Goal Description Pt to complete >/= 638 m during 6MWT to demonstrate significant improvement in walking endurance  (50 m = MDC) and be considered WNL for age/gender matched norms, appropriate for returning to hiking and longer walks with his dog.   Goal Progress at [facility] 22: (20' path, multiple turns) 384 meters (638-689 m avg for age/gender) no AD, reports glute fatigue after 2 minutes, maintains reciporcal gait, good pace, balance, fatigue followin/10, HR 88 bpm, O2sat 96. PLOF: hiking, walking 22-24 miles in a day. Taking dog for longer walks.   Target Date 22   Goal 3   Goal Identifier STAIRS:   Goal Description Pt to negotiate stairs with and without carrying laundry with appropraite body mechanics, stability and fatigue in order to improve negotiating stairs in home with greater ease   Goal Progress at [facility] 22: mild SB and wide SUZANNE, reciprocal gait up/downstiars, reports when he is more fatigued his legs 'toe in' but he is trying not to do this lately. Finds carrying laundry un/down stairs 'daunting'   Target Date 22   Goal 4   Goal Identifier HEP   Goal Description Pt to report consistancy and demonstrate appropraite response to progressing strength and aerobic exercise HEP for progress towards all goals and continued self maintanance following d/c from therapy   Goal Progress at [facility] 22: short walks with the dog   Target Date 22   Total Evaluation Time   PT Fernando, Low Complexity Minutes ([id number]) 40

## 2022-07-26 ENCOUNTER — OFFICE VISIT (OUTPATIENT)
Dept: NEUROLOGY | Facility: CLINIC | Age: 57
End: 2022-07-26
Attending: INTERNAL MEDICINE
Payer: COMMERCIAL

## 2022-07-26 VITALS
DIASTOLIC BLOOD PRESSURE: 78 MMHG | SYSTOLIC BLOOD PRESSURE: 130 MMHG | HEART RATE: 63 BPM | WEIGHT: 206.2 LBS | BODY MASS INDEX: 27.93 KG/M2 | OXYGEN SATURATION: 97 % | HEIGHT: 72 IN

## 2022-07-26 DIAGNOSIS — M62.81 PROXIMAL LIMB MUSCLE WEAKNESS: Primary | ICD-10-CM

## 2022-07-26 PROCEDURE — 99204 OFFICE O/P NEW MOD 45 MIN: CPT

## 2022-07-26 NOTE — NURSING NOTE
Bharath Mercado is a 56 year old male who presents for:  Chief Complaint   Patient presents with     NEUROPATHY     Muscle Weakness        Initial Vitals:  /78   Pulse 63   Ht 1.829 m (6')   Wt 93.5 kg (206 lb 3.2 oz)   SpO2 97%   BMI 27.97 kg/m   Estimated body mass index is 27.97 kg/m  as calculated from the following:    Height as of this encounter: 1.829 m (6').    Weight as of this encounter: 93.5 kg (206 lb 3.2 oz).. Body surface area is 2.18 meters squared. BP completed using cuff size: regular    Visit Facilitator    Jane Mcrae

## 2022-07-26 NOTE — PROGRESS NOTES
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 Patient states that he had a bad case of COVID-19 last fall, but was not hospitalized.  Due to his condition at that time he was in bed for most of the month of November 2021 and felt weak, tired, and short of breath.  Since then, his COVID symptoms have cleared, but his muscle weakness has gotten progressively worse along with occasional dyspnea.  Patient says that walking on flat surfaces is ok, but walking uphill or up stairs is difficult for him. Denies falls. Denies noticing foot drop. Denies sensory disturbances or sharp, shooting pains.  Lost 20 pounds when had COVID.  He noticed possible mild muscle mass loss in his right foot and b/l lateral thighs.  Weakness is equal on both sides.  Patient feels sore in thighs and tires easily, especially toward the end of the day.     Suffers from chronic low back pain and never had lumbar spine imaging done.  Never had an EMG. No shooting pains down legs from back pain.  Has PT eval within the next few weeks.       Past Medical History:     Patient Active Problem List   Diagnosis     Esophageal reflux     CARDIOVASCULAR SCREENING; LDL GOAL LESS THAN 160     Lumbago     Nonallopathic lesion of lumbar region     EFE (obstructive sleep apnea)     Past Medical History:   Diagnosis Date     Borderline hyperlipidemia      Hernia, abdominal     inguinal     Palpitations     intermittent PSVT        Past Surgical History:     Past Surgical History:   Procedure Laterality Date     HC REPAIR ING HERNIA,5+Y/O,REDUCIBL      right  inguinal     VASECTOMY       ZZC APPENDECTOMY          Social History:     Social History     Tobacco Use     Smoking status: Never Smoker     Smokeless tobacco: Never Used   Substance Use Topics     Alcohol use: Yes     Alcohol/week: 0.0 standard drinks     Comment: 2 x week     Drug use: No   Hip pain  Big toe OA       Family History:     Family History   Problem Relation Age of Onset     Diabetes Paternal Aunt         type 1, childhood onset     C.A.D. Paternal Aunt      Cerebrovascular Disease Paternal Grandmother      Diabetes Paternal Grandmother         type 2     Cancer Father 68        bladder ca/lentigo maligna     C.A.D. Father 65        coronary stents     Cancer - colorectal No family hx of      Prostate Cancer No family hx of         Medications:     Current Outpatient Medications   Medication Sig     escitalopram (LEXAPRO) 10 MG tablet TAKE 1 TABLET (10 MG) BY MOUTH DAILY.     multivitamin, therapeutic with minerals (THERA-VIT-M) TABS Take 1 tablet by mouth daily     pseudoePHEDrine (SUDAFED) 120 MG 12 hr tablet Take 1 tablet (120 mg) by mouth every 12 hours     sildenafil (REVATIO) 20 MG tablet Take 1 tablet (20 mg) by mouth daily as needed (erectile dysfunction)     No current facility-administered medications for this visit.        Allergies:     Allergies   Allergen Reactions     No Known Allergies         Review of Systems:   As noted above     Physical Exam:   Vitals: /78   Pulse 63   Ht 1.829 m (6')   Wt 93.5 kg (206 lb 3.2 oz)   SpO2 97%   BMI 27.97 kg/m     CV: peripheral pulse appreciated  Lungs: breathing comfortably  Extremities: no edema  Skin: No rashes noted    Neuro:   General Appearance: No apparent distress, well-nourished, well-groomed, pleasant     Mental Status: Alert and oriented to person, place, and time. Speech fluent and comprehension intact. No dysarthria. Normal memory, fund of knowledge, attention/concentration, and language    Cranial Nerves:   II: Visual  fields: normal  III: Pupils: 3 mm, equal, round, reactive to light   III,IV,VI: Extraocular Movements: intact   V: Facial sensation: intact to light touch b/l  VII: Facial strength: intact without asymmetry bilaterally  VIII: Hearing: intact grossly  IX: Palate: intact   XI: Shoulder shrug: intact bilaterally  XII: Tongue movement: normal bilaterally     Motor Exam:   Upper Extremities  Deltoid  Bicep  Tricep  Wrist Extensors  strength Intrinsic Muscles    Right  5  5  5  5 5 5    Left  5  5  5  5 5 5      Lower Extremities  Hip Flexors  Knee Extensors  Knee   Flexors  Dorsi Flexion  Plantar   Flexion    Right  4+ 5  5  5  5    Left  4+ 5  5  5  5        No drift is present. No abnormal movements. Mild atrophy appreciated in lateral proximal muscles as well as R medial foot.  No fasciculations.    Sensory: intact to light touch, vibration, and pinprick throughout upper and lower extremities bilaterally    Coordination: no dysmetria with finger-to-nose bilaterally    Reflexes: biceps, triceps, brachioradialis, patellar, and ankle jerks 2+ and symmetric. Toes are downgoing bilaterally    Gait: normal casual gait, normal stride length           Assessment and Plan:   Assessment:  This is a pleasant 56-year-old male presenting to the neurology clinic today with bilateral proximal lower extremity muscle weakness.  Patient complains of experiencing muscle weakness since severe COVID infection last fall that has not resolved and is worsening. Although this patient was bedridden for many weeks, and his symptoms could possibly be due to muscle misuse or nonuse during his COVID infection last November, it is concerning that his symptoms have not improved since that time, and that the patient reported that he may even be worsening.  Based on the subjective and objective findings during this visit today, possible etiologies of this patient's symptoms include COVID associated (versus other etiology) myopathy or myositis.   Myasthenia is also included in the differential given reported fatigable weakness.  Motor neuron disease is unlikely, but not totally excluded. We will plan to do an EMG and obtain additional laboratory work-up to further clarify his diagnosis.  Meanwhile, physical therapy would be helpful.    Plan:  -EMG and nerve conduction studies  -Aldolase, CK, and acetylcholine binding antibodies  -Encouraged patient to attend and participate in future PT appointments.  Patient reports that he has a PT evaluation scheduled already.  -Follow-up appointment in 4 to 6 weeks       KM Rivas D.O.  Resident Physician of Neurology  [facility].

## 2022-07-26 NOTE — LETTER
7/26/2022         RE: Bharath Mercado  [address] [facility] [place] [address]        Dear Colleague,    Thank you for referring your patient, Bharath Mercado, to the [facility]. Please see a copy of my visit note below.    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 Patient states that he had a bad case of COVID-19 last fall, but was not hospitalized.  Due to his condition at that time he was in bed for most of the month of November 2021 and felt weak, tired, and short of breath.  Since then, his COVID symptoms have cleared, but his muscle weakness has gotten progressively worse along with occasional dyspnea.  Patient says that walking on flat surfaces is ok, but walking uphill or up stairs is difficult for him. Denies falls. Denies noticing foot drop. Denies sensory disturbances or sharp, shooting pains.  Lost 20 pounds when had COVID.  He noticed possible mild muscle mass loss in his right foot and b/l lateral thighs.  Weakness is equal on both sides.  Patient feels sore in thighs and tires easily, especially toward the end of the day.     Suffers from chronic low back pain and never had lumbar spine imaging done.  Never had an EMG. No shooting pains down legs from back pain.  Has PT eval within the next few weeks.       Past Medical History:     Patient Active Problem List   Diagnosis     Esophageal reflux     CARDIOVASCULAR SCREENING; LDL GOAL LESS THAN 160     Lumbago     Nonallopathic lesion of lumbar region     EFE (obstructive sleep apnea)     Past Medical  History:   Diagnosis Date     Borderline hyperlipidemia      Hernia, abdominal     inguinal     Palpitations     intermittent PSVT        Past Surgical History:     Past Surgical History:   Procedure Laterality Date     HC REPAIR ING HERNIA,5+Y/O,REDUCIBL      right inguinal     VASECTOMY       ZZC APPENDECTOMY          Social History:     Social History     Tobacco Use     Smoking status: Never Smoker     Smokeless tobacco: Never Used   Substance Use Topics     Alcohol use: Yes     Alcohol/week: 0.0 standard drinks     Comment: 2 x week     Drug use: No   Hip pain  Big toe OA       Family History:     Family History   Problem Relation Age of Onset     Diabetes Paternal Aunt         type 1, childhood onset     C.A.D. Paternal Aunt      Cerebrovascular Disease Paternal Grandmother      Diabetes Paternal Grandmother         type 2     Cancer Father 68        bladder ca/lentigo maligna     C.A.D. Father 65        coronary stents     Cancer - colorectal No family hx of      Prostate Cancer No family hx of         Medications:     Current Outpatient Medications   Medication Sig     escitalopram (LEXAPRO) 10 MG tablet TAKE 1 TABLET (10 MG) BY MOUTH DAILY.     multivitamin, therapeutic with minerals (THERA-VIT-M) TABS Take 1 tablet by mouth daily     pseudoePHEDrine (SUDAFED) 120 MG 12 hr tablet Take 1 tablet (120 mg) by mouth every 12 hours     sildenafil (REVATIO) 20 MG tablet Take 1 tablet (20 mg) by mouth daily as needed (erectile dysfunction)     No current facility-administered medications for this visit.        Allergies:     Allergies   Allergen Reactions     No Known Allergies         Review of Systems:   As noted above     Physical Exam:   Vitals: /78   Pulse 63   Ht 1.829 m (6')   Wt 93.5 kg (206 lb 3.2 oz)   SpO2 97%   BMI 27.97 kg/m     CV: peripheral pulse appreciated  Lungs: breathing comfortably  Extremities: no edema  Skin: No rashes noted    Neuro:   General Appearance: No apparent distress,  well-nourished, well-groomed, pleasant     Mental Status: Alert and oriented to person, place, and time. Speech fluent and comprehension intact. No dysarthria. Normal memory, fund of knowledge, attention/concentration, and language    Cranial Nerves:   II: Visual fields: normal  III: Pupils: 3 mm, equal, round, reactive to light   III,IV,VI: Extraocular Movements: intact   V: Facial sensation: intact to light touch b/l  VII: Facial strength: intact without asymmetry bilaterally  VIII: Hearing: intact grossly  IX: Palate: intact   XI: Shoulder shrug: intact bilaterally  XII: Tongue movement: normal bilaterally     Motor Exam:   Upper Extremities  Deltoid  Bicep  Tricep  Wrist Extensors  strength Intrinsic Muscles    Right  5  5  5  5 5 5    Left  5  5  5  5 5 5      Lower Extremities  Hip Flexors  Knee Extensors  Knee   Flexors  Dorsi Flexion  Plantar   Flexion    Right  4+ 5  5  5  5    Left  4+ 5  5  5  5        No drift is present. No abnormal movements. Mild atrophy appreciated in lateral proximal muscles as well as R medial foot.  No fasciculations.    Sensory: intact to light touch, vibration, and pinprick throughout upper and lower extremities bilaterally    Coordination: no dysmetria with finger-to-nose bilaterally    Reflexes: biceps, triceps, brachioradialis, patellar, and ankle jerks 2+ and symmetric. Toes are downgoing bilaterally    Gait: normal casual gait, normal stride length           Assessment and Plan:   Assessment:  This is a pleasant 56-year-old male presenting to the neurology clinic today with bilateral proximal lower extremity muscle weakness.  Patient complains of experiencing muscle weakness since severe COVID infection last fall that has not resolved and is worsening. Although this patient was bedridden for many weeks, and his symptoms could possibly be due to muscle misuse or nonuse during his COVID infection last November, it is concerning that his symptoms have not improved since  that time, and that the patient reported that he may even be worsening.  Based on the subjective and objective findings during this visit today, possible etiologies of this patient's symptoms include COVID associated (versus other etiology) myopathy or myositis.  Myasthenia is also included in the differential given reported fatigable weakness.  Motor neuron disease is unlikely, but not totally excluded. We will plan to do an EMG and obtain additional laboratory work-up to further clarify his diagnosis.  Meanwhile, physical therapy would be helpful.    Plan:  -EMG and nerve conduction studies  -Aldolase, CK, and acetylcholine binding antibodies  -Encouraged patient to attend and participate in future PT appointments.  Patient reports that he has a PT evaluation scheduled already.  -Follow-up appointment in 4 to 6 weeks       KM Rivas D.O.  Resident Physician of Neurology  [facility].    Attestation with edits by Montez Bettencourt MD at 7/26/2022  8:11 PM:  Attending physician attestation.  I have personally reviewed/edited documentation and confirmed pertinent history/physical examination as documented. I agree with assessment and plan.  Montez Bettencourt MD.      Again, thank you for allowing me to participate in the care of your patient.        Sincerely,        Navjot Rivas MD

## 2022-07-27 ENCOUNTER — HOSPITAL ENCOUNTER (OUTPATIENT)
Dept: PHYSICAL THERAPY | Facility: CLINIC | Age: 57
Setting detail: THERAPIES SERIES
Discharge: HOME | End: 2022-07-27
Attending: INTERNAL MEDICINE
Payer: OTHER MISCELLANEOUS

## 2022-07-27 PROCEDURE — 97110 THERAPEUTIC EXERCISES: CPT | Mod: GP | Performed by: PHYSICAL THERAPIST

## 2022-07-27 PROCEDURE — 97535 SELF CARE MNGMENT TRAINING: CPT | Mod: GP | Performed by: PHYSICAL THERAPIST

## 2022-08-01 ENCOUNTER — LAB (OUTPATIENT)
Dept: LAB | Facility: CLINIC | Age: 57
End: 2022-08-01
Payer: OTHER MISCELLANEOUS

## 2022-08-01 DIAGNOSIS — M62.81 PROXIMAL LIMB MUSCLE WEAKNESS: ICD-10-CM

## 2022-08-01 DIAGNOSIS — M62.81 MUSCLE WEAKNESS (GENERALIZED): ICD-10-CM

## 2022-08-01 LAB
ALBUMIN SERPL BCG-MCNC: 4.4 G/DL (ref 3.5–5.2)
ALP SERPL-CCNC: 70 U/L (ref 40–129)
ALT SERPL W P-5'-P-CCNC: 18 U/L (ref 10–50)
ANION GAP SERPL CALCULATED.3IONS-SCNC: 6 MMOL/L (ref 7–15)
AST SERPL W P-5'-P-CCNC: 16 U/L (ref 10–50)
BILIRUB SERPL-MCNC: 0.3 MG/DL
BUN SERPL-MCNC: 11 MG/DL (ref 6–20)
CALCIUM SERPL-MCNC: 8.9 MG/DL (ref 8.6–10)
CHLORIDE SERPL-SCNC: 104 MMOL/L (ref 98–107)
CK SERPL-CCNC: 88 U/L (ref 39–308)
CREAT SERPL-MCNC: 1.02 MG/DL (ref 0.67–1.17)
DEPRECATED HCO3 PLAS-SCNC: 28 MMOL/L (ref 22–29)
ERYTHROCYTE [DISTWIDTH] IN BLOOD BY AUTOMATED COUNT: 14.2 % (ref 10–15)
GFR SERPL CREATININE-BSD FRML MDRD: 86 ML/MIN/1.73M2
GLUCOSE SERPL-MCNC: 87 MG/DL (ref 70–99)
HBA1C MFR BLD: 5.5 %
HCT VFR BLD AUTO: 49.8 % (ref 40–53)
HGB BLD-MCNC: 15.6 G/DL (ref 13.3–17.7)
MCH RBC QN AUTO: 28.6 PG (ref 26.5–33)
MCHC RBC AUTO-ENTMCNC: 31.3 G/DL (ref 31.5–36.5)
MCV RBC AUTO: 91 FL (ref 78–100)
PLATELET # BLD AUTO: 257 10E3/UL (ref 150–450)
POTASSIUM SERPL-SCNC: 4.7 MMOL/L (ref 3.4–5.3)
PROT SERPL-MCNC: 7.1 G/DL (ref 6.4–8.3)
RBC # BLD AUTO: 5.45 10E6/UL (ref 4.4–5.9)
SODIUM SERPL-SCNC: 138 MMOL/L (ref 136–145)
TSH SERPL DL<=0.005 MIU/L-ACNC: 1.54 UIU/ML (ref 0.3–4.2)
WBC # BLD AUTO: 5.8 10E3/UL (ref 4–11)

## 2022-08-01 PROCEDURE — 82550 ASSAY OF CK (CPK): CPT

## 2022-08-01 PROCEDURE — 84443 ASSAY THYROID STIM HORMONE: CPT

## 2022-08-01 PROCEDURE — 83519 RIA NONANTIBODY: CPT

## 2022-08-01 PROCEDURE — 36415 COLL VENOUS BLD VENIPUNCTURE: CPT

## 2022-08-01 PROCEDURE — 80053 COMPREHEN METABOLIC PANEL: CPT

## 2022-08-01 PROCEDURE — 82085 ASSAY OF ALDOLASE: CPT

## 2022-08-01 PROCEDURE — 83036 HEMOGLOBIN GLYCOSYLATED A1C: CPT

## 2022-08-01 PROCEDURE — 85027 COMPLETE CBC AUTOMATED: CPT

## 2022-08-04 LAB
ACHR BIND AB SER-SCNC: 0 NMOL/L
ALDOLASE SERPL-CCNC: 6.4 U/L

## 2022-08-10 ENCOUNTER — HOSPITAL ENCOUNTER (OUTPATIENT)
Dept: PHYSICAL THERAPY | Facility: CLINIC | Age: 57
Setting detail: THERAPIES SERIES
Discharge: HOME | End: 2022-08-10
Attending: INTERNAL MEDICINE
Payer: OTHER MISCELLANEOUS

## 2022-08-10 PROCEDURE — 97110 THERAPEUTIC EXERCISES: CPT | Mod: GP | Performed by: PHYSICAL THERAPIST

## 2022-08-17 ENCOUNTER — HOSPITAL ENCOUNTER (OUTPATIENT)
Dept: PHYSICAL THERAPY | Facility: CLINIC | Age: 57
Setting detail: THERAPIES SERIES
Discharge: HOME | End: 2022-08-17
Attending: INTERNAL MEDICINE
Payer: OTHER MISCELLANEOUS

## 2022-08-17 ENCOUNTER — OFFICE VISIT (OUTPATIENT)
Dept: PEDIATRICS | Facility: CLINIC | Age: 57
End: 2022-08-17
Payer: COMMERCIAL

## 2022-08-17 VITALS
HEART RATE: 75 BPM | SYSTOLIC BLOOD PRESSURE: 126 MMHG | DIASTOLIC BLOOD PRESSURE: 62 MMHG | WEIGHT: 205.2 LBS | TEMPERATURE: 97.1 F | OXYGEN SATURATION: 97 % | HEIGHT: 72 IN | BODY MASS INDEX: 27.79 KG/M2 | RESPIRATION RATE: 12 BRPM

## 2022-08-17 DIAGNOSIS — F41.9 ANXIETY: ICD-10-CM

## 2022-08-17 DIAGNOSIS — Z00.00 ROUTINE GENERAL MEDICAL EXAMINATION AT A HEALTH CARE FACILITY: Primary | ICD-10-CM

## 2022-08-17 DIAGNOSIS — Z12.11 SCREEN FOR COLON CANCER: ICD-10-CM

## 2022-08-17 PROCEDURE — 97110 THERAPEUTIC EXERCISES: CPT | Mod: GP | Performed by: PHYSICAL THERAPIST

## 2022-08-17 PROCEDURE — 99396 PREV VISIT EST AGE 40-64: CPT | Performed by: INTERNAL MEDICINE

## 2022-08-17 RX ORDER — ESCITALOPRAM OXALATE 10 MG/1
10 TABLET ORAL DAILY
Qty: 90 TABLET | Refills: 3 | Status: SHIPPED | OUTPATIENT
Start: 2022-08-17 | End: 2023-09-06

## 2022-08-17 SDOH — ECONOMIC STABILITY: TRANSPORTATION INSECURITY
IN THE PAST 12 MONTHS, HAS THE LACK OF TRANSPORTATION KEPT YOU FROM MEDICAL APPOINTMENTS OR FROM GETTING MEDICATIONS?: NO

## 2022-08-17 SDOH — ECONOMIC STABILITY: TRANSPORTATION INSECURITY
IN THE PAST 12 MONTHS, HAS LACK OF TRANSPORTATION KEPT YOU FROM MEETINGS, WORK, OR FROM GETTING THINGS NEEDED FOR DAILY LIVING?: NO

## 2022-08-17 SDOH — ECONOMIC STABILITY: INCOME INSECURITY: IN THE LAST 12 MONTHS, WAS THERE A TIME WHEN YOU WERE NOT ABLE TO PAY THE MORTGAGE OR RENT ON TIME?: NO

## 2022-08-17 SDOH — ECONOMIC STABILITY: INCOME INSECURITY: HOW HARD IS IT FOR YOU TO PAY FOR THE VERY BASICS LIKE FOOD, HOUSING, MEDICAL CARE, AND HEATING?: NOT HARD AT ALL

## 2022-08-17 SDOH — ECONOMIC STABILITY: FOOD INSECURITY: WITHIN THE PAST 12 MONTHS, THE FOOD YOU BOUGHT JUST DIDN'T LAST AND YOU DIDN'T HAVE MONEY TO GET MORE.: NEVER TRUE

## 2022-08-17 SDOH — HEALTH STABILITY: PHYSICAL HEALTH: ON AVERAGE, HOW MANY DAYS PER WEEK DO YOU ENGAGE IN MODERATE TO STRENUOUS EXERCISE (LIKE A BRISK WALK)?: 4 DAYS

## 2022-08-17 SDOH — HEALTH STABILITY: PHYSICAL HEALTH: ON AVERAGE, HOW MANY MINUTES DO YOU ENGAGE IN EXERCISE AT THIS LEVEL?: 20 MIN

## 2022-08-17 SDOH — ECONOMIC STABILITY: FOOD INSECURITY: WITHIN THE PAST 12 MONTHS, YOU WORRIED THAT YOUR FOOD WOULD RUN OUT BEFORE YOU GOT MONEY TO BUY MORE.: NEVER TRUE

## 2022-08-17 ASSESSMENT — ENCOUNTER SYMPTOMS
PARESTHESIAS: 0
HEARTBURN: 0
DIARRHEA: 0
COUGH: 0
HEADACHES: 0
JOINT SWELLING: 0
ARTHRALGIAS: 1
CHILLS: 0
CONSTIPATION: 0
PALPITATIONS: 0
SORE THROAT: 0
ABDOMINAL PAIN: 0
NERVOUS/ANXIOUS: 1
FEVER: 0
HEMATOCHEZIA: 0
WEAKNESS: 1
NAUSEA: 0
DYSURIA: 0
DIZZINESS: 0
MYALGIAS: 0
EYE PAIN: 0
FREQUENCY: 0
SHORTNESS OF BREATH: 1
HEMATURIA: 0

## 2022-08-17 ASSESSMENT — SOCIAL DETERMINANTS OF HEALTH (SDOH)
HOW OFTEN DO YOU GET TOGETHER WITH FRIENDS OR RELATIVES?: THREE TIMES A WEEK
HOW OFTEN DO YOU ATTEND CHURCH OR RELIGIOUS SERVICES?: PATIENT DECLINED
DO YOU BELONG TO ANY CLUBS OR ORGANIZATIONS SUCH AS CHURCH GROUPS UNIONS, FRATERNAL OR ATHLETIC GROUPS, OR SCHOOL GROUPS?: YES
IN A TYPICAL WEEK, HOW MANY TIMES DO YOU TALK ON THE PHONE WITH FAMILY, FRIENDS, OR NEIGHBORS?: TWICE A WEEK

## 2022-08-17 ASSESSMENT — LIFESTYLE VARIABLES
AUDIT-C TOTAL SCORE: 3
HOW OFTEN DO YOU HAVE SIX OR MORE DRINKS ON ONE OCCASION: NEVER
SKIP TO QUESTIONS 9-10: 1
HOW OFTEN DO YOU HAVE A DRINK CONTAINING ALCOHOL: 2-3 TIMES A WEEK
HOW MANY STANDARD DRINKS CONTAINING ALCOHOL DO YOU HAVE ON A TYPICAL DAY: 1 OR 2

## 2022-08-17 ASSESSMENT — PAIN SCALES - GENERAL: PAINLEVEL: NO PAIN (0)

## 2022-08-17 NOTE — PROGRESS NOTES
SUBJECTIVE:   CC: Bharath Mercado is an 56 year old male who presents for preventative health visit.       Healthy Habits:     Getting at least 3 servings of Calcium per day:  Yes    Bi-annual eye exam:  NO    Dental care twice a year:  NO    Sleep apnea or symptoms of sleep apnea:  Sleep apnea    Diet:  Regular (no restrictions)    Frequency of exercise:  2-3 days/week    Duration of exercise:  Less than 15 minutes    Taking medications regularly:  Yes    Medication side effects:  Not applicable    PHQ-2 Total Score: 1    Additional concerns today:  Yes    Ongoing issues with mentation and physical strength following COVID infection. Is being followed by post-covid clinic. Sx are slowly improving, but still noting difficulty w/ high level thinking, memory and right leg proximal thigh strength.     Anxiety is also an ongoing issue. Reviewed medications. He feels his medications are helping his sx.       Today's PHQ-2 Score:   PHQ-2 ( 1999 [employer]) 8/17/2022   Q1: Little interest or pleasure in doing things 1   Q2: Feeling down, depressed or hopeless 0   PHQ-2 Score 1   PHQ-2 Total Score (12-17 Years)- Positive if 3 or more points; Administer PHQ-A if positive -   Q1: Little interest or pleasure in doing things Several days   Q2: Feeling down, depressed or hopeless Not at all   PHQ-2 Score 1       Abuse: Current or Past(Physical, Sexual or Emotional)- No  Do you feel safe in your environment? Yes      Social History     Tobacco Use     Smoking status: Never Smoker     Smokeless tobacco: Never Used   Substance Use Topics     Alcohol use: Yes     Alcohol/week: 0.0 standard drinks     Comment: 2 x week       Alcohol Use 8/17/2022   Prescreen: >3 drinks/day or >7 drinks/week? No   Prescreen: >3 drinks/day or >7 drinks/week? -       Last PSA:   PSA   Date Value Ref Range Status   10/16/2019 0.30 0 - 4 ug/L Final     Comment:     Assay Method:  Chemiluminescence using Siemens Vista analyzer       Reviewed orders with  "patient. Reviewed health maintenance and updated orders accordingly - Yes      Review of Systems   Constitutional: Negative for chills and fever.   HENT: Negative for congestion, ear pain, hearing loss and sore throat.    Eyes: Negative for pain and visual disturbance.   Respiratory: Positive for shortness of breath. Negative for cough.    Cardiovascular: Negative for chest pain, palpitations and peripheral edema.   Gastrointestinal: Negative for abdominal pain, constipation, diarrhea, heartburn, hematochezia and nausea.   Genitourinary: Negative for dysuria, frequency, genital sores, hematuria, impotence, penile discharge and urgency.   Musculoskeletal: Positive for arthralgias. Negative for joint swelling and myalgias.   Neurological: Positive for weakness. Negative for dizziness, headaches and paresthesias.   Psychiatric/Behavioral: Negative for mood changes. The patient is nervous/anxious.        OBJECTIVE:   /62 (BP Location: Right arm, Patient Position: Sitting, Cuff Size: Adult Large)   Pulse 75   Temp 97.1  F (36.2  C) (Temporal)   Resp 12   Ht 1.816 m (5' 11.5\")   Wt 93.1 kg (205 lb 3.2 oz)   SpO2 97%   BMI 28.22 kg/m      Physical Exam  GENERAL: healthy, alert and no distress  EYES: PERRL, EOMI  HENT: ear canals and TM's normal  NECK: no adenopathy  RESP: lungs clear to auscultation - no rales, rhonchi or wheezes  CV: regular rate and rhythm, normal S1 S2, no murmur, no peripheral edema and peripheral pulses strong  ABDOMEN: soft, nontender, bowel sounds normal  MS: no gross musculoskeletal defects noted, no edema  SKIN: no suspicious lesions or rashes  NEURO: Normal strength and tone except right thigh abduction 4/5 strength.   PSYCH: mentation appears normal, affect normal/bright        ASSESSMENT/PLAN:       ICD-10-CM    1. Routine general medical examination at a health care facility  Z00.00    2. Screen for colon cancer  Z12.11 COLOGUARD([employer])   3. Anxiety  F41.9 escitalopram " "(LEXAPRO) 10 MG tablet     Refilled Lexapro.  Continue w/ post-COVID clinic f/u.       COUNSELING:   Reviewed preventive health counseling, as reflected in patient instructions    Estimated body mass index is 28.22 kg/m  as calculated from the following:    Height as of this encounter: 1.816 m (5' 11.5\").    Weight as of this encounter: 93.1 kg (205 lb 3.2 oz).         He reports that he has never smoked. He has never used smokeless tobacco.      Jose Enrique MD  [facility]  "

## 2022-09-07 ENCOUNTER — ANCILLARY PROCEDURE (OUTPATIENT)
Dept: CARDIOLOGY | Facility: CLINIC | Age: 57
End: 2022-09-07
Attending: INTERNAL MEDICINE
Payer: OTHER MISCELLANEOUS

## 2022-09-07 ENCOUNTER — PRE VISIT (OUTPATIENT)
Dept: PULMONOLOGY | Facility: CLINIC | Age: 57
End: 2022-09-07

## 2022-09-07 ENCOUNTER — OFFICE VISIT (OUTPATIENT)
Dept: PULMONOLOGY | Facility: CLINIC | Age: 57
End: 2022-09-07
Attending: INTERNAL MEDICINE
Payer: OTHER MISCELLANEOUS

## 2022-09-07 ENCOUNTER — ANCILLARY PROCEDURE (OUTPATIENT)
Dept: GENERAL RADIOLOGY | Facility: CLINIC | Age: 57
End: 2022-09-07
Payer: OTHER MISCELLANEOUS

## 2022-09-07 VITALS
WEIGHT: 205 LBS | OXYGEN SATURATION: 98 % | SYSTOLIC BLOOD PRESSURE: 145 MMHG | HEART RATE: 56 BPM | BODY MASS INDEX: 28.7 KG/M2 | DIASTOLIC BLOOD PRESSURE: 83 MMHG | HEIGHT: 71 IN

## 2022-09-07 DIAGNOSIS — I47.10 PAROXYSMAL SUPRAVENTRICULAR TACHYCARDIA (H): ICD-10-CM

## 2022-09-07 DIAGNOSIS — U09.9 POST-COVID CHRONIC DYSPNEA: ICD-10-CM

## 2022-09-07 DIAGNOSIS — T73.3XXA FATIGUE DUE TO EXCESSIVE EXERTION, INITIAL ENCOUNTER: ICD-10-CM

## 2022-09-07 DIAGNOSIS — R06.09 POST-COVID CHRONIC DYSPNEA: ICD-10-CM

## 2022-09-07 DIAGNOSIS — T73.3XXA FATIGUE DUE TO EXCESSIVE EXERTION, INITIAL ENCOUNTER: Primary | ICD-10-CM

## 2022-09-07 LAB
DLCOUNC-%PRED-PRE: 99 %
DLCOUNC-PRE: 29.39 ML/MIN/MMHG
DLCOUNC-PRED: 29.65 ML/MIN/MMHG
ERV-%PRED-PRE: 115 %
ERV-PRE: 1.52 L
ERV-PRED: 1.32 L
EXPTIME-PRE: 7.51 SEC
FEF2575-%PRED-PRE: 106 %
FEF2575-PRE: 3.55 L/SEC
FEF2575-PRED: 3.35 L/SEC
FEFMAX-%PRED-PRE: 102 %
FEFMAX-PRE: 10.05 L/SEC
FEFMAX-PRED: 9.84 L/SEC
FEV1-%PRED-PRE: 106 %
FEV1-PRE: 4.18 L
FEV1FEV6-PRE: 78 %
FEV1FEV6-PRED: 80 %
FEV1FVC-PRE: 78 %
FEV1FVC-PRED: 78 %
FEV1SVC-PRE: 78 %
FEV1SVC-PRED: 73 %
FIFMAX-PRE: 7 L/SEC
FRCPLETH-%PRED-PRE: 109 %
FRCPLETH-PRE: 4.01 L
FRCPLETH-PRED: 3.66 L
FVC-%PRED-PRE: 106 %
FVC-PRE: 5.38 L
FVC-PRED: 5.04 L
IC-%PRED-PRE: 95 %
IC-PRE: 3.84 L
IC-PRED: 4.02 L
LVEF ECHO: NORMAL
RVPLETH-%PRED-PRE: 104 %
RVPLETH-PRE: 2.49 L
RVPLETH-PRED: 2.38 L
TLCPLETH-%PRED-PRE: 105 %
TLCPLETH-PRE: 7.86 L
TLCPLETH-PRED: 7.43 L
VA-%PRED-PRE: 102 %
VA-PRE: 7.1 L
VC-%PRED-PRE: 100 %
VC-PRE: 5.37 L
VC-PRED: 5.35 L

## 2022-09-07 PROCEDURE — 94375 RESPIRATORY FLOW VOLUME LOOP: CPT | Performed by: INTERNAL MEDICINE

## 2022-09-07 PROCEDURE — 71046 X-RAY EXAM CHEST 2 VIEWS: CPT | Mod: GC | Performed by: RADIOLOGY

## 2022-09-07 PROCEDURE — 99204 OFFICE O/P NEW MOD 45 MIN: CPT | Mod: 25 | Performed by: INTERNAL MEDICINE

## 2022-09-07 PROCEDURE — G0463 HOSPITAL OUTPT CLINIC VISIT: HCPCS | Mod: 25

## 2022-09-07 PROCEDURE — 94726 PLETHYSMOGRAPHY LUNG VOLUMES: CPT | Performed by: INTERNAL MEDICINE

## 2022-09-07 PROCEDURE — 93306 TTE W/DOPPLER COMPLETE: CPT | Performed by: INTERNAL MEDICINE

## 2022-09-07 PROCEDURE — 94729 DIFFUSING CAPACITY: CPT | Performed by: INTERNAL MEDICINE

## 2022-09-07 ASSESSMENT — ENCOUNTER SYMPTOMS
BACK PAIN: 1
HEMOPTYSIS: 0
COUGH DISTURBING SLEEP: 0
NERVOUS/ANXIOUS: 1
SPUTUM PRODUCTION: 0
PANIC: 1
JOINT SWELLING: 0
DYSPNEA ON EXERTION: 1
SNORES LOUDLY: 1
ARTHRALGIAS: 0
MUSCLE CRAMPS: 0
NECK PAIN: 0
DECREASED CONCENTRATION: 1
MYALGIAS: 0
DEPRESSION: 0
WHEEZING: 0
COUGH: 0
SHORTNESS OF BREATH: 0
MUSCLE WEAKNESS: 1
POSTURAL DYSPNEA: 0
STIFFNESS: 0
INSOMNIA: 0

## 2022-09-07 NOTE — NURSING NOTE
Chief Complaint   Patient presents with     New Patient     New Covid chronic dyspnea      Vitals were taken and medications were reconciled.     Noris King RMA  12:50 PM

## 2022-09-07 NOTE — PROGRESS NOTES
[facility]    Name: Bharath Mercado MRN: [id number]     Age: 56 year old   YOB: 1965       Reason for Visit: Post-COVID dyspnea          HPI:   Bharath Mercado is a 56 year old male with history of obstructive sleep apnea (CPAP machine recalled), GERD and prior COVID-19 infection (fully immunized) who presents for chronic dyspnea post-COVID-19.    Briefly, diagnosed with COVID-19 pneumonia in early September 2021. Symptoms included fever, night sweats, cough, dyspnea, fatigue and generalized weakness/pain. Presented to ED for evaluation. Oxygen saturations were low-normal. CXR notable for bilateral infiltrates. D-dimer normal. Did not require supplemental oxygen or treatment with steroids/monoclonal antibodies. Symptoms improved almost back to baseline by November 2021. However, since then has noticed increased levels of generalized fatigue, dyspnea with exertion (especially with stairs) and infrequent night sweats. Evaluation with Neurology for lower extremity weakness is ongoing (aldolase, CK, TSH wnl; EMG/NCS pending). He is seeing PT/OT. 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    Denies fevers, significant weight loss (lost 20 lbs with acute COVID infection but has since gained that back), orthopnea, parosyxmal nocturnal dyspnea, chest pain, palpitations (has history of pSVT -> most recent episode was approximately six months ago), dysphagia, abdominal pain or other concerns at this time. Not using any inhalers for his symptoms.    Tobacco: lifelong nonsmoker  Occupation: general surgeon  Exposures: two dogs, one cat at home. Prior history of having a bird. Prior woodworking hobby.    10 point ROS performed and negative except for as noted in HPI.         Past Medical History:     Past Medical History:   Diagnosis Date     Borderline hyperlipidemia      Hernia, abdominal     inguinal      Palpitations     intermittent PSVT             Past Surgical History:      Past Surgical History:   Procedure Laterality Date     HC REPAIR ING HERNIA,5+Y/O,REDUCIBL      right inguinal     VASECTOMY       ZZC APPENDECTOMY               Social History:     Social History     Socioeconomic History     Marital status:      Spouse name: Not on file     Number of children: Not on file     Years of education: Not on file     Highest education level: Not on file   Occupational History     Not on file   Tobacco Use     Smoking status: Never Smoker     Smokeless tobacco: Never Used   Substance and Sexual Activity     Alcohol use: Yes     Alcohol/week: 0.0 standard drinks     Comment: 2 x week     Drug use: No     Sexual activity: Yes     Partners: Female   Other Topics Concern     Parent/sibling w/ CABG, MI or angioplasty before 65F 55M? Not Asked   Social History Narrative     Not on file     Social Determinants of Health     Financial Resource Strain: Low Risk      Difficulty of Paying Living Expenses: Not hard at all   Food Insecurity: No Food Insecurity     Worried About Running Out of Food in the Last Year: Never true     Ran Out of Food in the Last Year: Never true   Transportation Needs: No Transportation Needs     Lack of Transportation (Medical): No     Lack of Transportation (Non-Medical): No   Physical Activity: Insufficiently Active     Days of Exercise per Week: 4 days     Minutes of Exercise per Session: 20 min   Stress: Stress Concern Present     Feeling of Stress : Rather much   Social Connections: Unknown     Frequency of Communication with Friends and Family: Twice a week     Frequency of Social Gatherings with Friends and Family: Three times a week     Attends Islam Services: Patient refused     Active Member of Clubs or Organizations: Yes     Attends Club or Organization Meetings: Not on file     Marital Status:    Intimate Partner Violence: Not on file   Housing Stability: High Risk  "    Unable to Pay for Housing in the Last Year: No     Number of Places Lived in the Last Year: 3     Unstable Housing in the Last Year: No            Family History:     Family History   Problem Relation Age of Onset     Cerebrovascular Disease Mother      Cancer Father 68        bladder ca/lentigo maligna     C.A.D. Father 65        coronary stents     Cerebrovascular Disease Paternal Grandmother      Diabetes Paternal Grandmother         type 2     Diabetes Paternal Aunt         type 1, childhood onset     C.A.D. Paternal Aunt      Cancer - colorectal No family hx of      Prostate Cancer No family hx of              Allergies:     Allergies   Allergen Reactions     No Known Allergies              Medications:   escitalopram (LEXAPRO) 10 MG tablet, Take 1 tablet (10 mg) by mouth daily  multivitamin, therapeutic with minerals (THERA-VIT-M) TABS, Take 1 tablet by mouth daily  sildenafil (REVATIO) 20 MG tablet, Take 1 tablet (20 mg) by mouth daily as needed (erectile dysfunction)    No current facility-administered medications on file prior to visit.             Exam:   BP (!) 145/83 (BP Location: Right arm, Patient Position: Chair, Cuff Size: Adult Regular)   Pulse 56   Ht 1.803 m (5' 11\")   Wt 93 kg (205 lb)   SpO2 98%   BMI 28.59 kg/m      Gen: well-appearing, NAD  HEENT: No LAD, no oral lesions   CV: RRR, NMRG  Resp: CTAB  Abd: NDNTTP  Skin: no obvious rashes on gross evaluation  Extremities: no edema, WWP  Neuro: alert and appropriate, no focal abnormalities         Data:     8/1/2022  Hgb: 15.6  WBC/Eos: 5.8    CXR 9/7/2022: personally reviewed; no acute airspace or interstitial opacities; no cardiomegaly; no pleural effusions.    CT chest 12/2/2005:  IMPRESSION:    1.  No acute findings.  Specifically, no adenopathy seen in the supraclavicular regions.    2.  Tiny calcified granuloma right lung base.  Postoperative changes from a prior right inguinal hernia repair.      3.  Few small normal sized axillary " lymph nodes.    PFT 9/7/2022: personally reviewed; normal spirometry, lung volumes and diffusing capacity (uncorrected for hemoglobin). Normal flow-volume loops.    Stress echocardiogram 2/19/2016:  Interpretation Summary  - This was a normal stress echocardiogram with no evidence of stress-induced ischemia.  - Good exercise tolerance ( 11:00 Christophe protocol / 13.1 Mets )           Assessment and Plan:     Bharath Mercado is a 56 year old male with history of obstructive sleep apnea (CPAP machine recalled), GERD and prior COVID-19 infection (fully immunized) who presents for chronic dyspnea post-COVID-19.    Fortunately, workup thus far has been unrevealing with normal CXR and PFTs. Most likely, he is having long COVID symptoms related to prior COVID-19 pneumonia from September 2021. In general, these symptoms are expected to slowly improve over time. I do not see any evidence of neuromuscular disease contributing to his symptoms, however he is seeing Neurology for concerns of lower extremity weakness and has a pending EMG/NCS; serologic workup including CK, aldolase and TSH have been normal. With his ongoing dyspnea, fatigue and history of EFE and pSVT, we will obtain a complete transthoracic echocardiogram to assess for cardiomyopathy and evidence of pulmonary hypertension.    We provided reassurance that his workup is normal and that his symptoms should improve with time. If symptoms do not improve, or in fact worsen, we would recommend getting a cardiopulmonary exercise test (CPET) to further characterize his dyspnea and fatigue. A formal CTA  of the chest +/- V/Q scan would be indicated if his TTE suggests evidence of pulmonary hypertension (to rule out CTEPH).    Patient staffed with Dr. Humphrey. Return to clinic on an as-needed basis    Saqib Vanegas M.D.  Pulmonary and Critical Care Medicine Fellow  9/7/2022      I saw and evaluated patient with Fellow.  Case discussed - agree with note.  I reviewed PFT:  normal.  I reviewed today's CXR: normal (CXR a year ago showed bilat opacities when he had COVID-19).  If MENDOSA persists, would also consider a high res chest CT with expiratory images to evaluate for air trapping/small airways disease.  Also could consider methacholine challenge to assess for reactive airways disease.    MACO MONTOYA M.D.        Answers for HPI/ROS submitted by the patient on 9/7/2022  General Symptoms: No  Skin Symptoms: No  HENT Symptoms: No  EYE SYMPTOMS: No  HEART SYMPTOMS: No  LUNG SYMPTOMS: Yes  INTESTINAL SYMPTOMS: No  URINARY SYMPTOMS: No  REPRODUCTIVE SYMPTOMS: No  SKELETAL SYMPTOMS: Yes  BLOOD SYMPTOMS: No  NERVOUS SYSTEM SYMPTOMS: No  MENTAL HEALTH SYMPTOMS: Yes  Cough: No  Sputum or phlegm: No  Coughing up blood: No  Difficulty breating or shortness of breath: No  Snoring: Yes  Wheezing: No  Difficulty breathing on exertion: Yes  Nighttime Cough: No  Difficulty breathing when lying flat: No  Back pain: Yes  Muscle aches: No  Neck pain: No  Swollen joints: No  Joint pain: No  Bone pain: No  Muscle cramps: No  Muscle weakness: Yes  Joint stiffness: No  Bone fracture: No  Nervous or Anxious: Yes  Depression: No  Trouble sleeping: No  Trouble thinking or concentrating: Yes  Mood changes: No  Panic attacks: Yes

## 2022-09-07 NOTE — LETTER
9/7/2022         RE: Bharath Mercado  [address] [facility] [address]        Dear Colleague,    Thank you for referring your patient, Bharath Mercado, to the [facility] [place]. Please see a copy of my visit note below.    [facility]    Name: Bharath Mercado MRN: [id number]     Age: 56 year old   YOB: 1965       Reason for Visit: Post-COVID dyspnea          HPI:   Bharath Mercado is a 56 year old male with history of obstructive sleep apnea (CPAP machine recalled), GERD and prior COVID-19 infection (fully immunized) who presents for chronic dyspnea post-COVID-19.    Briefly, diagnosed with COVID-19 pneumonia in early September 2021. Symptoms included fever, night sweats, cough, dyspnea, fatigue and generalized weakness/pain. Presented to ED for evaluation. Oxygen saturations were low-normal. CXR notable for bilateral infiltrates. D-dimer normal. Did not require supplemental oxygen or treatment with steroids/monoclonal antibodies. Symptoms improved almost back to baseline by November 2021. However, since then has noticed increased levels of generalized fatigue, dyspnea with exertion (especially with stairs) and infrequent night sweats. Evaluation with Neurology for lower extremity weakness is ongoing (aldolase, CK, TSH wnl; EMG/NCS pending). He is seeing PT/OT. 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    Denies fevers, significant weight loss (lost 20 lbs with acute COVID infection but has since gained that back), orthopnea, parosyxmal nocturnal dyspnea, chest pain, palpitations (has history of pSVT -> most recent episode was approximately six months ago), dysphagia, abdominal pain or other concerns at this time. Not using any inhalers for his symptoms.    Tobacco: lifelong nonsmoker  Occupation: general surgeon  Exposures: two  dogs, one cat at home. Prior history of having a bird. Prior woodworking hobby.    10 point ROS performed and negative except for as noted in HPI.         Past Medical History:     Past Medical History:   Diagnosis Date     Borderline hyperlipidemia      Hernia, abdominal     inguinal     Palpitations     intermittent PSVT             Past Surgical History:      Past Surgical History:   Procedure Laterality Date     HC REPAIR ING HERNIA,5+Y/O,REDUCIBL      right inguinal     VASECTOMY       ZZC APPENDECTOMY               Social History:     Social History     Socioeconomic History     Marital status:      Spouse name: Not on file     Number of children: Not on file     Years of education: Not on file     Highest education level: Not on file   Occupational History     Not on file   Tobacco Use     Smoking status: Never Smoker     Smokeless tobacco: Never Used   Substance and Sexual Activity     Alcohol use: Yes     Alcohol/week: 0.0 standard drinks     Comment: 2 x week     Drug use: No     Sexual activity: Yes     Partners: Female   Other Topics Concern     Parent/sibling w/ CABG, MI or angioplasty before 65F 55M? Not Asked   Social History Narrative     Not on file     Social Determinants of Health     Financial Resource Strain: Low Risk      Difficulty of Paying Living Expenses: Not hard at all   Food Insecurity: No Food Insecurity     Worried About Running Out of Food in the Last Year: Never true     Ran Out of Food in the Last Year: Never true   Transportation Needs: No Transportation Needs     Lack of Transportation (Medical): No     Lack of Transportation (Non-Medical): No   Physical Activity: Insufficiently Active     Days of Exercise per Week: 4 days     Minutes of Exercise per Session: 20 min   Stress: Stress Concern Present     Feeling of Stress : Rather much   Social Connections: Unknown     Frequency of Communication with Friends and Family: Twice a week     Frequency of Social Gatherings with  "Friends and Family: Three times a week     Attends Scientology Services: Patient refused     Active Member of Clubs or Organizations: Yes     Attends Club or Organization Meetings: Not on file     Marital Status:    Intimate Partner Violence: Not on file   Housing Stability: High Risk     Unable to Pay for Housing in the Last Year: No     Number of Places Lived in the Last Year: 3     Unstable Housing in the Last Year: No            Family History:     Family History   Problem Relation Age of Onset     Cerebrovascular Disease Mother      Cancer Father 68        bladder ca/lentigo maligna     C.A.D. Father 65        coronary stents     Cerebrovascular Disease Paternal Grandmother      Diabetes Paternal Grandmother         type 2     Diabetes Paternal Aunt         type 1, childhood onset     C.A.D. Paternal Aunt      Cancer - colorectal No family hx of      Prostate Cancer No family hx of              Allergies:     Allergies   Allergen Reactions     No Known Allergies              Medications:   escitalopram (LEXAPRO) 10 MG tablet, Take 1 tablet (10 mg) by mouth daily  multivitamin, therapeutic with minerals (THERA-VIT-M) TABS, Take 1 tablet by mouth daily  sildenafil (REVATIO) 20 MG tablet, Take 1 tablet (20 mg) by mouth daily as needed (erectile dysfunction)    No current facility-administered medications on file prior to visit.             Exam:   BP (!) 145/83 (BP Location: Right arm, Patient Position: Chair, Cuff Size: Adult Regular)   Pulse 56   Ht 1.803 m (5' 11\")   Wt 93 kg (205 lb)   SpO2 98%   BMI 28.59 kg/m      Gen: well-appearing, NAD  HEENT: No LAD, no oral lesions   CV: RRR, NMRG  Resp: CTAB  Abd: NDNTTP  Skin: no obvious rashes on gross evaluation  Extremities: no edema, WWP  Neuro: alert and appropriate, no focal abnormalities         Data:     8/1/2022  Hgb: 15.6  WBC/Eos: 5.8    CXR 9/7/2022: personally reviewed; no acute airspace or interstitial opacities; no cardiomegaly; no pleural " effusions.    CT chest 12/2/2005:  IMPRESSION:    1.  No acute findings.  Specifically, no adenopathy seen in the supraclavicular regions.    2.  Tiny calcified granuloma right lung base.  Postoperative changes from a prior right inguinal hernia repair.      3.  Few small normal sized axillary lymph nodes.    PFT 9/7/2022: personally reviewed; normal spirometry, lung volumes and diffusing capacity (uncorrected for hemoglobin). Normal flow-volume loops.    Stress echocardiogram 2/19/2016:  Interpretation Summary  - This was a normal stress echocardiogram with no evidence of stress-induced ischemia.  - Good exercise tolerance ( 11:00 Christophe protocol / 13.1 Mets )           Assessment and Plan:     Bharath Mercado is a 56 year old male with history of obstructive sleep apnea (CPAP machine recalled), GERD and prior COVID-19 infection (fully immunized) who presents for chronic dyspnea post-COVID-19.    Fortunately, workup thus far has been unrevealing with normal CXR and PFTs. Most likely, he is having long COVID symptoms related to prior COVID-19 pneumonia from September 2021. In general, these symptoms are expected to slowly improve over time. I do not see any evidence of neuromuscular disease contributing to his symptoms, however he is seeing Neurology for concerns of lower extremity weakness and has a pending EMG/NCS; serologic workup including CK, aldolase and TSH have been normal. With his ongoing dyspnea, fatigue and history of EFE and pSVT, we will obtain a complete transthoracic echocardiogram to assess for cardiomyopathy and evidence of pulmonary hypertension.    We provided reassurance that his workup is normal and that his symptoms should improve with time. If symptoms do not improve, or in fact worsen, we would recommend getting a cardiopulmonary exercise test (CPET) to further characterize his dyspnea and fatigue. A formal CTA  of the chest +/- V/Q scan would be indicated if his TTE suggests evidence of  pulmonary hypertension (to rule out CTEPH).    Patient staffed with Dr. Montoya. Return to clinic on an as-needed basis    Saqib Vanegas M.D.  Pulmonary and Critical Care Medicine Fellow  9/7/2022      I saw and evaluated patient with Fellow.  Case discussed - agree with note.  I reviewed PFT: normal.  I reviewed today's CXR: normal (CXR a year ago showed bilat opacities when he had COVID-19).  If MENDOSA persists, would also consider a high res chest CT with expiratory images to evaluate for air trapping/small airways disease.  Also could consider methacholine challenge to assess for reactive airways disease.    MACO MONTOYA M.D.        Answers for HPI/ROS submitted by the patient on 9/7/2022  General Symptoms: No  Skin Symptoms: No  HENT Symptoms: No  EYE SYMPTOMS: No  HEART SYMPTOMS: No  LUNG SYMPTOMS: Yes  INTESTINAL SYMPTOMS: No  URINARY SYMPTOMS: No  REPRODUCTIVE SYMPTOMS: No  SKELETAL SYMPTOMS: Yes  BLOOD SYMPTOMS: No  NERVOUS SYSTEM SYMPTOMS: No  MENTAL HEALTH SYMPTOMS: Yes  Cough: No  Sputum or phlegm: No  Coughing up blood: No  Difficulty breating or shortness of breath: No  Snoring: Yes  Wheezing: No  Difficulty breathing on exertion: Yes  Nighttime Cough: No  Difficulty breathing when lying flat: No  Back pain: Yes  Muscle aches: No  Neck pain: No  Swollen joints: No  Joint pain: No  Bone pain: No  Muscle cramps: No  Muscle weakness: Yes  Joint stiffness: No  Bone fracture: No  Nervous or Anxious: Yes  Depression: No  Trouble sleeping: No  Trouble thinking or concentrating: Yes  Mood changes: No  Panic attacks: Yes          Again, thank you for allowing me to participate in the care of your patient.        Sincerely,        Saqib Vanegas MD

## 2022-09-14 ENCOUNTER — HOSPITAL ENCOUNTER (OUTPATIENT)
Dept: PHYSICAL THERAPY | Facility: CLINIC | Age: 57
Setting detail: THERAPIES SERIES
Discharge: HOME | End: 2022-09-14
Attending: INTERNAL MEDICINE
Payer: OTHER MISCELLANEOUS

## 2022-09-14 PROCEDURE — 97140 MANUAL THERAPY 1/> REGIONS: CPT | Mod: GP | Performed by: PHYSICAL THERAPIST

## 2022-09-14 PROCEDURE — 97110 THERAPEUTIC EXERCISES: CPT | Mod: GP | Performed by: PHYSICAL THERAPIST

## 2022-09-29 ENCOUNTER — OFFICE VISIT (OUTPATIENT)
Dept: NEUROLOGY | Facility: CLINIC | Age: 57
End: 2022-09-29
Attending: PSYCHIATRY & NEUROLOGY
Payer: OTHER MISCELLANEOUS

## 2022-09-29 DIAGNOSIS — G60.8 SENSORY POLYNEUROPATHY: Primary | ICD-10-CM

## 2022-09-29 DIAGNOSIS — G56.21 ULNAR NEUROPATHY AT ELBOW OF RIGHT UPPER EXTREMITY: ICD-10-CM

## 2022-09-29 DIAGNOSIS — M62.81 PROXIMAL LIMB MUSCLE WEAKNESS: ICD-10-CM

## 2022-09-29 PROCEDURE — 95911 NRV CNDJ TEST 9-10 STUDIES: CPT | Performed by: PSYCHIATRY & NEUROLOGY

## 2022-09-29 PROCEDURE — 95937 NEUROMUSCULAR JUNCTION TEST: CPT | Performed by: PSYCHIATRY & NEUROLOGY

## 2022-09-29 PROCEDURE — 95886 MUSC TEST DONE W/N TEST COMP: CPT | Performed by: PSYCHIATRY & NEUROLOGY

## 2022-09-29 PROCEDURE — 95885 MUSC TST DONE W/NERV TST LIM: CPT | Mod: 59 | Performed by: PSYCHIATRY & NEUROLOGY

## 2022-09-29 NOTE — PROGRESS NOTES
[facility]  Electrodiagnostic Laboratory                 Department of Neurology                                                                                                         Test Date:  2022    Patient: Sanjay Mercado : 1965 Physician: Foreign Laureano MD   Sex: Male AGE: 57 year Ref Phys: Montez Bettencourt MD   ID#: [id number]   Technician: Kristy Behling     Clinical Information:    57 year old man who contracted COVID19 in 2021 and was bedridden for a while. Subsequently he noted proximal leg muscle weakness which is not improving significantly to date. He has no sensory complaints. CK and aldolase levels are normal. Exam shows hip flexion strength of 4/5 bilaterally, but otherwise leg and upper extremity muscle strength are normal. Query myopathy, neuromuscular junction disorder, motor neuron disease.     Techniques:    Motor and sensory conduction studies were done with surface recording electrodes. EMG was done with a concentric needle electrode.     Results:    Right fibular (EDB), tibial (AH), and median (APB) motor NCSs were normal. The right ulnar (ADM) motor NCS showed normal distal latency and CMAP amplitudes, normal conduction velocity at the forearm, and mildly attenuated conduction velocity across the elbow. Right median, ulnar, radial, and sural antidromic sensory NCSs all showed mildly attenuated SNAP amplitudes and normal conduction velocities. Right superficial peroneal SNAP was absent. Right median and ulnar F-wave latencies were normal. Right tibial F-wave latencies were mildly prolonged. 3 Hz repetitive nerve stimulation of the right ulnar nerve recorded from ADM was normal at rest as well as after 1 min of maximal isometric exercise. Needle EMG of the right TA, medial gastrocnemius, vastus lateralis, gluteus medius, iliopsoas, deltoid, and triceps, was normal     Interpretation:    This study is mildly abnormal. There is 1)  diffuse attenuation of sensory nerve amplitudes possibly consistent with a mild length dependent sensory polyneuropathy. Clinical correlation is required as the patient does not have any sensory complaints, and 2) A possible mild right ulnar neuropathy at the elbow. Importantly, there is no electrodiagnostic evidence of myopathy, neuromuscular junction disorder, or motor neuron disease from this study.       ___________________________  Foreign Laureano MD        Nerve Conduction Studies  Motor Sites      Latency Amplitude Neg. Amp Diff Segment Distance Velocity Neg. Dur Neg Area Diff Temperature Comment   Site (ms) Norm (mV) Norm %  cm m/s Norm ms %  C    Right Fibular (EDB) Motor   Ankle 3.6  < 6.0 3.0  > 2.5  Ankle-EDB 8   5.8  33.5    Bel Fib Head 12.8 - 2.6 - -13.3 Bel Fib Head-Ankle 38 41  > 38 6.6 1.89 33.5    Pop Fossa 14.6 - 2.4 - -7.7 Pop Fossa-Bel Fib Head 9 50  > 38 6.6 0.93 33.5    Right Median (APB) Motor   Wrist 3.4  < 4.4 9.7  > 5.0  Wrist-APB 8   4.5  33.3    Elbow 8.0 - 8.2 - -15.5 Elbow-Wrist 23 50  > 48 6.0 -7.7 33.4    Right Tibial (AHB) Motor   Ankle 3.3  < 6.5 4.8  > 4.4  Ankle-AHB 8   5.9  33.4    Knee 14.0 - 2.9 - -39.6 Knee-Ankle 42 39  > 38 7.6 -34.6 33.4    Right Ulnar (ADM) Motor   Wrist 2.7  < 3.5 9.1  > 5.0  Wrist-ADM 8   4.9  33.3    Bel Elbow 6.6 - 7.8 - -14.3 Bel Elbow-Wrist 20 51  > 48 5.5 6.4 33.2    Abv Elbow 8.9 - 7.4 - -5.1 Abv Elbow-Bel Elbow 10 43  > 48 5.8 -0.86 33.3      Sensory Sites      Onset Lat Peak Lat Amp (O-P) Amp (P-P) Segment Distance Velocity Temperature Comment   Site ms ms  V Norm  V  cm m/s Norm  C    Right Median Sensory   Wrist-Dig II 2.9 3.6 9  > 10 11 Wrist-Dig II 14 48  > 48 33.5    Right Radial Sensory   Forearm-Wrist 2.0 2.7 13  > 15 18 Forearm-Wrist 10 50 - 33.4    Right Superficial Fibular Sensory   14 cm-Ankle NR NR NR  > 3 NR 14 cm-Ankle 12.5 NR  > 38 32.3    Right Sural Sensory   Calf-Lat Mall 3.6 4.5 5  > 5 4 Calf-Lat Mall 14 39  > 38 33.5     Right Ulnar Sensory   Wrist-Dig V 2.4 3.1 8  > 8 5 Wrist-Dig V 12.5 52  > 48 33.3      F Wave Studies     Min-F Max-F Dispersion Persistence Mean-F F-Norm L-R Mean-F L-R Mean-F Norm F/M Ratio F-M Lat (ms)   Right Median (Abd Poll Brev)  33.4  C   29.38 32.42 3.04 85.71 30.68 <33  <2.2 2.34 26.48   Right Tibial (Abd Hallucis)  33.3  C   65.78 67.19 1.41 100.00 66.75 <61  <5.7 5.42 61.25   Right Ulnar (Abd Dig Min)  33.3  C   31.48 35.70 4.22 70.00 32.91 <36  <2.5 1.55 28.91     RNS     Trial # Label Amp 1 (mV)  O-P Amp 4 (mV)  O-P Amp % Dif Area 1 (mV ms) Area 4 (mV ms) Area % Dif Rep Rate Train Length Pause Time (min:sec) Comments   Right Abductor Digiti Minimi   Tr 1 Baseline 9.25 9.24 -0.2 29.24 28.04 -4.1 3.00 6 00:30    Tr 2 Post Exercise 8.22 8.39 2.1 28.37 26.21 -7.6 3.00 6 01:00    Tr 3 1 min Post 7.76 9.05 16.6 27.12 27.99 3.2 3.00 6 01:00    Tr 4 2 min Post 8.37 8.50 1.6 28.87 27.54 -4.6 3.00 6 01:00    Tr 5 3 min Post 9.02 8.32 -7.8 30.08 27.93 -7.2 3.00 6 00:00        Electromyography     Side Muscle Ins Act Fibs/PSW Fasc HF Amp Dur Poly Recrt Int Pat   Right Tib Anterior Nml None Nml 0 Nml Nml 0 Nml Nml   Right Gastroc MH Nml None Nml 0 Nml Nml 0 Nml Nml   Right Vastus Lat Nml None Nml 0 Nml Nml 0 Nml Nml   Right Gluteus Med Nml None Nml 0 Nml Nml 0 Nml Nml   Right Iliopsoas Nml None Nml 0 Nml Nml 0 Nml Nml   Right Triceps Nml None Nml 0 Nml Nml 0 Nml Nml   Right Deltoid Nml None Nml 0 Nml Nml 0 Nml Nml         NCS Waveforms:    Motor                Sensory                          Ultrasound Images:

## 2022-09-29 NOTE — LETTER
2022       RE: Bharath Mercado   [address] [facility] [id number]     Dear Colleague,    Thank you for referring your patient, Bharath Mercado, to the [facility] EMG CLINIC [facility]. Please see a copy of my visit note below.                        [facility]  Electrodiagnostic Laboratory                 Department of Neurology                                                                                                         Test Date:  2022    Patient: Sanjay Mercado : 1965 Physician: Foreign Laureano MD   Sex: Male AGE: 57 year Ref Phys: Montez Bettencourt MD   ID#: [id number]   Technician: Kristy Behling     Clinical Information:    57 year old man who contracted COVID19 in 2021 and was bedridden for a while. Subsequently he noted proximal leg muscle weakness which is not improving significantly to date. He has no sensory complaints. CK and aldolase levels are normal. Exam shows hip flexion strength of 4/5 bilaterally, but otherwise leg and upper extremity muscle strength are normal. Query myopathy, neuromuscular junction disorder, motor neuron disease.     Techniques:    Motor and sensory conduction studies were done with surface recording electrodes. EMG was done with a concentric needle electrode.     Results:    Right fibular (EDB), tibial (AH), and median (APB) motor NCSs were normal. The right ulnar (ADM) motor NCS showed normal distal latency and CMAP amplitudes, normal conduction velocity at the forearm, and mildly attenuated conduction velocity across the elbow. Right median, ulnar, radial, and sural antidromic sensory NCSs all showed mildly attenuated SNAP amplitudes and normal conduction velocities. Right superficial peroneal SNAP was absent. Right median and ulnar F-wave latencies were normal. Right tibial F-wave latencies were mildly prolonged. 3 Hz repetitive nerve  stimulation of the right ulnar nerve recorded from ADM was normal at rest as well as after 1 min of maximal isometric exercise. Needle EMG of the right TA, medial gastrocnemius, vastus lateralis, gluteus medius, iliopsoas, deltoid, and triceps, was normal     Interpretation:    This study is mildly abnormal. There is 1) diffuse attenuation of sensory nerve amplitudes possibly consistent with a mild length dependent sensory polyneuropathy. Clinical correlation is required as the patient does not have any sensory complaints, and 2) A possible mild right ulnar neuropathy at the elbow. Importantly, there is no electrodiagnostic evidence of myopathy, neuromuscular junction disorder, or motor neuron disease from this study.       ___________________________  Foreign Laureano MD        Nerve Conduction Studies  Motor Sites      Latency Amplitude Neg. Amp Diff Segment Distance Velocity Neg. Dur Neg Area Diff Temperature Comment   Site (ms) Norm (mV) Norm %  cm m/s Norm ms %  C    Right Fibular (EDB) Motor   Ankle 3.6  < 6.0 3.0  > 2.5  Ankle-EDB 8   5.8  33.5    Bel Fib Head 12.8 - 2.6 - -13.3 Bel Fib Head-Ankle 38 41  > 38 6.6 1.89 33.5    Pop Fossa 14.6 - 2.4 - -7.7 Pop Fossa-Bel Fib Head 9 50  > 38 6.6 0.93 33.5    Right Median (APB) Motor   Wrist 3.4  < 4.4 9.7  > 5.0  Wrist-APB 8   4.5  33.3    Elbow 8.0 - 8.2 - -15.5 Elbow-Wrist 23 50  > 48 6.0 -7.7 33.4    Right Tibial (AHB) Motor   Ankle 3.3  < 6.5 4.8  > 4.4  Ankle-AHB 8   5.9  33.4    Knee 14.0 - 2.9 - -39.6 Knee-Ankle 42 39  > 38 7.6 -34.6 33.4    Right Ulnar (ADM) Motor   Wrist 2.7  < 3.5 9.1  > 5.0  Wrist-ADM 8   4.9  33.3    Bel Elbow 6.6 - 7.8 - -14.3 Bel Elbow-Wrist 20 51  > 48 5.5 6.4 33.2    Abv Elbow 8.9 - 7.4 - -5.1 Abv Elbow-Bel Elbow 10 43  > 48 5.8 -0.86 33.3      Sensory Sites      Onset Lat Peak Lat Amp (O-P) Amp (P-P) Segment Distance Velocity Temperature Comment   Site ms ms  V Norm  V  cm m/s Norm  C    Right Median Sensory   Wrist-Dig II  2.9 3.6 9  > 10 11 Wrist-Dig II 14 48  > 48 33.5    Right Radial Sensory   Forearm-Wrist 2.0 2.7 13  > 15 18 Forearm-Wrist 10 50 - 33.4    Right Superficial Fibular Sensory   14 cm-Ankle NR NR NR  > 3 NR 14 cm-Ankle 12.5 NR  > 38 32.3    Right Sural Sensory   Calf-Lat Mall 3.6 4.5 5  > 5 4 Calf-Lat Mall 14 39  > 38 33.5    Right Ulnar Sensory   Wrist-Dig V 2.4 3.1 8  > 8 5 Wrist-Dig V 12.5 52  > 48 33.3      F Wave Studies     Min-F Max-F Dispersion Persistence Mean-F F-Norm L-R Mean-F L-R Mean-F Norm F/M Ratio F-M Lat (ms)   Right Median (Abd Poll Brev)  33.4  C   29.38 32.42 3.04 85.71 30.68 <33  <2.2 2.34 26.48   Right Tibial (Abd Hallucis)  33.3  C   65.78 67.19 1.41 100.00 66.75 <61  <5.7 5.42 61.25   Right Ulnar (Abd Dig Min)  33.3  C   31.48 35.70 4.22 70.00 32.91 <36  <2.5 1.55 28.91     RNS     Trial # Label Amp 1 (mV)  O-P Amp 4 (mV)  O-P Amp % Dif Area 1 (mV ms) Area 4 (mV ms) Area % Dif Rep Rate Train Length Pause Time (min:sec) Comments   Right Abductor Digiti Minimi   Tr 1 Baseline 9.25 9.24 -0.2 29.24 28.04 -4.1 3.00 6 00:30    Tr 2 Post Exercise 8.22 8.39 2.1 28.37 26.21 -7.6 3.00 6 01:00    Tr 3 1 min Post 7.76 9.05 16.6 27.12 27.99 3.2 3.00 6 01:00    Tr 4 2 min Post 8.37 8.50 1.6 28.87 27.54 -4.6 3.00 6 01:00    Tr 5 3 min Post 9.02 8.32 -7.8 30.08 27.93 -7.2 3.00 6 00:00        Electromyography     Side Muscle Ins Act Fibs/PSW Fasc HF Amp Dur Poly Recrt Int Pat   Right Tib Anterior Nml None Nml 0 Nml Nml 0 Nml Nml   Right Gastroc MH Nml None Nml 0 Nml Nml 0 Nml Nml   Right Vastus Lat Nml None Nml 0 Nml Nml 0 Nml Nml   Right Gluteus Med Nml None Nml 0 Nml Nml 0 Nml Nml   Right Iliopsoas Nml None Nml 0 Nml Nml 0 Nml Nml   Right Triceps Nml None Nml 0 Nml Nml 0 Nml Nml   Right Deltoid Nml None Nml 0 Nml Nml 0 Nml Nml         NCS Waveforms:    Motor                Sensory                          Ultrasound Images:          Again, thank you for allowing me to participate in the care of your patient.       Sincerely,    Foreign Laureano MD

## 2022-10-05 ENCOUNTER — HOSPITAL ENCOUNTER (OUTPATIENT)
Dept: PHYSICAL THERAPY | Facility: CLINIC | Age: 57
Setting detail: THERAPIES SERIES
Discharge: HOME | End: 2022-10-05
Attending: INTERNAL MEDICINE
Payer: OTHER MISCELLANEOUS

## 2022-10-05 PROCEDURE — 97110 THERAPEUTIC EXERCISES: CPT | Mod: GP | Performed by: PHYSICAL THERAPIST

## 2022-10-11 ASSESSMENT — ENCOUNTER SYMPTOMS
SHORTNESS OF BREATH: 1
TINGLING: 0
HEADACHES: 0
DEPRESSION: 0
COUGH DISTURBING SLEEP: 0
DIZZINESS: 0
FEVER: 0
ALTERED TEMPERATURE REGULATION: 0
TROUBLE SWALLOWING: 0
DISTURBANCES IN COORDINATION: 0
WEIGHT LOSS: 0
NIGHT SWEATS: 1
SORE THROAT: 0
WHEEZING: 0
SINUS PAIN: 0
TREMORS: 0
SMELL DISTURBANCE: 0
POLYDIPSIA: 0
MUSCLE CRAMPS: 0
SINUS CONGESTION: 1
NECK MASS: 0
STIFFNESS: 0
WEAKNESS: 1
HALLUCINATIONS: 0
PANIC: 1
NUMBNESS: 0
MYALGIAS: 1
WEIGHT GAIN: 0
FATIGUE: 1
SPEECH CHANGE: 0
NERVOUS/ANXIOUS: 1
POLYPHAGIA: 0
MUSCLE WEAKNESS: 1
INSOMNIA: 0
SNORES LOUDLY: 1
TASTE DISTURBANCE: 0
COUGH: 0
NAIL CHANGES: 0
DYSPNEA ON EXERTION: 1
NECK PAIN: 1
HOARSE VOICE: 0
LOSS OF CONSCIOUSNESS: 0
SKIN CHANGES: 0
POOR WOUND HEALING: 0
MEMORY LOSS: 0
HEMOPTYSIS: 0
SEIZURES: 0
PARALYSIS: 0
SPUTUM PRODUCTION: 0
INCREASED ENERGY: 0
BACK PAIN: 0
DECREASED APPETITE: 0
JOINT SWELLING: 0
ARTHRALGIAS: 1
CHILLS: 0
DECREASED CONCENTRATION: 1
POSTURAL DYSPNEA: 0

## 2022-10-11 ASSESSMENT — ANXIETY QUESTIONNAIRES
GAD7 TOTAL SCORE: 4
1. FEELING NERVOUS, ANXIOUS, OR ON EDGE: MORE THAN HALF THE DAYS
GAD7 TOTAL SCORE: 4
7. FEELING AFRAID AS IF SOMETHING AWFUL MIGHT HAPPEN: NOT AT ALL
GAD7 TOTAL SCORE: 4
4. TROUBLE RELAXING: SEVERAL DAYS
3. WORRYING TOO MUCH ABOUT DIFFERENT THINGS: NOT AT ALL
6. BECOMING EASILY ANNOYED OR IRRITABLE: NOT AT ALL
7. FEELING AFRAID AS IF SOMETHING AWFUL MIGHT HAPPEN: NOT AT ALL
2. NOT BEING ABLE TO STOP OR CONTROL WORRYING: NOT AT ALL
5. BEING SO RESTLESS THAT IT IS HARD TO SIT STILL: SEVERAL DAYS
IF YOU CHECKED OFF ANY PROBLEMS ON THIS QUESTIONNAIRE, HOW DIFFICULT HAVE THESE PROBLEMS MADE IT FOR YOU TO DO YOUR WORK, TAKE CARE OF THINGS AT HOME, OR GET ALONG WITH OTHER PEOPLE: SOMEWHAT DIFFICULT
8. IF YOU CHECKED OFF ANY PROBLEMS, HOW DIFFICULT HAVE THESE MADE IT FOR YOU TO DO YOUR WORK, TAKE CARE OF THINGS AT HOME, OR GET ALONG WITH OTHER PEOPLE?: SOMEWHAT DIFFICULT

## 2022-10-11 ASSESSMENT — PATIENT HEALTH QUESTIONNAIRE - PHQ9
10. IF YOU CHECKED OFF ANY PROBLEMS, HOW DIFFICULT HAVE THESE PROBLEMS MADE IT FOR YOU TO DO YOUR WORK, TAKE CARE OF THINGS AT HOME, OR GET ALONG WITH OTHER PEOPLE: SOMEWHAT DIFFICULT
SUM OF ALL RESPONSES TO PHQ QUESTIONS 1-9: 3
SUM OF ALL RESPONSES TO PHQ QUESTIONS 1-9: 3

## 2022-10-12 ENCOUNTER — VIRTUAL VISIT (OUTPATIENT)
Dept: PHYSICAL MEDICINE AND REHAB | Facility: CLINIC | Age: 57
End: 2022-10-12
Payer: OTHER MISCELLANEOUS

## 2022-10-12 DIAGNOSIS — U09.9 POST-COVID CHRONIC CONCENTRATION DEFICIT: Primary | ICD-10-CM

## 2022-10-12 DIAGNOSIS — R06.09 POST-COVID CHRONIC DYSPNEA: ICD-10-CM

## 2022-10-12 DIAGNOSIS — U09.9 POST-COVID CHRONIC DYSPNEA: ICD-10-CM

## 2022-10-12 DIAGNOSIS — R41.840 POST-COVID CHRONIC CONCENTRATION DEFICIT: Primary | ICD-10-CM

## 2022-10-12 PROCEDURE — 99215 OFFICE O/P EST HI 40 MIN: CPT | Mod: 95 | Performed by: INTERNAL MEDICINE

## 2022-10-12 NOTE — PROGRESS NOTES
"Sanjay is a 57 year old who is being evaluated via a billable video visit.      Patient denies any changes since echeck-in regarding medication and allergies and states all information entered during echeck-in remains accurate.    How would you like to obtain your AVS? MyChart  If the video visit is dropped, the invitation should be resent by: Text to cell phone: [phone]  Will anyone else be joining your video visit? No          Assessment & Plan     Post-COVID chronic concentration deficit  Discussed possible causes of concentration deficits with patient.  Advised on chronic fatigue with an impact on cognitive function.  Recommend neuropsychology evaluation as patient feels that his issues are somewhat impacting his work.    - Adult Neuropsychology Referral; Future    Post-COVID chronic dyspnea  Reviewed gradual increase in exercise following COVID-19.  Given some degree of dyspnea, recommend pulmonary rehab.  Referral was placed today.  - Pulmonary Rehab Referral; Future      I spent a total of 40 minutes on the day of the visit.   Time spent doing chart review, history and exam, documentation and further activities per the note       BMI:   Estimated body mass index is 28.59 kg/m  as calculated from the following:    Height as of 9/7/22: 1.803 m (5' 11\").    Weight as of 9/7/22: 93 kg (205 lb).           Return in about 4 months (around 2/12/2023).    Clarisa Melara MD  [facility] PHYSICAL MEDICINE AND REHABILITATION CLINIC [place]    Subjective   Sanjay is a 57 year old, presenting for the following health issues:  Video Visit    Video Visit      HPI     Patient is following up on post-COVID issues. He reports that he is doing a little better. He has been working with PT on strengthening exercises. He states that he is able to go on longer walks, able to use a bike. He has been seen by Neurology as well. Most of the testing has been normal. Mild neuropathy was seen in his upper extremities. He " has been doing OK with work. He also feels that his anxiety is under better control. He has noticed significant improvement in anxiety with Lexapro. He has noticed that keeping track of some things is still difficult.         Current concerns: Health Concerns    PHQ Assesment Total Score(s) 10/11/2022   PHQ-9 Score 3   Some recent data might be hidden     VIDA-7 Results 10/11/2022   VIDA 7 TOTAL SCORE 4 (minimal anxiety)   Some recent data might be hidden     PTSD Screen Score 10/11/2022   Have you ever experienced this kind of event? Yes   PTSD Screen (Score of 3 or more suggests positive screen) 3   Some recent data might be hidden     No flowsheet data found.          Past Medical History:   Diagnosis Date     Borderline hyperlipidemia      Hernia, abdominal     inguinal     Palpitations     intermittent PSVT       Past Surgical History:   Procedure Laterality Date     HC REPAIR ING HERNIA,5+Y/O,REDUCIBL      right inguinal     VASECTOMY       ZZC APPENDECTOMY         Family History   Problem Relation Age of Onset     Cerebrovascular Disease Mother      Cancer Father 68        bladder ca/lentigo maligna     C.A.D. Father 65        coronary stents     Cerebrovascular Disease Paternal Grandmother      Diabetes Paternal Grandmother         type 2     Diabetes Paternal Aunt         type 1, childhood onset     C.A.D. Paternal Aunt      Cancer - colorectal No family hx of      Prostate Cancer No family hx of        Social History     Tobacco Use     Smoking status: Never     Smokeless tobacco: Never   Substance Use Topics     Alcohol use: Yes     Alcohol/week: 0.0 standard drinks     Comment: 2 x week     Drug use: No         Current Outpatient Medications:      escitalopram (LEXAPRO) 10 MG tablet, Take 1 tablet (10 mg) by mouth daily, Disp: 90 tablet, Rfl: 3     multivitamin, therapeutic with minerals (THERA-VIT-M) TABS, Take 1 tablet by mouth daily, Disp: , Rfl:      sildenafil (REVATIO) 20 MG tablet, Take 1 tablet (20  mg) by mouth daily as needed (erectile dysfunction), Disp: 30 tablet, Rfl: 3    Review of Systems   Constitutional: Positive for fatigue. Negative for chills and fever.   HENT: Negative for ear discharge, ear pain, hearing loss, mouth sores, nosebleeds, sinus pain, sore throat, tinnitus and trouble swallowing.    Respiratory: Positive for shortness of breath. Negative for cough and wheezing.    Endocrine: Negative for polydipsia and polyphagia.   Genitourinary: Negative for penile discharge.   Musculoskeletal: Positive for arthralgias, myalgias and neck pain. Negative for back pain and joint swelling.   Skin: Positive for rash.   Neurological: Positive for weakness. Negative for dizziness, tremors, seizures, numbness and headaches.   Psychiatric/Behavioral: Positive for decreased concentration. Negative for hallucinations. The patient is nervous/anxious.             Objective           Vitals:  No vitals were obtained today due to virtual visit.    Physical Exam   GENERAL: Healthy, alert and no distress  EYES: Eyes grossly normal to inspection.  No discharge or erythema, or obvious scleral/conjunctival abnormalities.  RESP: No audible wheeze, cough, or visible cyanosis.  No visible retractions or increased work of breathing.    SKIN: Visible skin clear. No significant rash, abnormal pigmentation or lesions.  NEURO: Cranial nerves grossly intact.  Mentation and speech appropriate for age.  PSYCH: Mentation appears normal, affect normal/bright, judgement and insight intact, normal speech and appearance well-groomed.                Video-Visit Details    Video Start Time: 1:00 PM    Type of service:  Video Visit    Video End Time:1:35 PM    Originating Location (pt. Location): Home    Distant Location (provider location):  [facility] PHYSICAL MEDICINE AND REHABILITATION CLINIC [place]     Platform used for Video Visit: TotalTakeout    Answers for HPI/ROS submitted by the patient on 10/11/2022  If you checked off  any problems, how difficult have these problems made it for you to do your work, take care of things at home, or get along with other people?: Somewhat difficult  PHQ9 TOTAL SCORE: 3  VIDA 7 TOTAL SCORE: 4  General Symptoms: Yes  Skin Symptoms: Yes  HENT Symptoms: Yes  EYE SYMPTOMS: No  HEART SYMPTOMS: No  LUNG SYMPTOMS: Yes  INTESTINAL SYMPTOMS: No  URINARY SYMPTOMS: No  REPRODUCTIVE SYMPTOMS: Yes  SKELETAL SYMPTOMS: Yes  BLOOD SYMPTOMS: No  NERVOUS SYSTEM SYMPTOMS: Yes  MENTAL HEALTH SYMPTOMS: Yes  Congestion: Yes   Voice hoarseness: No  Tooth pain: No  Gum tenderness: No  Bleeding gums: No  Change in taste: No  Change in sense of smell: No  Dry mouth: Yes  Hearing aid used: No  Neck lump: No  Loss of appetite: No  Weight loss: No  Weight gain: No  Night sweats: Yes  Increased stress: Yes  Feeling hot or cold when others believe the temperature is normal: No  Loss of height: No  Post-operative complications: No  Surgical site pain: No  Change in or Loss of Energy: No  Hyperactivity: No  Confusion: No  Changes in hair: No  Changes in moles/birth marks: No  Itching: Yes  Changes in nails: No  Acne: No  Change in facial hair: No  Warts: No  Non-healing sores: No  Scarring: No  Flaking of skin: Yes  Color changes of hands/feet in cold : No  Sun sensitivity: No  Skin thickening: No  Sputum or phlegm: No  Coughing up blood: No  Snoring: Yes  Difficulty breathing on exertion: Yes  Nighttime Cough: No  Difficulty breathing when lying flat: No  Scrotal pain or swelling: No  Erectile dysfunction: No  Genital ulcers: No  Reduced libido: Yes  Bone pain: No  Muscle cramps: No  Muscle weakness: Yes  Joint stiffness: No  Bone fracture: No  Trouble with coordination: No  Fainting or black-out spells: No  Memory loss: No  Speech problems: No  Tingling: No  Difficulty walking: Yes  Paralysis: No  Depression: No  Trouble sleeping: No  Mood changes: No  Panic attacks: Yes

## 2022-10-16 ASSESSMENT — ENCOUNTER SYMPTOMS
CHILLS: 0
NERVOUS/ANXIOUS: 1
JOINT SWELLING: 0
MYALGIAS: 1
DECREASED CONCENTRATION: 1
POLYDIPSIA: 0
TREMORS: 0
BACK PAIN: 0
FEVER: 0
POLYPHAGIA: 0
WHEEZING: 0
NUMBNESS: 0
WEAKNESS: 1
SINUS PAIN: 0
HALLUCINATIONS: 0
ARTHRALGIAS: 1
SEIZURES: 0
TROUBLE SWALLOWING: 0
COUGH: 0
HEADACHES: 0
NECK PAIN: 1
FATIGUE: 1
SORE THROAT: 0
SHORTNESS OF BREATH: 1
DIZZINESS: 0

## 2022-10-26 NOTE — PROGRESS NOTES
[facility] Rehabilitation [facility]    Outpatient Physical Therapy Discharge Note  Patient: Bharath Mercado  : 1965    Beginning/End Dates of Reporting Period:  22 to 10/5/22. Sanjay was seen for a total of 6 visits in this POC, including his evaluation. Treatment included therapeutic exercise, manual therapy and instruction in home exercise program.     Referring Provider: Clarisa Melara MD    Therapy Diagnosis: decreased activity tolerance in the context of post-covid fatigue     Client Self Report: Sanjay has done some 3.5 mile walks and went well, more tired than at baseline. Less SOB these days, still a little on stairs, still climnbing in step to pattern at times. Coming down stairs seems more challenging. PFT was norm. EMG norm in legs.    Objective Measurements:     6 min walk test- 401.4 meters, improved from 384 meters at [facility] (638-689 m avg for age/gender). Activity tolerance improved in regards to level of fatigue (see goal status below)     FACIT- 37.92/52; improved from 35/52 at [facility]     Goals:  Goal Identifier FACIT:   Goal Description Pt to score >/= 43/52 on FACIT reporting no more than 'somewhat' fatigued across all measures, reflecting improved QOL less effected by fatigue   Target Date 22   Date Met      Progress (detail required for progress note): goal in progress 22: score 37.92/52- improvement of 2.92. pt reporting only a little bit or somewhat limited by fatigue at this time.  at [facility] 22: 35/52 (higher the score the better QOL), on 3 measures reporting 'quite a bit' limited by fatigue.     Goal Identifier 6MWT:   Goal Description Pt to complete >/= 638 m during 6MWT to demonstrate significant improvement in walking endurance (50 m = MDC) and be considered WNL for age/gender matched norms, appropriate for returning to hiking and longer walks with his dog.   Target Date 22    Date Met      Progress (detail required for progress note): goal in progress. 6 min walk test (20' path, multiple turns): 401.4 meters, 75 bpm HR, 98% spO2, fatigue following 4/10. pt walks regularly, distances more close to a mile, uphills are noticable. at [facility] 22: (20' path, multiple turns) 384 meters (638-689 m avg for age/gender) no AD, reports glute fatigue after 2 minutes, fatigue followin/10, HR 88 bpm, O2sat 96. PLOF: hiking, walking 22-24 miles in a day. Taking dog for longer walks.     Goal Identifier STAIRS:   Goal Description Pt to negotiate stairs with and without carrying laundry with appropraite body mechanics, stability and fatigue in order to improve negotiating stairs in home with greater ease   Target Date 22   Date Met      Progress (detail required for progress note): goal in progress. Takes more energy than supposed to, weakness, hip pain.     Goal Identifier HEP   Goal Description Pt to report consistancy and demonstrate appropraite response to progressing strength and aerobic exercise HEP for progress towards all goals and continued self maintanance following d/c from therapy   Target Date 22   Date Met      Progress (detail required for progress note): goal met     Progress towards goals: Sanjay is making slow but steady gains in regards to activity tolerance. He continues to report feeling of weakness in his legs. He has demo improved breathing pattern with use of diaphragm. There is some tightness in chest mm L>R. SOB is less but still mildly present at times ie on stairs. Functionally he is not limited day to day in ADLs but still is not back to the level of activity he was prior to his illness, walking/hiking 20+ miles at a time. Sanjay is indep in HEP to address the above stated deficits and will be transitioning into pulmonary rehab. It is recommended he continue with his HEP for stretching and strengthening to continue work toward PLOF.     Plan:  Discharge from  therapy.    Discharge:    Reason for Discharge: progress with indep HEP    Discharge Plan: Patient to continue home program.  Other services: Pulmonary rehab.

## 2022-10-29 LAB — NONINV COLON CA DNA+OCC BLD SCRN STL QL: NEGATIVE

## 2022-11-01 ENCOUNTER — OFFICE VISIT (OUTPATIENT)
Dept: NEUROLOGY | Facility: CLINIC | Age: 57
End: 2022-11-01
Payer: OTHER MISCELLANEOUS

## 2022-11-01 VITALS
DIASTOLIC BLOOD PRESSURE: 85 MMHG | HEART RATE: 66 BPM | SYSTOLIC BLOOD PRESSURE: 142 MMHG | BODY MASS INDEX: 28.7 KG/M2 | HEIGHT: 71 IN | WEIGHT: 205 LBS | OXYGEN SATURATION: 100 %

## 2022-11-01 DIAGNOSIS — G60.8 SENSORY POLYNEUROPATHY: Primary | ICD-10-CM

## 2022-11-01 DIAGNOSIS — M62.81 PROXIMAL LIMB MUSCLE WEAKNESS: ICD-10-CM

## 2022-11-01 PROCEDURE — 99214 OFFICE O/P EST MOD 30 MIN: CPT | Mod: GC | Performed by: STUDENT IN AN ORGANIZED HEALTH CARE EDUCATION/TRAINING PROGRAM

## 2022-11-01 NOTE — PATIENT INSTRUCTIONS
- We will get some additional blood work, including vitamin B12 and protein immunofixation to round out your work up for polyneuropathy, which was diagnosed on your recent EMG.    - You can take over the counter magnesium oxide 400 mg daily for cramps.  - Follow up appointment in 4 months

## 2022-11-01 NOTE — PROGRESS NOTES
[facility]/[place]  Section of General Neurology  Return Patient Visit    Bharath Mercado MRN# [id number]   Age: 57 year old YOB: 1965     Brief history of symptoms: The patient was initially seen in neurologic consultation on July 26, 2022 for evaluation of proximal lower extremity weakness status post severe COVID-19 infection. Please see the comprehensive neurologic consultation note from that date in the Epic records for details.          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  Patient was sent for EMG which was negative for neurologic or myopathic disease that would explain patient's proximal leg weakness.  EMG did however show a length dependent polyneuropathy which was evident on vibratory exam today.  Recommend further neuropathy work-up with vitamin B12 and immunofixation studies to further explain possible etiology, as patient is not diabetic nor an alcoholic.  Though likely unrelated, it is unclear if this patient's polyneuropathy is related to his limb weakness.  It is likely that this patient is suffering from long-haul COVID and recovery will be gradual.  Recommend continue to exercise at home and to call office if desires additional physical therapy referral.    Patient currently has neuropsych testing and a sleep study scheduled.  We will follow-up with patient after the studies and continue to monitor as he recovers.     Plan:  -Immunofixation and vitamin B12 studies for further work-up of newly found length dependent polyneuropathy on EMG.  -Follow-up in 4 months to monitor for recovery and discuss neuropsych findings        KM Rivas D.O.  Resident Physician of Neurology  [facility]    Patient discussed with my supervising physician Dr. Ca, who agrees with the  critical findings, assessment, and plan as documented in the note above or as otherwise in their attestation.        Interval history:   Patient completed course of physical therapy.  He states today that he may be feels mildly improved.  However is still having shortness of breath and leg weakness after climbing stairs and walking long distances.          Past Medical History:     Patient Active Problem List   Diagnosis     Esophageal reflux     CARDIOVASCULAR SCREENING; LDL GOAL LESS THAN 160     Nonallopathic lesion of lumbar region     EFE (obstructive sleep apnea)     Past Medical History:   Diagnosis Date     Borderline hyperlipidemia      Hernia, abdominal     inguinal     Palpitations     intermittent PSVT        Past Surgical History:     Past Surgical History:   Procedure Laterality Date     HC REPAIR ING HERNIA,5+Y/O,REDUCIBL      right inguinal     VASECTOMY       ZZC APPENDECTOMY          Social History:     Social History     Tobacco Use     Smoking status: Never     Smokeless tobacco: Never   Substance Use Topics     Alcohol use: Yes     Alcohol/week: 0.0 standard drinks     Comment: 2 x week     Drug use: No        Family History:     Family History   Problem Relation Age of Onset     Cerebrovascular Disease Mother      Cancer Father 68        bladder ca/lentigo maligna     C.A.D. Father 65        coronary stents     Cerebrovascular Disease Paternal Grandmother      Diabetes Paternal Grandmother         type 2     Diabetes Paternal Aunt         type 1, childhood onset     C.A.D. Paternal Aunt      Cancer - colorectal No family hx of      Prostate Cancer No family hx of         Medications:     Current Outpatient Medications   Medication Sig     escitalopram (LEXAPRO) 10 MG tablet Take 1 tablet (10 mg) by mouth daily     multivitamin, therapeutic with minerals (THERA-VIT-M) TABS Take 1 tablet by mouth daily     sildenafil (REVATIO) 20 MG tablet Take 1 tablet (20 mg) by mouth daily as needed (erectile  "dysfunction)     No current facility-administered medications for this visit.        Allergies:     Allergies   Allergen Reactions     No Known Allergies           Physical Exam:   Vitals: BP (!) 142/85   Pulse 66   Ht 1.803 m (5' 11\")   Wt 93 kg (205 lb)   SpO2 100%   BMI 28.59 kg/m     CV: peripheral pulse appreciated  Lungs: breathing comfortably  Extremities: no edema  Skin: No rashes    Neuro:   General Appearance: No apparent distress, well-nourished, well-groomed, pleasant     Mental Status: Alert and oriented to person, place, and time. Speech fluent and comprehension intact. No dysarthria. Normal memory, fund of knowledge, attention/concentration, and language    Cranial Nerves:   II: Visual fields: normal  III: Pupils: 3 mm, equal, round, reactive to light   III,IV,VI: Extraocular Movements: intact   VII: Facial strength: intact without asymmetry  VIII: Hearing: intact grossly  XII: Tongue movement: normal     Motor Exam:   Upper Extremities  Deltoid  Bicep  Tricep  Wrist Extensors  strength Intrinsic Muscles    Right  5  5  5  5 5 5    Left  5  5  5  5 5 5      Lower Extremities  Hip Flexors  Knee Extensors  Knee   Flexors  Dorsi Flexion  Plantar   Flexion    Right  4+  5  5  5  5    Left  5  5  5  5  5        No drift is present. No abnormal movements. Tone is normal throughout.    Sensory: intact to light touch, and pinprick throughout, patient endorses decreased vibratory sensation on right medial malleolus and big toe paired to the left.  Vibratory sensation intact and pronounced in the upper extremities.    Coordination: no dysmetria with finger-to-nose bilaterally    Reflexes: biceps, triceps, brachioradialis, patellar, and ankle jerks 2+ and symmetric. Toes are downgoing bilaterally    Gait: Deferred         Data: Pertinent prior to visit   Imaging:  None    Procedures:  EMG 9/29/2022  This study is mildly abnormal. There is 1) diffuse attenuation of sensory nerve amplitudes possibly " consistent with a mild length dependent sensory polyneuropathy. Clinical correlation is required as the patient does not have any sensory complaints, and 2) A possible mild right ulnar neuropathy at the elbow. Importantly, there is no electrodiagnostic evidence of myopathy, neuromuscular junction disorder, or motor neuron disease from this study.       Laboratory:  Acetylcholine binding antibody negative  CK normal  Aldolase normal  CBC normal  CMP normal  A1c 5.5  TSH 1.54 - WNL

## 2022-11-01 NOTE — NURSING NOTE
"Bharath Mercado is a 57 year old male who presents for:  Chief Complaint   Patient presents with     Follow Up     EMG review        Initial Vitals:  BP (!) 142/85   Pulse 66   Ht 1.803 m (5' 11\")   Wt 93 kg (205 lb)   SpO2 100%   BMI 28.59 kg/m   Estimated body mass index is 28.59 kg/m  as calculated from the following:    Height as of this encounter: 1.803 m (5' 11\").    Weight as of this encounter: 93 kg (205 lb).. Body surface area is 2.16 meters squared. BP completed using cuff size: regular    Nursing Comments:     Esequiel King    "

## 2022-11-01 NOTE — LETTER
11/1/2022         RE: Bharath Mercado  [address] [facility] [address]        Dear Colleague,    Thank you for referring your patient, Bharath Mercado, to the [facility]. Please see a copy of my visit note below.    [facility]  Section of General Neurology  Return Patient Visit    Bharath Mercado MRN# [id number]   Age: 57 year old YOB: 1965     Brief history of symptoms: The patient was initially seen in neurologic consultation on July 26, 2022 for evaluation of proximal lower extremity weakness status post severe COVID-19 infection. Please see the comprehensive neurologic consultation note from that date in the Epic records for details.          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  Patient was sent for EMG which was negative for neurologic or myopathic disease that would explain patient's proximal leg weakness.  EMG did however show a length dependent polyneuropathy which was evident on vibratory exam today.  Recommend further neuropathy work-up with vitamin B12 and immunofixation studies to further explain possible etiology, as patient is not diabetic nor an alcoholic.  Though likely unrelated, it is unclear if this patient's polyneuropathy is related to his limb weakness.  It is likely that this patient is suffering from long-haul COVID and recovery will be gradual.  Recommend continue to exercise at home and to call office if desires additional physical therapy referral.    Patient currently has neuropsych testing and a sleep study scheduled.  We will follow-up with patient after the studies and continue to monitor as he recovers.     Plan:  -Immunofixation and vitamin B12 studies for further work-up of newly found length dependent polyneuropathy on  EMG.  -Follow-up in 4 months to monitor for recovery and discuss neuropsych findings        KM Rivas D.O.  Resident Physician of Neurology  [facility]    Patient discussed with my supervising physician Dr. Ca, who agrees with the critical findings, assessment, and plan as documented in the note above or as otherwise in their attestation.        Interval history:   Patient completed course of physical therapy.  He states today that he may be feels mildly improved.  However is still having shortness of breath and leg weakness after climbing stairs and walking long distances.          Past Medical History:     Patient Active Problem List   Diagnosis     Esophageal reflux     CARDIOVASCULAR SCREENING; LDL GOAL LESS THAN 160     Nonallopathic lesion of lumbar region     EFE (obstructive sleep apnea)     Past Medical History:   Diagnosis Date     Borderline hyperlipidemia      Hernia, abdominal     inguinal     Palpitations     intermittent PSVT        Past Surgical History:     Past Surgical History:   Procedure Laterality Date     HC REPAIR ING HERNIA,5+Y/O,REDUCIBL      right inguinal     VASECTOMY       ZZC APPENDECTOMY          Social History:     Social History     Tobacco Use     Smoking status: Never     Smokeless tobacco: Never   Substance Use Topics     Alcohol use: Yes     Alcohol/week: 0.0 standard drinks     Comment: 2 x week     Drug use: No        Family History:     Family History   Problem Relation Age of Onset     Cerebrovascular Disease Mother      Cancer Father 68        bladder ca/lentigo maligna     C.A.D. Father 65        coronary stents     Cerebrovascular Disease Paternal Grandmother      Diabetes Paternal Grandmother         type 2     Diabetes Paternal Aunt         type 1, childhood onset     C.A.D. Paternal Aunt      Cancer - colorectal No family hx of      Prostate Cancer No family hx of         Medications:     Current Outpatient Medications  "  Medication Sig     escitalopram (LEXAPRO) 10 MG tablet Take 1 tablet (10 mg) by mouth daily     multivitamin, therapeutic with minerals (THERA-VIT-M) TABS Take 1 tablet by mouth daily     sildenafil (REVATIO) 20 MG tablet Take 1 tablet (20 mg) by mouth daily as needed (erectile dysfunction)     No current facility-administered medications for this visit.        Allergies:     Allergies   Allergen Reactions     No Known Allergies           Physical Exam:   Vitals: BP (!) 142/85   Pulse 66   Ht 1.803 m (5' 11\")   Wt 93 kg (205 lb)   SpO2 100%   BMI 28.59 kg/m     CV: peripheral pulse appreciated  Lungs: breathing comfortably  Extremities: no edema  Skin: No rashes    Neuro:   General Appearance: No apparent distress, well-nourished, well-groomed, pleasant     Mental Status: Alert and oriented to person, place, and time. Speech fluent and comprehension intact. No dysarthria. Normal memory, fund of knowledge, attention/concentration, and language    Cranial Nerves:   II: Visual fields: normal  III: Pupils: 3 mm, equal, round, reactive to light   III,IV,VI: Extraocular Movements: intact   VII: Facial strength: intact without asymmetry  VIII: Hearing: intact grossly  XII: Tongue movement: normal     Motor Exam:   Upper Extremities  Deltoid  Bicep  Tricep  Wrist Extensors  strength Intrinsic Muscles    Right  5  5  5  5 5 5    Left  5  5  5  5 5 5      Lower Extremities  Hip Flexors  Knee Extensors  Knee   Flexors  Dorsi Flexion  Plantar   Flexion    Right  4+  5  5  5  5    Left  5  5  5  5  5        No drift is present. No abnormal movements. Tone is normal throughout.    Sensory: intact to light touch, and pinprick throughout, patient endorses decreased vibratory sensation on right medial malleolus and big toe paired to the left.  Vibratory sensation intact and pronounced in the upper extremities.    Coordination: no dysmetria with finger-to-nose bilaterally    Reflexes: biceps, triceps, brachioradialis, " patellar, and ankle jerks 2+ and symmetric. Toes are downgoing bilaterally    Gait: Deferred         Data: Pertinent prior to visit   Imaging:  None    Procedures:  EMG 9/29/2022  This study is mildly abnormal. There is 1) diffuse attenuation of sensory nerve amplitudes possibly consistent with a mild length dependent sensory polyneuropathy. Clinical correlation is required as the patient does not have any sensory complaints, and 2) A possible mild right ulnar neuropathy at the elbow. Importantly, there is no electrodiagnostic evidence of myopathy, neuromuscular junction disorder, or motor neuron disease from this study.       Laboratory:  Acetylcholine binding antibody negative  CK normal  Aldolase normal  CBC normal  CMP normal  A1c 5.5  TSH 1.54 - WNL        Attestation signed by Brigido Ca MD at 11/2/2022  9:59 AM:  Attending Attestation    I saw and evaluated the patient on 11/2/22 and agree with the findings and the plan of care as documented in the resident's note.       I personally reviewed vital signs, medications, labs and pertinent imaging.    Reviewed EMG with Dr. Mercado as noted in Dr. Rivas's note.  Would recommend we round out AAN guideline neuropathy labs in this regard.  Vibration impaired to testing today most prominently in R great toe >R ankle.  It does seem with a combination of therapy and time he is improving though as noted I would still grade R hip flexion at 4+/5.  I think he has a good plan with our COVID clinic and emphasized this continued multi disciplinary approach.  I would hope with more time, therapy and upcoming cardiac/pulmonary emphasis as well that he could continue to improve.  All questions answered.  Remainder of plan as noted in body of note.      Endy Ca MD   of Neurology  [facility]        Again, thank you for allowing me to participate in the care of your patient.        Sincerely,        Navjot Rivas MD

## 2022-11-02 ENCOUNTER — HOSPITAL ENCOUNTER (OUTPATIENT)
Dept: CARDIAC REHAB | Facility: CLINIC | Age: 57
Discharge: HOME | End: 2022-11-02
Attending: INTERNAL MEDICINE
Payer: OTHER MISCELLANEOUS

## 2022-11-02 DIAGNOSIS — R06.09 POST-COVID CHRONIC DYSPNEA: ICD-10-CM

## 2022-11-02 DIAGNOSIS — U09.9 POST-COVID CHRONIC DYSPNEA: ICD-10-CM

## 2022-11-02 PROCEDURE — 94626 PHY/QHP OP PULM RHB W/MNTR: CPT

## 2022-11-15 ENCOUNTER — VIRTUAL VISIT (OUTPATIENT)
Dept: PEDIATRICS | Facility: CLINIC | Age: 57
End: 2022-11-15
Payer: COMMERCIAL

## 2022-11-15 DIAGNOSIS — G47.59 OTHER PARASOMNIA: Primary | ICD-10-CM

## 2022-11-15 PROCEDURE — 99213 OFFICE O/P EST LOW 20 MIN: CPT | Mod: 95 | Performed by: INTERNAL MEDICINE

## 2022-11-15 RX ORDER — CLONAZEPAM 0.5 MG/1
0.5 TABLET ORAL
Qty: 30 TABLET | Refills: 2 | Status: SHIPPED | OUTPATIENT
Start: 2022-11-15

## 2022-11-15 NOTE — PROGRESS NOTES
Sanjay is a 57 year old who is being evaluated via a billable telephone  visit.    Difficulty connecting video, changed to telephone visit    Assessment & Plan       ICD-10-CM    1. Other parasomnia  G47.59 clonazePAM (KLONOPIN) 0.5 MG tablet        Sx potentially could be reduced with clonazepam use. Will send in an initial prescription.  He can take as needed.  Reviewed not taking while on call, with alcohol and should ensure he knows if the medication will affect him the following morning prior to taking on a work night.  He has a visit scheduled with sleep clinic - he can review with them as well.     Jose Enrique MD  [facility]    Subjective   Sanjay is a 57 year old, presenting for the following health issues:      HPI       Parasomnias  Over the past few years, he has initiated intercourse as he was falling asleep.  A few times, he did not recall initiating the intercourse.  Has EFE, uses nightly. He is about to get a new CPAP machine.  He does have a sleep clinic visit in February.    Sometimes does have alcohol prior to bedtime. This can increase his issue.     He has researched and noted that clonazepam may be helpful.         Objective         Vitals:  No vitals were obtained today due to virtual visit.    Physical Exam   GEN: No distress  PSYCH: Alert, affect is normal  RESP: No cough, no audible wheezing, able to talk in full sentences  Remainder of exam unable to be completed due to telephone visit    Total telephone time: 15 minutes    Distant Location (provider location):  Off-site

## 2022-12-05 ENCOUNTER — HOSPITAL ENCOUNTER (OUTPATIENT)
Dept: CARDIAC REHAB | Facility: CLINIC | Age: 57
Discharge: HOME | End: 2022-12-05
Attending: INTERNAL MEDICINE
Payer: OTHER MISCELLANEOUS

## 2022-12-05 PROCEDURE — 94625 PHY/QHP OP PULM RHB W/O MNTR: CPT

## 2022-12-07 ENCOUNTER — HOSPITAL ENCOUNTER (OUTPATIENT)
Dept: CARDIAC REHAB | Facility: CLINIC | Age: 57
Discharge: HOME | End: 2022-12-07
Attending: INTERNAL MEDICINE
Payer: OTHER MISCELLANEOUS

## 2022-12-07 PROCEDURE — 94626 PHY/QHP OP PULM RHB W/MNTR: CPT

## 2022-12-14 ENCOUNTER — HOSPITAL ENCOUNTER (OUTPATIENT)
Dept: CARDIAC REHAB | Facility: CLINIC | Age: 57
Discharge: HOME | End: 2022-12-14
Attending: INTERNAL MEDICINE
Payer: OTHER MISCELLANEOUS

## 2022-12-14 PROCEDURE — 93797 PHYS/QHP OP CAR RHAB WO ECG: CPT | Mod: XU

## 2022-12-14 PROCEDURE — 94625 PHY/QHP OP PULM RHB W/O MNTR: CPT

## 2022-12-21 ENCOUNTER — HOSPITAL ENCOUNTER (OUTPATIENT)
Dept: CARDIAC REHAB | Facility: CLINIC | Age: 57
Discharge: HOME | End: 2022-12-21
Attending: INTERNAL MEDICINE
Payer: OTHER MISCELLANEOUS

## 2022-12-21 PROCEDURE — 94625 PHY/QHP OP PULM RHB W/O MNTR: CPT

## 2022-12-26 ENCOUNTER — HOSPITAL ENCOUNTER (OUTPATIENT)
Dept: CARDIAC REHAB | Facility: CLINIC | Age: 57
Discharge: HOME | End: 2022-12-26
Attending: INTERNAL MEDICINE
Payer: OTHER MISCELLANEOUS

## 2022-12-26 PROCEDURE — 94625 PHY/QHP OP PULM RHB W/O MNTR: CPT

## 2023-01-06 ENCOUNTER — TELEPHONE (OUTPATIENT)
Dept: PEDIATRICS | Facility: CLINIC | Age: 58
End: 2023-01-06
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
## 2023-01-10 NOTE — TELEPHONE ENCOUNTER
Central Prior Authorization Team  Phone: [phone]    Prior Authorization Not Needed per Insurance    Medication: escitalopram (LEXAPRO) 10 MG tablet  Insurance Company: [employer] - Phone [phone] Fax [phone]  Expected CoPay:      Pharmacy Filling the Rx: [id number] [place] [address]  Pharmacy Notified:  Yes   Patient Notified:

## 2023-01-16 ENCOUNTER — HOSPITAL ENCOUNTER (OUTPATIENT)
Dept: CARDIAC REHAB | Facility: CLINIC | Age: 58
Discharge: HOME | End: 2023-01-16
Attending: INTERNAL MEDICINE
Payer: OTHER MISCELLANEOUS

## 2023-01-16 PROCEDURE — 94625 PHY/QHP OP PULM RHB W/O MNTR: CPT

## 2023-01-18 ENCOUNTER — HOSPITAL ENCOUNTER (OUTPATIENT)
Dept: CARDIAC REHAB | Facility: CLINIC | Age: 58
Discharge: HOME | End: 2023-01-18
Attending: INTERNAL MEDICINE
Payer: OTHER MISCELLANEOUS

## 2023-01-18 PROCEDURE — 94625 PHY/QHP OP PULM RHB W/O MNTR: CPT

## 2023-01-25 ENCOUNTER — HOSPITAL ENCOUNTER (OUTPATIENT)
Dept: CARDIAC REHAB | Facility: CLINIC | Age: 58
Discharge: HOME | End: 2023-01-25
Attending: INTERNAL MEDICINE
Payer: OTHER MISCELLANEOUS

## 2023-01-25 PROCEDURE — 94625 PHY/QHP OP PULM RHB W/O MNTR: CPT

## 2023-01-30 ENCOUNTER — HOSPITAL ENCOUNTER (OUTPATIENT)
Dept: CARDIAC REHAB | Facility: CLINIC | Age: 58
Discharge: HOME | End: 2023-01-30
Attending: INTERNAL MEDICINE
Payer: OTHER MISCELLANEOUS

## 2023-01-30 PROCEDURE — 94625 PHY/QHP OP PULM RHB W/O MNTR: CPT

## 2023-02-01 ENCOUNTER — LAB (OUTPATIENT)
Dept: LAB | Facility: CLINIC | Age: 58
End: 2023-02-01
Payer: COMMERCIAL

## 2023-02-01 DIAGNOSIS — G60.8 SENSORY POLYNEUROPATHY: ICD-10-CM

## 2023-02-01 LAB — VIT B12 SERPL-MCNC: 178 PG/ML (ref 232–1245)

## 2023-02-01 PROCEDURE — 36415 COLL VENOUS BLD VENIPUNCTURE: CPT

## 2023-02-01 PROCEDURE — 86334 IMMUNOFIX E-PHORESIS SERUM: CPT | Mod: 26

## 2023-02-01 PROCEDURE — 82607 VITAMIN B-12: CPT

## 2023-02-01 PROCEDURE — 86334 IMMUNOFIX E-PHORESIS SERUM: CPT | Performed by: PATHOLOGY

## 2023-02-02 ENCOUNTER — HOSPITAL ENCOUNTER (OUTPATIENT)
Dept: CARDIAC REHAB | Facility: CLINIC | Age: 58
Discharge: HOME | End: 2023-02-02
Attending: INTERNAL MEDICINE
Payer: OTHER MISCELLANEOUS

## 2023-02-02 LAB — PROT PATTERN SERPL IFE-IMP: NORMAL

## 2023-02-02 PROCEDURE — 94625 PHY/QHP OP PULM RHB W/O MNTR: CPT

## 2023-02-07 ENCOUNTER — VIRTUAL VISIT (OUTPATIENT)
Dept: NEUROPSYCHOLOGY | Facility: CLINIC | Age: 58
End: 2023-02-07
Payer: COMMERCIAL

## 2023-02-07 DIAGNOSIS — R41.3 MEMORY LOSS: Primary | ICD-10-CM

## 2023-02-07 DIAGNOSIS — F41.9 ANXIETY: ICD-10-CM

## 2023-02-07 DIAGNOSIS — U07.1 COVID-19: ICD-10-CM

## 2023-02-07 PROCEDURE — 99207 PR PSYCHIATRIC DIAGNOSTIC EVALUATION: CPT | Mod: GT

## 2023-02-07 NOTE — PROGRESS NOTES
The patient was seen for neuropsychological evaluation via telehealth at the request of Clarisa Melara MD for the purposes of diagnostic clarification and treatment planning. 61 minutes of video test administration and scoring were provided by this writer. Please see Dr. Bharath Perkins's report for a full interpretation of the findings.      Yumiko Kirkland  Psychometrist

## 2023-02-08 ENCOUNTER — HOSPITAL ENCOUNTER (OUTPATIENT)
Dept: CARDIAC REHAB | Facility: CLINIC | Age: 58
Discharge: HOME | End: 2023-02-08
Attending: INTERNAL MEDICINE
Payer: OTHER MISCELLANEOUS

## 2023-02-08 ENCOUNTER — OFFICE VISIT (OUTPATIENT)
Dept: NEUROPSYCHOLOGY | Facility: CLINIC | Age: 58
End: 2023-02-08
Attending: INTERNAL MEDICINE
Payer: COMMERCIAL

## 2023-02-08 DIAGNOSIS — R41.3 MEMORY LOSS: Primary | ICD-10-CM

## 2023-02-08 DIAGNOSIS — U07.1 COVID-19: ICD-10-CM

## 2023-02-08 DIAGNOSIS — R41.840 ATTENTION DEFICIT: ICD-10-CM

## 2023-02-08 DIAGNOSIS — F41.9 ANXIETY: ICD-10-CM

## 2023-02-08 PROCEDURE — 96133 NRPSYC TST EVAL PHYS/QHP EA: CPT | Mod: CS | Performed by: PSYCHOLOGIST

## 2023-02-08 PROCEDURE — 96139 PSYCL/NRPSYC TST TECH EA: CPT | Mod: CS | Performed by: PSYCHOLOGIST

## 2023-02-08 PROCEDURE — 96138 PSYCL/NRPSYC TECH 1ST: CPT | Mod: CS | Performed by: PSYCHOLOGIST

## 2023-02-08 PROCEDURE — 96132 NRPSYC TST EVAL PHYS/QHP 1ST: CPT | Mod: CS | Performed by: PSYCHOLOGIST

## 2023-02-08 PROCEDURE — 90791 PSYCH DIAGNOSTIC EVALUATION: CPT | Mod: CS | Performed by: PSYCHOLOGIST

## 2023-02-08 PROCEDURE — G0239 OTH RESP PROC, GROUP: HCPCS
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
## 2023-02-08 NOTE — NURSING NOTE
Pt was seen for neuropsychological evaluation at the request of Clarisa Melara MD for the purposes of diagnostic clarification and treatment planning. 168 minutes of test administration and scoring were provided by this writer. Please see Dr. Bharath Perkins's report for a full interpretation of the findings.    Doretha Mcneill  Psychometrist

## 2023-02-08 NOTE — PROGRESS NOTES
NAME  Bharath Mercado    MRN  [id number]      [birth year]     AGE  57     SEX  Male     HANDEDNESS Right     EDUCATION 20+     MENDOSA  23     PROVIDER       [employer]  SW     STATION  OP            ORIENTATION      Time  -1     Personal Info.     Place      Presidents             TOMM       Trial 1  50     Trial 2  50     Trial 3  0            DCT       E-Score  7            WAIS-IV       Digit Span RDS 10            WRAT READING   4   SS  134     %ile  99     Grade Equivalence >12.9            WAIS-IV         Raw SSa    Digit Span  34 14    Similarities 32 14    Matrix Reasoning 23 16    Coding  58 10           TRAIL MAKING TEST       Time Errors SSa T   A 28 0 10 49   B 51 0 12 54           HVLT   1     Raw T    Trial 1  5     Trial 2  10     Trial 3  11     Learning  6     Total Recall 26 46    Delayed Recall 10 50    Percent Retention 91% 48    True Positives 12     False Positives 0     Disc. Index  12 58           ELISEO-O COMPLEX FIGURE       Raw T %ile   Time to Copy 180  >16   Copy  35  >16   Short Delay Recall 32 >80 >99   Long Delay Recall 31 79 >99   Recognition Total 23 65 93           WMS-IV LOGICAL MEMORY YA     Raw SSa/%ile    LM I  38 15    LM II  35 15    Recognition 28 >75            NAB NAMING  1   Raw 31 /31     z 0.33      T 52             COWAT FAS       Raw 44      SS 11      T 48             ANIMAL FLUENCY      Raw 26      SS 13      T 58              ELIAS   H   Raw 30      %ile 86             CLOCK DRAWING      Command  NML     Copy  NML            STROOP        Raw T     Word 85 30     Color 60 33     C/W 36 39     Intf. 2 52            MARK ANTHONY       Raw  7     Interpretation Minimal            BDI       Raw  2     Interpretation Minimal            ESS       Raw  14     Interpretation Moderate Excessive Daytime Sleepiness          JOBY              WCST (64 CARDS)        Raw T %ile   # Categories 6  >16   Total Correct 66     Total Errors 10 51 55   Perseverative Err. 7 49 45   % Concept  Resp. 64 51 55   FTMS:  0     LTL  0.15  >16          GROOVED PEGBOARD       Raw Drops SSa T   RH 64 0 10 53   LH 60 0 13 65

## 2023-02-14 ENCOUNTER — VIRTUAL VISIT (OUTPATIENT)
Dept: SLEEP MEDICINE | Facility: CLINIC | Age: 58
End: 2023-02-14
Payer: COMMERCIAL

## 2023-02-14 VITALS — BODY MASS INDEX: 29.4 KG/M2 | HEIGHT: 71 IN | WEIGHT: 210 LBS

## 2023-02-14 DIAGNOSIS — G47.33 OSA (OBSTRUCTIVE SLEEP APNEA): Primary | ICD-10-CM

## 2023-02-14 DIAGNOSIS — G47.59 OTHER PARASOMNIA: ICD-10-CM

## 2023-02-14 PROCEDURE — 99204 OFFICE O/P NEW MOD 45 MIN: CPT | Mod: VID | Performed by: PHYSICIAN ASSISTANT

## 2023-02-14 ASSESSMENT — SLEEP AND FATIGUE QUESTIONNAIRES
HOW LIKELY ARE YOU TO NOD OFF OR FALL ASLEEP WHILE SITTING AND READING: HIGH CHANCE OF DOZING
HOW LIKELY ARE YOU TO NOD OFF OR FALL ASLEEP WHEN YOU ARE A PASSENGER IN A CAR FOR AN HOUR WITHOUT A BREAK: HIGH CHANCE OF DOZING
HOW LIKELY ARE YOU TO NOD OFF OR FALL ASLEEP WHILE SITTING INACTIVE IN A PUBLIC PLACE: SLIGHT CHANCE OF DOZING
HOW LIKELY ARE YOU TO NOD OFF OR FALL ASLEEP WHILE SITTING AND TALKING TO SOMEONE: SLIGHT CHANCE OF DOZING
HOW LIKELY ARE YOU TO NOD OFF OR FALL ASLEEP WHILE WATCHING TV: MODERATE CHANCE OF DOZING
HOW LIKELY ARE YOU TO NOD OFF OR FALL ASLEEP IN A CAR, WHILE STOPPED FOR A FEW MINUTES IN TRAFFIC: SLIGHT CHANCE OF DOZING
HOW LIKELY ARE YOU TO NOD OFF OR FALL ASLEEP WHILE LYING DOWN TO REST IN THE AFTERNOON WHEN CIRCUMSTANCES PERMIT: HIGH CHANCE OF DOZING
HOW LIKELY ARE YOU TO NOD OFF OR FALL ASLEEP WHILE SITTING QUIETLY AFTER LUNCH WITHOUT ALCOHOL: MODERATE CHANCE OF DOZING

## 2023-02-14 ASSESSMENT — PAIN SCALES - GENERAL: PAINLEVEL: NO PAIN (0)

## 2023-02-14 NOTE — NURSING NOTE
Is the patient currently in the state of [place]? YES    Visit mode:VIDEO    If the visit is dropped, the patient can be reconnected by: VIDEO VISIT: Text to cell phone: [phone]    Will anyone else be joining the visit? NO      How would you like to obtain your AVS? MyChart    Are changes needed to the allergy or medication list? NO    Comments or concerns regarding today's visit: chriss Chung

## 2023-02-14 NOTE — PROGRESS NOTES
CPAP Follow-Up Visit:    Date on this visit: 2/14/2023    Bharath Mercado has a follow-up visit today to review his CPAP use for EFE.  He was initially seen at the [facility] for snoring and stopping breathing in his sleep for years. His medical history is significant for borderline hyperlipidemia and lumbago.       PSG 9/13/2016: Wt: 213# AHI was 20.2/hr, with desaturations down to 88%. He spent 0.7 minutes below 90% SpO2. RDI 24.5/hr. REM RDI 4.5/hr EFE (almost all REM was lateral). Supine RDI 37.5/hr. His non-supine RDI was 7/hr, AHI 4.7/hr (77 minutes, 36.5 minutes of REM). Periodic Limb Movement Index 23/hour, 6.6/hr were associated with arousals.   He is on auto CPAP 7-13 cm.     Sanjay was last seen in 2017. He presents because he would like to get his apnea treated.  He was not compliant with CPAP due to mask discomfort. He was using a nasal pillows mask and it was irritating his mustache. He would remove the mask in his sleep.    His machine is under recall and [employer] said they would send him a replacement machine, but he has not received one yet.   He has been struggling with some long-COVID cognitive deficits and he would like to minimize other contributors.   He tries to sleep on his sides mostly but does sometimes end up on his back. His wife does observe pauses in breathing. He says that is more on his back, sometimes on his sides. He has been getting a really dry mouth and his mouth is stuck shut in the morning.     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  His weight is about the same as at his sleep study (210# today).     PAP machine: [employer] (Dreamstation). Pressure settings: 7-13 cm    He has not used CPAP recently. He does spend some nights in the hospital and it is challenging to bring the CPAP when on call.    Weight change since sleep study: 210 lbs    Towards the end of the visit, he mentions he has been having some sexsomnia  behaviors. He was prescribed low dose clonazepam. He asks if I have any other thoughts.        Past medical/surgical history, family history, social history, medications and allergies were reviewed.      Problem List:  Patient Active Problem List    Diagnosis Date Noted     EFE (obstructive sleep apnea) 09/28/2016     Priority: Medium     Nonallopathic lesion of lumbar region 03/20/2013     Priority: Medium     Problem list name updated by automated process. 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 He has been diagnosed with moderate EFE with a significant supine predominance (supine AHI was 31/hr, non-supine 4.7/hr). He has struggled to tolerate CPAP. It bothered his mustache and he removed the mask in his sleep. He has had long-COVID symptoms and is interested in getting the apnea treated. He asks about alternatives to CPAP.  Plan: Comprehensive DME        We reviewed options for treating apnea including CPAP, mandibular advancement devices, positional restriction, weight loss, ENT surgery. He does not think he would tolerate a dental appliance due to strong gag reflex. He will make a mask fitting appointment with [facility], consider AirTouch N20. He was shown CPAP pillows to assist with lateral sleeping.     (G47.59) Other parasomnia  Comment: sexsomnia. Was started on clonazepam.  Plan: We discussed that this behaviors often co-occurs with apnea. Treating the apnea should help resolve the issue. If not, considering taking the escitalopram in the morning. Lastly, we discussed that NREM parasomnias are increased by anything that contributes to awakenings (apnea, caffeine, alcohol, external noises) or increased sleep drive making it hard to wake fully to a normal  alerting stimulus (apnea, sleep deprivation, sedatives, alcohol). We will re-assess at his follow up     He will follow up with me in about 2-3 month(s).     39 minutes were spent on the date of the encounter doing chart review, history and exam, documentation and further activities as noted above.     Bennett Goltz, PA-C    CC: No ref. provider found

## 2023-02-14 NOTE — PROGRESS NOTES
Video-Visit Details    Type of service:  Video Visit    Video Start Time (time video started): 3:34 PM     Video End Time (time video stopped): 4:06 PM    Originating Location (pt. Location): Home        Distant Location (provider location):  Off-site    Mode of Communication:  Video Conference via [facility]

## 2023-02-15 ENCOUNTER — VIRTUAL VISIT (OUTPATIENT)
Dept: PHYSICAL MEDICINE AND REHAB | Facility: CLINIC | Age: 58
End: 2023-02-15
Payer: OTHER MISCELLANEOUS

## 2023-02-15 DIAGNOSIS — G93.32 POST-COVID CHRONIC FATIGUE: Primary | ICD-10-CM

## 2023-02-15 DIAGNOSIS — U09.9 POST-COVID CHRONIC FATIGUE: Primary | ICD-10-CM

## 2023-02-15 DIAGNOSIS — E53.8 VITAMIN B12 DEFICIENCY (NON ANEMIC): ICD-10-CM

## 2023-02-15 PROCEDURE — 99215 OFFICE O/P EST HI 40 MIN: CPT | Mod: VID | Performed by: INTERNAL MEDICINE

## 2023-02-15 RX ORDER — CYANOCOBALAMIN 1000 UG/ML
1 INJECTION, SOLUTION INTRAMUSCULAR; SUBCUTANEOUS
Qty: 5 ML | Refills: 0 | OUTPATIENT
Start: 2023-02-15 | End: 2023-03-16

## 2023-02-15 SDOH — SOCIAL STABILITY: SOCIAL NETWORK: I HAVE TROUBLE DOING ALL OF MY REGULAR LEISURE ACTIVITIES WITH OTHERS: RARELY

## 2023-02-15 SDOH — SOCIAL STABILITY: SOCIAL NETWORK: I HAVE TROUBLE DOING ALL OF THE ACTIVITIES WITH FRIENDS THAT I WANT TO DO: SOMETIMES

## 2023-02-15 SDOH — SOCIAL STABILITY: SOCIAL NETWORK: PROMIS ABILITY TO PARTICIPATE IN SOCIAL ROLES & ACTIVITIES T-SCORE: 49.8

## 2023-02-15 SDOH — SOCIAL STABILITY: SOCIAL NETWORK: I HAVE TROUBLE DOING ALL OF THE FAMILY ACTIVITIES THAT I WANT TO DO: RARELY

## 2023-02-15 SDOH — SOCIAL STABILITY: SOCIAL NETWORK

## 2023-02-15 SDOH — SOCIAL STABILITY: SOCIAL NETWORK: I HAVE TROUBLE DOING ALL OF MY USUAL WORK (INCLUDE WORK AT HOME): RARELY

## 2023-02-15 ASSESSMENT — ENCOUNTER SYMPTOMS
NUMBNESS: 0
WEIGHT GAIN: 0
HEARTBURN: 0
CHILLS: 0
BLOOD IN STOOL: 0
WEIGHT LOSS: 0
CONSTIPATION: 0
MEMORY LOSS: 0
DIZZINESS: 0
POLYDIPSIA: 0
RECTAL PAIN: 0
INCREASED ENERGY: 0
NERVOUS/ANXIOUS: 1
NECK PAIN: 0
TINGLING: 0
SMELL DISTURBANCE: 0
COUGH DISTURBING SLEEP: 0
NECK MASS: 0
MUSCLE CRAMPS: 0
POOR WOUND HEALING: 0
MYALGIAS: 0
JAUNDICE: 0
ARTHRALGIAS: 1
NIGHT SWEATS: 1
BOWEL INCONTINENCE: 0
SPEECH CHANGE: 0
SKIN CHANGES: 0
PANIC: 1
NAIL CHANGES: 0
TROUBLE SWALLOWING: 0
PARALYSIS: 0
INSOMNIA: 0
TREMORS: 0
WEAKNESS: 1
ALTERED TEMPERATURE REGULATION: 0
WHEEZING: 0
HEMOPTYSIS: 0
JOINT SWELLING: 0
DECREASED CONCENTRATION: 1
STIFFNESS: 0
SNORES LOUDLY: 1
SINUS CONGESTION: 0
DEPRESSION: 0
SORE THROAT: 0
DYSPNEA ON EXERTION: 1
BACK PAIN: 0
MUSCLE WEAKNESS: 1
POSTURAL DYSPNEA: 0
HOARSE VOICE: 0
HALLUCINATIONS: 0
LOSS OF CONSCIOUSNESS: 0
SINUS PAIN: 0
SEIZURES: 0
DIARRHEA: 1
VOMITING: 0
BLOATING: 0
DISTURBANCES IN COORDINATION: 0
DECREASED APPETITE: 0
FATIGUE: 1
SHORTNESS OF BREATH: 0
NAUSEA: 0
HEADACHES: 0
COUGH: 0
TASTE DISTURBANCE: 0
SPUTUM PRODUCTION: 0
FEVER: 0
ABDOMINAL PAIN: 0
POLYPHAGIA: 0

## 2023-02-15 ASSESSMENT — ANXIETY QUESTIONNAIRES
5. BEING SO RESTLESS THAT IT IS HARD TO SIT STILL: SEVERAL DAYS
3. WORRYING TOO MUCH ABOUT DIFFERENT THINGS: SEVERAL DAYS
6. BECOMING EASILY ANNOYED OR IRRITABLE: SEVERAL DAYS
GAD7 TOTAL SCORE: 7
7. FEELING AFRAID AS IF SOMETHING AWFUL MIGHT HAPPEN: NOT AT ALL
IF YOU CHECKED OFF ANY PROBLEMS ON THIS QUESTIONNAIRE, HOW DIFFICULT HAVE THESE PROBLEMS MADE IT FOR YOU TO DO YOUR WORK, TAKE CARE OF THINGS AT HOME, OR GET ALONG WITH OTHER PEOPLE: SOMEWHAT DIFFICULT
4. TROUBLE RELAXING: SEVERAL DAYS
1. FEELING NERVOUS, ANXIOUS, OR ON EDGE: MORE THAN HALF THE DAYS
GAD7 TOTAL SCORE: 7
GAD7 TOTAL SCORE: 7
7. FEELING AFRAID AS IF SOMETHING AWFUL MIGHT HAPPEN: NOT AT ALL
8. IF YOU CHECKED OFF ANY PROBLEMS, HOW DIFFICULT HAVE THESE MADE IT FOR YOU TO DO YOUR WORK, TAKE CARE OF THINGS AT HOME, OR GET ALONG WITH OTHER PEOPLE?: SOMEWHAT DIFFICULT
2. NOT BEING ABLE TO STOP OR CONTROL WORRYING: SEVERAL DAYS

## 2023-02-15 NOTE — LETTER
"2/15/2023       RE: Bharath Mercado  [address] [facility] [place] [address]     Dear Colleague,    Thank you for referring your patient, Bharath Mercado, to the [facility] PHYSICAL MEDICINE AND REHABILITATION CLINIC [place] at [facility]. Please see a copy of my visit note below.      Assessment & Plan     Post-COVID chronic fatigue  Reviewed management of fatigue with patient.  Advised to continue with implementation of energy conservation strategies.  Patient was also advised on impact of fatigue on cognitive performance.  Discussed speech therapy referral for help with cognitive function, patient declined at this time.  He was encouraged to contact the clinic if he decides to pursue this option.    Vitamin B12 deficiency (non anemic)  Reviewed test results with patient.  Advised on options for treatment of B12 deficiency.  Patient feels comfortable doing intramuscular injections at home.  Prescription for B12 injections was forwarded to patient's pharmacy.  He will continue with oral supplementation, but will do injections once a week for the next 5 weeks.  Recommend recheck of B12 level in about 2 to 3 months.  - cyanocobalamin (CYANOCOBALAMIN) 1000 MCG/ML injection; Inject 1 mL (1,000 mcg) into the muscle every 7 days      I spent a total of 40 minutes on the day of the visit.   Time spent doing chart review, history and exam, documentation and further activities per the note       BMI:   Estimated body mass index is 29.29 kg/m  as calculated from the following:    Height as of 2/14/23: 1.803 m (5' 11\").    Weight as of 2/14/23: 95.3 kg (210 lb).           Return in about 6 months (around 8/15/2023).    Clarisa Melara MD  [facility] PHYSICAL MEDICINE AND REHABILITATION CLINIC [place]    Tremaine Grace is a 57 year old, presenting for the following health issues:  Video Visit    Video Visit      HPI     Patient is following up on " post-COVID issues. He reports that some of the symptoms have improved. He states that his shortness of breath has improved. He still feels that his legs are tired with going upstairs. He also continues to struggle with some cognitive issues. He recently had sleep evaluation, has been advised on the need to refit his nose piece.         Current concerns: Job Concerns and Health Concerns    PHQ Assesment Total Score(s) 2/15/2023   PHQ-9 Score 3   Some recent data might be hidden     VIDA-7 Results 2/15/2023   VIDA 7 TOTAL SCORE 7 (mild anxiety)   Some recent data might be hidden     PTSD Screen Score 2/15/2023   Have you ever experienced this kind of event? Yes   PTSD Screen (Score of 3 or more suggests positive screen) 3   Some recent data might be hidden     PROMIS-29 2/15/2023   PROMIS Physical Function T-Score 43.4 (mild dysfunction)   PROMIS Anxiety T-Score 59.5 (mild)   PROMIS Depression T-Score 41 (within normal limits)   PROMIS Fatigue T-Score 55.1   PROMIS Sleep Disturbance T-Score 48.4 (within normal limits)   PROMIS Ability to Participate in Social Roles & Activities T-Score 49.8 (within normal limits)   PROMIS Pain Interference T-Score 52 (within normal limits)   PROMIS Pain Intensity 2           Past Medical History:   Diagnosis Date     Borderline hyperlipidemia      Hernia, abdominal     inguinal     Palpitations     intermittent PSVT       Past Surgical History:   Procedure Laterality Date     HC REPAIR ING HERNIA,5+Y/O,REDUCIBL      right inguinal     VASECTOMY       ZZC APPENDECTOMY         Family History   Problem Relation Age of Onset     Cerebrovascular Disease Mother      Cancer Father 68        bladder ca/lentigo maligna     C.A.D. Father 65        coronary stents     Cerebrovascular Disease Paternal Grandmother      Diabetes Paternal Grandmother         type 2     Diabetes Paternal Aunt         type 1, childhood onset     C.A.D. Paternal Aunt      Cancer - colorectal No family hx of      Prostate  Cancer No family hx of        Social History     Tobacco Use     Smoking status: Never     Smokeless tobacco: Never   Substance Use Topics     Alcohol use: Yes     Alcohol/week: 0.0 standard drinks     Comment: 2 x week     Drug use: No         Current Outpatient Medications:      clonazePAM (KLONOPIN) 0.5 MG tablet, Take 1 tablet (0.5 mg) by mouth nightly as needed for sleep, Disp: 30 tablet, Rfl: 2     escitalopram (LEXAPRO) 10 MG tablet, Take 1 tablet (10 mg) by mouth daily, Disp: 90 tablet, Rfl: 3     multivitamin, therapeutic with minerals (THERA-VIT-M) TABS, Take 1 tablet by mouth daily (Patient not taking: Reported on 2/15/2023), Disp: , Rfl:      sildenafil (REVATIO) 20 MG tablet, Take 1 tablet (20 mg) by mouth daily as needed (erectile dysfunction) (Patient not taking: Reported on 2/15/2023), Disp: 30 tablet, Rfl: 3    Review of Systems   Constitutional: Positive for fatigue. Negative for chills and fever.   HENT: Negative for ear discharge, ear pain, hearing loss, mouth sores, nosebleeds, sinus pain, sore throat, tinnitus and trouble swallowing.    Respiratory: Negative for cough, shortness of breath and wheezing.    Gastrointestinal: Positive for diarrhea. Negative for abdominal pain, constipation, heartburn, nausea, rectal pain and vomiting.   Endocrine: Negative for polydipsia and polyphagia.   Musculoskeletal: Positive for arthralgias. Negative for back pain, joint swelling, myalgias and neck pain.   Skin: Negative for rash.   Neurological: Positive for weakness. Negative for dizziness, tremors, seizures, numbness and headaches.   Psychiatric/Behavioral: Positive for decreased concentration. Negative for hallucinations. The patient is nervous/anxious.            Objective    Vitals - Patient Reported  Weight (Patient Reported): 95.3 kg (210 lb)  Pain Score: No Pain (0)        Physical Exam   GENERAL: Healthy, alert and no distress  EYES: Eyes grossly normal to inspection.  No discharge or erythema, or  obvious scleral/conjunctival abnormalities.  RESP: No audible wheeze, cough, or visible cyanosis.  No visible retractions or increased work of breathing.    SKIN: Visible skin clear. No significant rash, abnormal pigmentation or lesions.  NEURO: Cranial nerves grossly intact.  Mentation and speech appropriate for age.  PSYCH: Mentation appears normal, affect normal/bright, judgement and insight intact, normal speech and appearance well-groomed.          Sincerely,    Clarisa Melara MD

## 2023-02-15 NOTE — NURSING NOTE
Is the patient currently in the state of [place]? NO    Visit mode:VIDEO    If the visit is dropped, the patient can be reconnected by: VIDEO VISIT: Text to cell phone: [phone]    Will anyone else be joining the visit? NO      How would you like to obtain your AVS? MyChart    Are changes needed to the allergy or medication list? NO    Comments or concerns regarding today's visit: NONE

## 2023-02-15 NOTE — PROGRESS NOTES
"Sanjay is a 57 year old who is being evaluated via a billable video visit.      How would you like to obtain your AVS? MyChart  If the video visit is dropped, the invitation should be resent by: Text to cell phone: [phone]  Will anyone else be joining your video visit? No          Assessment & Plan     Post-COVID chronic fatigue  Reviewed management of fatigue with patient.  Advised to continue with implementation of energy conservation strategies.  Patient was also advised on impact of fatigue on cognitive performance.  Discussed speech therapy referral for help with cognitive function, patient declined at this time.  He was encouraged to contact the clinic if he decides to pursue this option.    Vitamin B12 deficiency (non anemic)  Reviewed test results with patient.  Advised on options for treatment of B12 deficiency.  Patient feels comfortable doing intramuscular injections at home.  Prescription for B12 injections was forwarded to patient's pharmacy.  He will continue with oral supplementation, but will do injections once a week for the next 5 weeks.  Recommend recheck of B12 level in about 2 to 3 months.  - cyanocobalamin (CYANOCOBALAMIN) 1000 MCG/ML injection; Inject 1 mL (1,000 mcg) into the muscle every 7 days      I spent a total of 40 minutes on the day of the visit.   Time spent doing chart review, history and exam, documentation and further activities per the note       BMI:   Estimated body mass index is 29.29 kg/m  as calculated from the following:    Height as of 2/14/23: 1.803 m (5' 11\").    Weight as of 2/14/23: 95.3 kg (210 lb).           Return in about 6 months (around 8/15/2023).    Clarisa Melara MD  [facility] PHYSICAL MEDICINE AND REHABILITATION CLINIC [place]    Tremaine Grace is a 57 year old, presenting for the following health issues:  Video Visit    Video Visit      HPI     Patient is following up on post-COVID issues. He reports that some of the symptoms have " improved. He states that his shortness of breath has improved. He still feels that his legs are tired with going upstairs. He also continues to struggle with some cognitive issues. He recently had sleep evaluation, has been advised on the need to refit his nose piece.         Current concerns: Job Concerns and Health Concerns    PHQ Assesment Total Score(s) 2/15/2023   PHQ-9 Score 3   Some recent data might be hidden     VIDA-7 Results 2/15/2023   VIDA 7 TOTAL SCORE 7 (mild anxiety)   Some recent data might be hidden     PTSD Screen Score 2/15/2023   Have you ever experienced this kind of event? Yes   PTSD Screen (Score of 3 or more suggests positive screen) 3   Some recent data might be hidden     PROMIS-29 2/15/2023   PROMIS Physical Function T-Score 43.4 (mild dysfunction)   PROMIS Anxiety T-Score 59.5 (mild)   PROMIS Depression T-Score 41 (within normal limits)   PROMIS Fatigue T-Score 55.1   PROMIS Sleep Disturbance T-Score 48.4 (within normal limits)   PROMIS Ability to Participate in Social Roles & Activities T-Score 49.8 (within normal limits)   PROMIS Pain Interference T-Score 52 (within normal limits)   PROMIS Pain Intensity 2           Past Medical History:   Diagnosis Date     Borderline hyperlipidemia      Hernia, abdominal     inguinal     Palpitations     intermittent PSVT       Past Surgical History:   Procedure Laterality Date     HC REPAIR ING HERNIA,5+Y/O,REDUCIBL      right inguinal     VASECTOMY       ZZC APPENDECTOMY         Family History   Problem Relation Age of Onset     Cerebrovascular Disease Mother      Cancer Father 68        bladder ca/lentigo maligna     C.A.D. Father 65        coronary stents     Cerebrovascular Disease Paternal Grandmother      Diabetes Paternal Grandmother         type 2     Diabetes Paternal Aunt         type 1, childhood onset     C.A.D. Paternal Aunt      Cancer - colorectal No family hx of      Prostate Cancer No family hx of        Social History     Tobacco Use      Smoking status: Never     Smokeless tobacco: Never   Substance Use Topics     Alcohol use: Yes     Alcohol/week: 0.0 standard drinks     Comment: 2 x week     Drug use: No         Current Outpatient Medications:      clonazePAM (KLONOPIN) 0.5 MG tablet, Take 1 tablet (0.5 mg) by mouth nightly as needed for sleep, Disp: 30 tablet, Rfl: 2     escitalopram (LEXAPRO) 10 MG tablet, Take 1 tablet (10 mg) by mouth daily, Disp: 90 tablet, Rfl: 3     multivitamin, therapeutic with minerals (THERA-VIT-M) TABS, Take 1 tablet by mouth daily (Patient not taking: Reported on 2/15/2023), Disp: , Rfl:      sildenafil (REVATIO) 20 MG tablet, Take 1 tablet (20 mg) by mouth daily as needed (erectile dysfunction) (Patient not taking: Reported on 2/15/2023), Disp: 30 tablet, Rfl: 3    Review of Systems   Constitutional: Positive for fatigue. Negative for chills and fever.   HENT: Negative for ear discharge, ear pain, hearing loss, mouth sores, nosebleeds, sinus pain, sore throat, tinnitus and trouble swallowing.    Respiratory: Negative for cough, shortness of breath and wheezing.    Gastrointestinal: Positive for diarrhea. Negative for abdominal pain, constipation, heartburn, nausea, rectal pain and vomiting.   Endocrine: Negative for polydipsia and polyphagia.   Musculoskeletal: Positive for arthralgias. Negative for back pain, joint swelling, myalgias and neck pain.   Skin: Negative for rash.   Neurological: Positive for weakness. Negative for dizziness, tremors, seizures, numbness and headaches.   Psychiatric/Behavioral: Positive for decreased concentration. Negative for hallucinations. The patient is nervous/anxious.             Objective    Vitals - Patient Reported  Weight (Patient Reported): 95.3 kg (210 lb)  Pain Score: No Pain (0)        Physical Exam   GENERAL: Healthy, alert and no distress  EYES: Eyes grossly normal to inspection.  No discharge or erythema, or obvious scleral/conjunctival abnormalities.  RESP: No  audible wheeze, cough, or visible cyanosis.  No visible retractions or increased work of breathing.    SKIN: Visible skin clear. No significant rash, abnormal pigmentation or lesions.  NEURO: Cranial nerves grossly intact.  Mentation and speech appropriate for age.  PSYCH: Mentation appears normal, affect normal/bright, judgement and insight intact, normal speech and appearance well-groomed.                Video-Visit Details    Type of service:  Video Visit     Originating Location (pt. Location): Home  Distant Location (provider location):  Off-site  Platform used for Video Visit: [employer]

## 2023-02-16 ENCOUNTER — TELEPHONE (OUTPATIENT)
Dept: TRANSPLANT | Facility: CLINIC | Age: 58
End: 2023-02-16
Payer: COMMERCIAL

## 2023-02-16 RX ORDER — NEEDLES, SAFETY 22GX1 1/2"
NEEDLE, DISPOSABLE MISCELLANEOUS
Qty: 5 EACH | Refills: 0 | Status: SHIPPED | OUTPATIENT
Start: 2023-02-16 | End: 2023-07-05

## 2023-02-16 NOTE — TELEPHONE ENCOUNTER
Pharmacy comment: Alternative Requested:CAN WE USE THESE? WE HAVE 25/27 GUAGE AND SHORTEST AVAILABLE IS 5/8 IN.

## 2023-03-06 ENCOUNTER — HOSPITAL ENCOUNTER (OUTPATIENT)
Dept: CARDIAC REHAB | Facility: CLINIC | Age: 58
Discharge: HOME | End: 2023-03-06
Attending: INTERNAL MEDICINE
Payer: OTHER MISCELLANEOUS

## 2023-03-06 PROCEDURE — 94625 PHY/QHP OP PULM RHB W/O MNTR: CPT

## 2023-03-07 ENCOUNTER — OFFICE VISIT (OUTPATIENT)
Dept: NEUROLOGY | Facility: CLINIC | Age: 58
End: 2023-03-07
Payer: COMMERCIAL

## 2023-03-07 DIAGNOSIS — E53.8 VITAMIN B12 DEFICIENCY NEUROPATHY (H): ICD-10-CM

## 2023-03-07 DIAGNOSIS — E53.8 VITAMIN B12 DEFICIENCY (NON ANEMIC): Primary | ICD-10-CM

## 2023-03-07 DIAGNOSIS — G63 VITAMIN B12 DEFICIENCY NEUROPATHY (H): ICD-10-CM

## 2023-03-07 PROCEDURE — 99213 OFFICE O/P EST LOW 20 MIN: CPT | Mod: GC

## 2023-03-07 NOTE — PATIENT INSTRUCTIONS
- Could consider yoga classes to help your hip flexors  - B12 goal over 400  - Get repeat B12 check on Thursday 03/23  - Would recommend monthly injections if over goal to maintain normal levels.  If not at or over goal, will need a few more B12 injections  - Contact us through [employer] if worsening

## 2023-03-07 NOTE — LETTER
3/7/2023         RE: Bharath Mercado  [address] [facility] [place] [address]        Dear Colleague,    Thank you for referring your patient, Bharath Mercado, to the [facility]. Please see a copy of my visit note below.      Section of General Neurology  Return Patient Visit    Bharath Mercado MRN# [id number]   Age: 57 year old YOB: 1965     Brief history of symptoms: The patient was initially seen in neurologic consultation on July 26, 2022 for evaluation of bilateral hip flexor weakness. Please see the comprehensive neurologic consultation note from that date in the Epic records for details.          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  EMG was negative for any obvious etiology regarding his weakness, however found a incidental length-dependent polyneuropathy likely secondary to vitamin B-12 deficiency.  Patient has been replacing with injections.  Will receive a total of 4 injections with a repeat to occur on 23 March.  Goal will be over 400.  In regards to patient's initial chief complaint, he still has some mild weakness in his hip flexors bilaterally, rated 4+ out of 5.  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  Encouraged frequent exercise as able as well as physical therapy and yoga to aid in his recovery.  Patient has also noted mild cognitive dysfunction since his COVID diagnosis, and neuropsych testing recently revealed some mild dysfunction, but nothing overly concerning.  Recommend to continue to see long-haul COVID clinic and no additional formal neurology follow-up needed at this time     Plan:  -Recheck vitamin B12 levels on March 23 (1 week after her last injection)  -Goal vitamin B12 level in this patient should be above  400  -If below goal would recommend 1-2 more injections, if above goal encouraged monthly injections to maintain normal levels  -Recommend practicing yoga as a possible method of muscle strengthening  -No formal follow-up visit needed at this time, recommended that patient reach out via MyChart if begins to worsen or has any further complaints         KM Rivas D.O.  Resident Physician of Neurology  [facility]    Patient discussed with my supervising physician Dr. Simpson, who agrees with the critical findings, assessment, and plan as documented in the note above or as otherwise in their attestation.       Interval history:   Patient doing well.  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  Recent vitamin B12 check revealed low levels at 178.  He has been replacing with injections that we ordered for him.  He has 2 more to go and then a vitamin B12 recheck to be done on 23 March.  He states that will be easy to get this done as he will be able to just do it while he is at work.  Patient participating with respiratory therapy.  Patient trying to walk as able and exercise to strengthen his muscles, states that he is almost back to normal but still notices his proximal lower extremity muscle weakness at times.  He recently went and had neuropsych testing done which revealed some minor deficits but nothing overly concerning.        Past Medical History:     Patient Active Problem List   Diagnosis     Esophageal reflux     CARDIOVASCULAR SCREENING; LDL GOAL LESS THAN 160     Nonallopathic lesion of lumbar region     EFE (obstructive sleep apnea)     Past Medical History:   Diagnosis Date     Borderline hyperlipidemia      Hernia, abdominal     inguinal     Palpitations     intermittent PSVT        Past Surgical History:     Past Surgical History:   Procedure Laterality Date     HC REPAIR ING HERNIA,5+Y/O,REDUCIBL      right inguinal     VASECTOMY       ZZC APPENDECTOMY    "       Social History:     Social History     Tobacco Use     Smoking status: Never     Smokeless tobacco: Never   Substance Use Topics     Alcohol use: Yes     Alcohol/week: 0.0 standard drinks     Comment: 2 x week     Drug use: No        Family History:     Family History   Problem Relation Age of Onset     Cerebrovascular Disease Mother      Cancer Father 68        bladder ca/lentigo maligna     C.A.D. Father 65        coronary stents     Cerebrovascular Disease Paternal Grandmother      Diabetes Paternal Grandmother         type 2     Diabetes Paternal Aunt         type 1, childhood onset     C.A.D. Paternal Aunt      Cancer - colorectal No family hx of      Prostate Cancer No family hx of         Medications:     Current Outpatient Medications   Medication Sig     BD SAFETYGLIDE SYRINGE/NEEDLE 27G X 5/8\" 1 ML MISC 1 EACH EVERY 7 DAYS     clonazePAM (KLONOPIN) 0.5 MG tablet Take 1 tablet (0.5 mg) by mouth nightly as needed for sleep     cyanocobalamin (CYANOCOBALAMIN) 1000 MCG/ML injection Inject 1 mL (1,000 mcg) into the muscle every 7 days     escitalopram (LEXAPRO) 10 MG tablet Take 1 tablet (10 mg) by mouth daily     multivitamin, therapeutic with minerals (THERA-VIT-M) TABS Take 1 tablet by mouth daily (Patient not taking: Reported on 2/15/2023)     sildenafil (REVATIO) 20 MG tablet Take 1 tablet (20 mg) by mouth daily as needed (erectile dysfunction) (Patient not taking: Reported on 2/15/2023)     No current facility-administered medications for this visit.        Allergies:     Allergies   Allergen Reactions     No Known Allergies           Physical Exam:   Vitals: There were no vitals taken for this visit.   Lungs: breathing comfortably  Extremities: no edema    Neuro:   General Appearance: No apparent distress, well-nourished, well-groomed, pleasant     Motor Exam:     Lower Extremities  Hip Flexors  Knee Extensors  Knee   Flexors  Dorsi Flexion  Plantar   Flexion    Right  4+ 5  5  5  5    Left  4+ 5  " 5  5  5      No abnormal movements noted.     Reflexes:  patellar 2+ and symmetric.    Gait: Deferred         Data: Pertinent prior to visit   Imaging:  Reviewed    Procedures:  Reviewed    Laboratory:  Vitamin B-12 -178    Attestation signed by Gayathri Simpson MD at 3/13/2023  9:38 AM:   I supervised the entire appointment by Dr Rivas.  Dr Mercado has a b12 deficiency currently being treated with IM injections. This would explain his neuropathy. The treatment ensures the neuropathy does not get worse but may not ensure complete resolution. Goal B12 level is over 400.     There maybe a component of post COVID in his fatigue and cognitive fogging history but it is improving over time and with anxiety treatment.     No follow up needed in neurology unless neuropathy symptoms get worse. B12 supplementation of some form expected to be needed life long.       Gayathri Simpson MD      Again, thank you for allowing me to participate in the care of your patient.        Sincerely,        Navjot Rivas MD

## 2023-03-07 NOTE — PROGRESS NOTES
Section of General Neurology  Return Patient Visit    Bharath Mercado MRN# [id number]   Age: 57 year old YOB: 1965     Brief history of symptoms: The patient was initially seen in neurologic consultation on July 26, 2022 for evaluation of bilateral hip flexor weakness. Please see the comprehensive neurologic consultation note from that date in the Epic records for details.          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  EMG was negative for any obvious etiology regarding his weakness, however found a incidental length-dependent polyneuropathy likely secondary to vitamin B-12 deficiency.  Patient has been replacing with injections.  Will receive a total of 4 injections with a repeat to occur on 23 March.  Goal will be over 400.  In regards to patient's initial chief complaint, he still has some mild weakness in his hip flexors bilaterally, rated 4+ out of 5.  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  Encouraged frequent exercise as able as well as physical therapy and yoga to aid in his recovery.  Patient has also noted mild cognitive dysfunction since his COVID diagnosis, and neuropsych testing recently revealed some mild dysfunction, but nothing overly concerning.  Recommend to continue to see long-haul COVID clinic and no additional formal neurology follow-up needed at this time     Plan:  -Recheck vitamin B12 levels on March 23 (1 week after her last injection)  -Goal vitamin B12 level in this patient should be above 400  -If below goal would recommend 1-2 more injections, if above goal encouraged monthly injections to maintain normal levels  -Recommend practicing yoga as a possible method of muscle strengthening  -No formal follow-up visit needed at this time, recommended that  patient reach out via MyChart if begins to worsen or has any further complaints         KM Rivas D.O.  Resident Physician of Neurology  [facility]    Patient discussed with my supervising physician Dr. Simpson, who agrees with the critical findings, assessment, and plan as documented in the note above or as otherwise in their attestation.       Interval history:   Patient doing well.  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  Recent vitamin B12 check revealed low levels at 178.  He has been replacing with injections that we ordered for him.  He has 2 more to go and then a vitamin B12 recheck to be done on 23 March.  He states that will be easy to get this done as he will be able to just do it while he is at work.  Patient participating with respiratory therapy.  Patient trying to walk as able and exercise to strengthen his muscles, states that he is almost back to normal but still notices his proximal lower extremity muscle weakness at times.  He recently went and had neuropsych testing done which revealed some minor deficits but nothing overly concerning.        Past Medical History:     Patient Active Problem List   Diagnosis     Esophageal reflux     CARDIOVASCULAR SCREENING; LDL GOAL LESS THAN 160     Nonallopathic lesion of lumbar region     EFE (obstructive sleep apnea)     Past Medical History:   Diagnosis Date     Borderline hyperlipidemia      Hernia, abdominal     inguinal     Palpitations     intermittent PSVT        Past Surgical History:     Past Surgical History:   Procedure Laterality Date     HC REPAIR ING HERNIA,5+Y/O,REDUCIBL      right inguinal     VASECTOMY       ZZC APPENDECTOMY          Social History:     Social History     Tobacco Use     Smoking status: Never     Smokeless tobacco: Never   Substance Use Topics     Alcohol use: Yes     Alcohol/week: 0.0 standard drinks     Comment: 2 x week     Drug use: No        Family History:     Family  "History   Problem Relation Age of Onset     Cerebrovascular Disease Mother      Cancer Father 68        bladder ca/lentigo maligna     C.A.D. Father 65        coronary stents     Cerebrovascular Disease Paternal Grandmother      Diabetes Paternal Grandmother         type 2     Diabetes Paternal Aunt         type 1, childhood onset     C.A.D. Paternal Aunt      Cancer - colorectal No family hx of      Prostate Cancer No family hx of         Medications:     Current Outpatient Medications   Medication Sig     BD SAFETYGLIDE SYRINGE/NEEDLE 27G X 5/8\" 1 ML MISC 1 EACH EVERY 7 DAYS     clonazePAM (KLONOPIN) 0.5 MG tablet Take 1 tablet (0.5 mg) by mouth nightly as needed for sleep     cyanocobalamin (CYANOCOBALAMIN) 1000 MCG/ML injection Inject 1 mL (1,000 mcg) into the muscle every 7 days     escitalopram (LEXAPRO) 10 MG tablet Take 1 tablet (10 mg) by mouth daily     multivitamin, therapeutic with minerals (THERA-VIT-M) TABS Take 1 tablet by mouth daily (Patient not taking: Reported on 2/15/2023)     sildenafil (REVATIO) 20 MG tablet Take 1 tablet (20 mg) by mouth daily as needed (erectile dysfunction) (Patient not taking: Reported on 2/15/2023)     No current facility-administered medications for this visit.        Allergies:     Allergies   Allergen Reactions     No Known Allergies           Physical Exam:   Vitals: There were no vitals taken for this visit.   Lungs: breathing comfortably  Extremities: no edema    Neuro:   General Appearance: No apparent distress, well-nourished, well-groomed, pleasant     Motor Exam:     Lower Extremities  Hip Flexors  Knee Extensors  Knee   Flexors  Dorsi Flexion  Plantar   Flexion    Right  4+ 5  5  5  5    Left  4+ 5  5  5  5      No abnormal movements noted.     Reflexes:  patellar 2+ and symmetric.    Gait: Deferred         Data: Pertinent prior to visit   Imaging:  Reviewed    Procedures:  Reviewed    Laboratory:  Vitamin B-12 -178  "

## 2023-03-08 ENCOUNTER — HOSPITAL ENCOUNTER (OUTPATIENT)
Dept: CARDIAC REHAB | Facility: CLINIC | Age: 58
Discharge: HOME | End: 2023-03-08
Attending: INTERNAL MEDICINE
Payer: OTHER MISCELLANEOUS

## 2023-03-08 PROCEDURE — 94626 PHY/QHP OP PULM RHB W/MNTR: CPT

## 2023-03-13 PROBLEM — G63 VITAMIN B12 DEFICIENCY NEUROPATHY (H): Status: ACTIVE | Noted: 2023-03-13

## 2023-03-13 PROBLEM — E53.8 VITAMIN B12 DEFICIENCY (NON ANEMIC): Status: ACTIVE | Noted: 2023-03-13

## 2023-03-13 PROBLEM — E53.8 VITAMIN B12 DEFICIENCY NEUROPATHY (H): Status: ACTIVE | Noted: 2023-03-13

## 2023-03-15 DIAGNOSIS — E53.8 VITAMIN B12 DEFICIENCY (NON ANEMIC): ICD-10-CM

## 2023-03-16 RX ORDER — CYANOCOBALAMIN 1000 UG/ML
INJECTION, SOLUTION INTRAMUSCULAR; SUBCUTANEOUS
Qty: 4 ML | Refills: 2 | Status: SHIPPED | OUTPATIENT
Start: 2023-03-16 | End: 2024-07-31 | Stop reason: DRUGHIGH

## 2023-03-23 ENCOUNTER — LAB (OUTPATIENT)
Dept: LAB | Facility: CLINIC | Age: 58
End: 2023-03-23
Payer: COMMERCIAL

## 2023-03-23 DIAGNOSIS — E53.8 VITAMIN B12 DEFICIENCY (NON ANEMIC): ICD-10-CM

## 2023-03-23 LAB — VIT B12 SERPL-MCNC: 624 PG/ML (ref 232–1245)

## 2023-03-23 PROCEDURE — 36415 COLL VENOUS BLD VENIPUNCTURE: CPT

## 2023-03-23 PROCEDURE — 82607 VITAMIN B-12: CPT

## 2023-04-12 DIAGNOSIS — E53.8 VITAMIN B12 DEFICIENCY (NON ANEMIC): ICD-10-CM

## 2023-06-12 NOTE — TELEPHONE ENCOUNTER
I called to review.  Has intermittent ongoing numbness in the face, much more mild.  Had one episode of left arm numbness at the time of left cheek numbness.  Neurology appt at the [facility] is not available until October.  Suggest trying [facility] for additional evaluation.    Home Suture Removal Text: Patient was provided instructions on removing sutures and will remove their sutures at home.  If they have any questions or difficulties they will call the office.

## 2023-07-03 ASSESSMENT — ENCOUNTER SYMPTOMS
POSTURAL DYSPNEA: 0
ARTHRALGIAS: 1
COUGH DISTURBING SLEEP: 0
SPUTUM PRODUCTION: 0
JOINT SWELLING: 0
NAIL CHANGES: 0
INSOMNIA: 0
DYSPNEA ON EXERTION: 0
DECREASED CONCENTRATION: 1
INCREASED ENERGY: 0
SKIN CHANGES: 0
NECK PAIN: 0
COUGH: 0
STIFFNESS: 0
NIGHT SWEATS: 0
FEVER: 0
MYALGIAS: 0
MUSCLE WEAKNESS: 0
HALLUCINATIONS: 0
POLYDIPSIA: 0
BACK PAIN: 0
NERVOUS/ANXIOUS: 1
CHILLS: 0
POLYPHAGIA: 0
POOR WOUND HEALING: 0
WEIGHT LOSS: 0
HEMOPTYSIS: 0
SNORES LOUDLY: 1
MUSCLE CRAMPS: 1
WEIGHT GAIN: 0
DECREASED APPETITE: 0
ALTERED TEMPERATURE REGULATION: 0
FATIGUE: 1
PANIC: 1
DEPRESSION: 0
WHEEZING: 0
SHORTNESS OF BREATH: 0

## 2023-07-03 ASSESSMENT — ANXIETY QUESTIONNAIRES
7. FEELING AFRAID AS IF SOMETHING AWFUL MIGHT HAPPEN: NOT AT ALL
1. FEELING NERVOUS, ANXIOUS, OR ON EDGE: SEVERAL DAYS
4. TROUBLE RELAXING: SEVERAL DAYS
5. BEING SO RESTLESS THAT IT IS HARD TO SIT STILL: SEVERAL DAYS
IF YOU CHECKED OFF ANY PROBLEMS ON THIS QUESTIONNAIRE, HOW DIFFICULT HAVE THESE PROBLEMS MADE IT FOR YOU TO DO YOUR WORK, TAKE CARE OF THINGS AT HOME, OR GET ALONG WITH OTHER PEOPLE: SOMEWHAT DIFFICULT
2. NOT BEING ABLE TO STOP OR CONTROL WORRYING: SEVERAL DAYS
GAD7 TOTAL SCORE: 5
GAD7 TOTAL SCORE: 5
6. BECOMING EASILY ANNOYED OR IRRITABLE: NOT AT ALL
3. WORRYING TOO MUCH ABOUT DIFFERENT THINGS: SEVERAL DAYS

## 2023-07-03 ASSESSMENT — PATIENT HEALTH QUESTIONNAIRE - PHQ9
SUM OF ALL RESPONSES TO PHQ QUESTIONS 1-9: 2
SUM OF ALL RESPONSES TO PHQ QUESTIONS 1-9: 2
10. IF YOU CHECKED OFF ANY PROBLEMS, HOW DIFFICULT HAVE THESE PROBLEMS MADE IT FOR YOU TO DO YOUR WORK, TAKE CARE OF THINGS AT HOME, OR GET ALONG WITH OTHER PEOPLE: SOMEWHAT DIFFICULT

## 2023-07-05 ENCOUNTER — VIRTUAL VISIT (OUTPATIENT)
Dept: PHYSICAL MEDICINE AND REHAB | Facility: CLINIC | Age: 58
End: 2023-07-05
Payer: OTHER MISCELLANEOUS

## 2023-07-05 DIAGNOSIS — E53.8 VITAMIN B12 DEFICIENCY (NON ANEMIC): Primary | ICD-10-CM

## 2023-07-05 DIAGNOSIS — U09.9 POST-COVID CHRONIC FATIGUE: ICD-10-CM

## 2023-07-05 DIAGNOSIS — G93.32 POST-COVID CHRONIC FATIGUE: ICD-10-CM

## 2023-07-05 PROCEDURE — 99214 OFFICE O/P EST MOD 30 MIN: CPT | Mod: VID | Performed by: INTERNAL MEDICINE

## 2023-07-05 RX ORDER — LANOLIN ALCOHOL/MO/W.PET/CERES
1000 CREAM (GRAM) TOPICAL DAILY
Qty: 90 TABLET | Refills: 3 | Status: SHIPPED | OUTPATIENT
Start: 2023-07-05

## 2023-07-05 RX ORDER — CYANOCOBALAMIN 1000 UG/ML
1 INJECTION, SOLUTION INTRAMUSCULAR; SUBCUTANEOUS
Qty: 10 ML | Refills: 1 | Status: SHIPPED | OUTPATIENT
Start: 2023-07-05

## 2023-07-05 ASSESSMENT — ENCOUNTER SYMPTOMS
DIARRHEA: 0
HYPOTENSION: 0
BLOOD IN STOOL: 0
WEAKNESS: 0
POLYPHAGIA: 0
PARALYSIS: 0
ORTHOPNEA: 0
EXERCISE INTOLERANCE: 0
FATIGUE: 1
MEMORY LOSS: 0
HEARTBURN: 0
TROUBLE SWALLOWING: 0
FEVER: 0
BLOATING: 0
SORE THROAT: 0
SYNCOPE: 0
MUSCLE WEAKNESS: 0
SEIZURES: 0
TREMORS: 0
POLYDIPSIA: 0
PALPITATIONS: 0
NECK MASS: 0
HYPERTENSION: 0
JAUNDICE: 0
NECK PAIN: 0
POSTURAL DYSPNEA: 0
SKIN CHANGES: 0
TASTE DISTURBANCE: 0
DECREASED APPETITE: 0
PANIC: 1
EYE REDNESS: 0
NAIL CHANGES: 0
DYSURIA: 0
BACK PAIN: 0
EYE IRRITATION: 0
RECTAL PAIN: 0
LEG SWELLING: 0
DOUBLE VISION: 0
HEADACHES: 0
SNORES LOUDLY: 1
DYSPNEA ON EXERTION: 0
FLANK PAIN: 0
COUGH DISTURBING SLEEP: 0
NERVOUS/ANXIOUS: 1
SPEECH CHANGE: 0
WEIGHT GAIN: 0
DISTURBANCES IN COORDINATION: 0
WEIGHT LOSS: 0
DEPRESSION: 0
BRUISES/BLEEDS EASILY: 0
CONSTIPATION: 0
EXTREMITY NUMBNESS: 0
EYE PAIN: 0
ALTERED TEMPERATURE REGULATION: 0
LIGHT-HEADEDNESS: 0
NUMBNESS: 0
BOWEL INCONTINENCE: 0
POOR WOUND HEALING: 0
SINUS CONGESTION: 0
CLAUDICATION: 0
SINUS PAIN: 0
HEMOPTYSIS: 0
INSOMNIA: 0
SMELL DISTURBANCE: 0
SLEEP DISTURBANCES DUE TO BREATHING: 0
SPUTUM PRODUCTION: 0
CHILLS: 0
RECTAL BLEEDING: 0
VOMITING: 0
TACHYCARDIA: 0
ARTHRALGIAS: 1
JOINT SWELLING: 0
SHORTNESS OF BREATH: 0
DIFFICULTY URINATING: 0
COUGH: 0
NIGHT SWEATS: 0
HALLUCINATIONS: 0
LEG PAIN: 0
MYALGIAS: 0
MUSCLE CRAMPS: 1
WHEEZING: 0
TINGLING: 0
EYE WATERING: 0
HOARSE VOICE: 0
STIFFNESS: 0
LOSS OF CONSCIOUSNESS: 0
HEMATURIA: 0
DECREASED CONCENTRATION: 1
ABDOMINAL PAIN: 0
INCREASED ENERGY: 0
SWOLLEN GLANDS: 0
NAUSEA: 0
DIZZINESS: 0

## 2023-07-05 NOTE — LETTER
7/5/2023       RE: Bharath Mercado  [address] [facility] [address]       Dear Colleague,    Thank you for referring your patient, Bharath Mercado, to the [facility] PH[facility]ICINE [facility]ILIT[facility] [place] at [facility]. Please see a copy of my visit note below.    Sanjay is a 57 year old who is being evaluated via a billable video visit.      How would you like to obtain your AVS? MyChart  If the video visit is dropped, the invitation should be resent by: Text to cell phone: [phone]  Will anyone else be joining your video visit? No    Assessment & Plan     Vitamin B12 deficiency (non anemic)  Patient reports significant improvement in symptoms of exercise endurance and fatigue with appropriate treatment of B12 deficiency.  However, he has noticed that he feels better about 2 weeks after the injection.  He is wondering if injections can be done more currently.  Discussed timing of injections and goal vitamin B12 level which can be achieved with once monthly injections.  Recommend to continue with once monthly injection but adding oral supplementation.  Patient was advised that high-dose supplementation would be sufficient even with absorption issues.  Recommend monitoring of B12 levels over time.  - cyanocobalamin (VITAMIN B-12) 1000 MCG tablet; Take 1 tablet (1,000 mcg) by mouth daily  - cyanocobalamin (CYANOCOBALAMIN) 1000 MCG/ML injection; Inject 1 mL (1,000 mcg) into the muscle every 30 days    Post-COVID chronic fatigue  Patient has significant improvement in fatigue.  He continues to have some issues with anxiety and organization.  He is planning to discuss changes in medication with his primary care provider.  May also consider adding guanfacine with N-acetylcysteine at low-dose, no higher than 1 mg of guanfacine and 600 mg of N-acetylcysteine at bedtime as it been shown to improve some of the symptoms in post COVID  "patients.  However, given very limited experience with the medication and overall improvement in symptoms with appropriate management of anxiety, this would be not the first-line medication change.      Further follow-up with post-COVID clinic should be done as needed.        I spent a total of 30 minutes on the day of the visit.   Time spent by me doing chart review, history and exam, documentation and further activities per the note       BMI:   Estimated body mass index is 29.29 kg/m  as calculated from the following:    Height as of 2/14/23: 1.803 m (5' 11\").    Weight as of 2/14/23: 95.3 kg (210 lb).           No follow-ups on file.    Clarisa Melara MD  [facility] PHYSICAL MEDICINE AND REHABILITATION CLINIC M[place] Grace is a 57 year old, presenting for the following health issues:  RECHECK (6 month revisit)  RECHECK (6 month revisit)    HPI     Patient is following up on post-COVID symptoms. He reports that he has been feeling well. He states that his anxiety is under good control. He is getting B12 injections and feels that he is feeling better. He is noticing fluctuations in how he feels following injections within a couple of weeks. He is wondering if he can do the shots more than once a month. He has noticed increase in ability to exercise. He is training for a hike, was able to walk 8 miles recently. He still has some anxiety and issues with organization at work, however, feels that it is under better control.       Current concerns: Job Concerns and Health Concerns        7/3/2023     5:05 PM   PHQ Assesment Total Score(s)   PHQ-9 Score 2         7/3/2023     5:06 PM   VIDA-7 Results   VDIA 7 TOTAL SCORE 5 (mild anxiety)   VIDA-7 Total Score 5         7/3/2023     5:07 PM   PTSD Screen Score   Have you ever experienced this kind of event? No         7/5/2023     9:06 AM   PROMIS-29   PROMIS Physical Function T-Score 45 (within normal limits)   PROMIS Anxiety T-Score 60 " "(mild)   PROMIS Depression T-Score 49 (within normal limits)   PROMIS Fatigue T-Score 51 (within normal limits)   PROMIS Sleep Disturbance T-Score 48 (within normal limits)   PROMIS Ability to Participate in Social Roles & Activities T-Score 52 (within normal limits)   PROMIS Pain Interference T-Score 42 (within normal limits)   PROMIS Pain Intensity 2         Past Medical History:   Diagnosis Date    Borderline hyperlipidemia     Hernia, abdominal     inguinal    Palpitations     intermittent PSVT       Past Surgical History:   Procedure Laterality Date    HC REPAIR ING HERNIA,5+Y/O,REDUCIBL      right inguinal    VASECTOMY      ZZC APPENDECTOMY         Family History   Problem Relation Age of Onset    Cerebrovascular Disease Mother     Cancer Father 68        bladder ca/lentigo maligna    C.A.D. Father 65        coronary stents    Cerebrovascular Disease Paternal Grandmother     Diabetes Paternal Grandmother         type 2    Diabetes Paternal Aunt         type 1, childhood onset    C.A.D. Paternal Aunt     Cancer - colorectal No family hx of     Prostate Cancer No family hx of        Social History     Tobacco Use    Smoking status: Never    Smokeless tobacco: Never   Substance Use Topics    Alcohol use: Yes     Alcohol/week: 0.0 standard drinks of alcohol     Comment: 2 x week    Drug use: No         Current Outpatient Medications:     BD SAFETYGLIDE SYRINGE/NEEDLE 27G X 5/8\" 1 ML MISC, 1 EACH EVERY 7 DAYS, Disp: 5 each, Rfl: 0    clonazePAM (KLONOPIN) 0.5 MG tablet, Take 1 tablet (0.5 mg) by mouth nightly as needed for sleep, Disp: 30 tablet, Rfl: 2    cyanocobalamin (CYANOCOBALAMIN) 1000 MCG/ML injection, Inject 1 mL (1,000 mcg) into the muscle every 30 days, Disp: 10 mL, Rfl: 0    escitalopram (LEXAPRO) 10 MG tablet, Take 1 tablet (10 mg) by mouth daily, Disp: 90 tablet, Rfl: 3    cyanocobalamin (CYANOCOBALAMIN) 1000 MCG/ML injection, INJECT 1ML INTRAMUSCULARLY EVERY 7 DAYS, Disp: 4 mL, Rfl: 2    " multivitamin, therapeutic with minerals (THERA-VIT-M) TABS, Take 1 tablet by mouth daily (Patient not taking: Reported on 2/15/2023), Disp: , Rfl:     sildenafil (REVATIO) 20 MG tablet, Take 1 tablet (20 mg) by mouth daily as needed (erectile dysfunction) (Patient not taking: Reported on 2/15/2023), Disp: 30 tablet, Rfl: 3      Review of Systems   Constitutional: Positive for fatigue. Negative for chills, decreased appetite, fever, night sweats, weight gain and weight loss.   HENT: Negative for ear discharge, ear pain, hearing loss, hoarse voice, mouth sores, nosebleeds, sinus pain, sore throat, tinnitus and trouble swallowing.    Eyes: Negative for double vision, pain and redness.   Respiratory: Negative for cough, hemoptysis, sputum production, shortness of breath, wheezing and sleep disturbances due to breathing.    Cardiovascular: Negative for chest pain, dyspnea on exertion, palpitations, orthopnea, claudication and syncope.   Gastrointestinal: Negative for abdominal pain, bloating, blood in stool, bowel incontinence, constipation, diarrhea, heartburn, hemorrhoids, jaundice, melena, nausea, rectal pain and vomiting.   Endocrine: Negative for polydipsia and polyphagia.   Genitourinary: Negative for bladder incontinence, difficulty urinating, dysuria, flank pain, hematuria, nocturia, penile discharge and urgency.   Musculoskeletal: Positive for arthralgias and muscle cramps. Negative for back pain, joint swelling, myalgias, neck pain and stiffness.   Skin: Negative for itching, nail changes, poor wound healing and rash.   Neurological: Negative for dizziness, tingling, tremors, speech change, seizures, loss of consciousness, weakness, light-headedness, numbness, headaches and disturbances in coordination.   Hematological: Does not bruise/bleed easily.   Psychiatric/Behavioral: Positive for decreased concentration. Negative for depression, hallucinations and memory loss. The patient is nervous/anxious. The patient  does not have insomnia.       Review of Systems     Constitutional:  Positive for fatigue. Negative for fever, chills, weight loss, weight gain, decreased appetite, night sweats, recent stressors, height loss, post-operative complications, incisional pain, hallucinations, increased energy, hyperactivity and confused.   HENT:  Negative for ear pain, hearing loss, tinnitus, nosebleeds, trouble swallowing, hoarse voice, mouth sores, sore throat, ear discharge, tooth pain, gum tenderness, taste disturbance, smell disturbance, hearing aid, bleeding gums, dry mouth, sinus pain, sinus congestion and neck mass.    Eyes:  Negative for double vision, pain, redness, eye pain, decreased vision, eye watering, eye bulging, eye dryness, flashing lights, spots, floaters, strabismus, tunnel vision, jaundice and eye irritation.   Respiratory:   Positive for snores loudly. Negative for cough, hemoptysis, sputum production, shortness of breath, wheezing, sleep disturbances due to breathing, dyspnea on exertion, cough disturbing sleep and postural dyspnea.    Cardiovascular:  Negative for chest pain, dyspnea on exertion, palpitations, orthopnea, claudication, leg swelling, fingers/toes turn blue, hypertension, hypotension, syncope, history of heart murmur, chest pain on exertion, chest pain at rest, pacemaker, few scattered varicosities, leg pain, sleep disturbances due to breathing, tachycardia, light-headedness, exercise intolerance and edema.   Gastrointestinal:  Negative for heartburn, nausea, vomiting, abdominal pain, diarrhea, constipation, blood in stool, melena, rectal pain, bloating, hemorrhoids, bowel incontinence, jaundice, rectal bleeding, coffee ground emesis and change in stool.   Genitourinary:  Negative for bladder incontinence, dysuria, urgency, hematuria, flank pain, difficulty urinating, nocturia, voiding less frequently, scrotal pain, ulcerations, penile discharge, male genitourinary complaint and reduced libido.  "  Musculoskeletal:  Positive for arthralgias and muscle cramps. Negative for myalgias, back pain, joint swelling, stiffness, neck pain, bone pain, muscle weakness and fracture.   Skin:  Positive for warts and flaky skin. Negative for nail changes, itching, poor wound healing, rash, hair changes, skin changes, acne, poor wound healing, scarring, Raynaud's phenomenon, sensitivity to sunlight and skin thickening.   Neurological:  Negative for dizziness, tingling, tremors, speech change, seizures, loss of consciousness, weakness, light-headedness, numbness, headaches, disturbances in coordination, extremity numbness, memory loss, difficulty walking and paralysis.   Endo/Heme:  Negative for anemia, swollen glands and bruises/bleeds easily.   Psychiatric/Behavioral:  Positive for decreased concentration and panic attacks. Negative for depression, hallucinations, memory loss and mood swings.    Endocrine:  Negative for altered temperature regulation, polyphagia, polydipsia and change in facial hair.         Objective    Vitals - Patient Reported  Weight (Patient Reported): 93 kg (205 lb)  Height (Patient Reported): 180.3 cm (5' 11\")  BMI (Based on Pt Reported Ht/Wt): 28.59  Pain Score: No Pain (0)        Physical Exam  Musculoskeletal:         General: No edema.        GENERAL: Healthy, alert and no distress  EYES: Eyes grossly normal to inspection.  No discharge or erythema, or obvious scleral/conjunctival abnormalities.  RESP: No audible wheeze, cough, or visible cyanosis.  No visible retractions or increased work of breathing.    SKIN: Visible skin clear. No significant rash, abnormal pigmentation or lesions.  NEURO: Cranial nerves grossly intact.  Mentation and speech appropriate for age.  PSYCH: Mentation appears normal, affect normal/bright, judgement and insight intact, normal speech and appearance well-groomed.            Again, thank you for allowing me to participate in the care of your patient.  "     Sincerely,    Clarisa Melara MD

## 2023-07-05 NOTE — PROGRESS NOTES
Sanjay is a 57 year old who is being evaluated via a billable video visit.      How would you like to obtain your AVS? MyChart  If the video visit is dropped, the invitation should be resent by: Text to cell phone: [phone]  Will anyone else be joining your video visit? No    Assessment & Plan     Vitamin B12 deficiency (non anemic)  Patient reports significant improvement in symptoms of exercise endurance and fatigue with appropriate treatment of B12 deficiency.  However, he has noticed that he feels better about 2 weeks after the injection.  He is wondering if injections can be done more currently.  Discussed timing of injections and goal vitamin B12 level which can be achieved with once monthly injections.  Recommend to continue with once monthly injection but adding oral supplementation.  Patient was advised that high-dose supplementation would be sufficient even with absorption issues.  Recommend monitoring of B12 levels over time.  - cyanocobalamin (VITAMIN B-12) 1000 MCG tablet; Take 1 tablet (1,000 mcg) by mouth daily  - cyanocobalamin (CYANOCOBALAMIN) 1000 MCG/ML injection; Inject 1 mL (1,000 mcg) into the muscle every 30 days    Post-COVID chronic fatigue  Patient has significant improvement in fatigue.  He continues to have some issues with anxiety and organization.  He is planning to discuss changes in medication with his primary care provider.  May also consider adding guanfacine with N-acetylcysteine at low-dose, no higher than 1 mg of guanfacine and 600 mg of N-acetylcysteine at bedtime as it been shown to improve some of the symptoms in post COVID patients.  However, given very limited experience with the medication and overall improvement in symptoms with appropriate management of anxiety, this would be not the first-line medication change.      Further follow-up with post-COVID clinic should be done as needed.        I spent a total of 30 minutes on the day of the visit.   Time spent by me doing  "chart review, history and exam, documentation and further activities per the note       BMI:   Estimated body mass index is 29.29 kg/m  as calculated from the following:    Height as of 2/14/23: 1.803 m (5' 11\").    Weight as of 2/14/23: 95.3 kg (210 lb).           No follow-ups on file.    Clarisa Melara MD  [facility] PHYSICAL MEDICINE AND REHABILITATION CLINIC [place] Grace is a 57 year old, presenting for the following health issues:  RECHECK (6 month revisit)  RECHECK (6 month revisit)    HPI     Patient is following up on post-COVID symptoms. He reports that he has been feeling well. He states that his anxiety is under good control. He is getting B12 injections and feels that he is feeling better. He is noticing fluctuations in how he feels following injections within a couple of weeks. He is wondering if he can do the shots more than once a month. He has noticed increase in ability to exercise. He is training for a hike, was able to walk 8 miles recently. He still has some anxiety and issues with organization at work, however, feels that it is under better control.       Current concerns: Job Concerns and Health Concerns        7/3/2023     5:05 PM   PHQ Assesment Total Score(s)   PHQ-9 Score 2         7/3/2023     5:06 PM   VIDA-7 Results   VIDA 7 TOTAL SCORE 5 (mild anxiety)   VIDA-7 Total Score 5         7/3/2023     5:07 PM   PTSD Screen Score   Have you ever experienced this kind of event? No         7/5/2023     9:06 AM   PROMIS-29   PROMIS Physical Function T-Score 45 (within normal limits)   PROMIS Anxiety T-Score 60 (mild)   PROMIS Depression T-Score 49 (within normal limits)   PROMIS Fatigue T-Score 51 (within normal limits)   PROMIS Sleep Disturbance T-Score 48 (within normal limits)   PROMIS Ability to Participate in Social Roles & Activities T-Score 52 (within normal limits)   PROMIS Pain Interference T-Score 42 (within normal limits)   PROMIS Pain Intensity 2 " "        Past Medical History:   Diagnosis Date     Borderline hyperlipidemia      Hernia, abdominal     inguinal     Palpitations     intermittent PSVT       Past Surgical History:   Procedure Laterality Date     HC REPAIR ING HERNIA,5+Y/O,REDUCIBL      right inguinal     VASECTOMY       ZZC APPENDECTOMY         Family History   Problem Relation Age of Onset     Cerebrovascular Disease Mother      Cancer Father 68        bladder ca/lentigo maligna     C.A.D. Father 65        coronary stents     Cerebrovascular Disease Paternal Grandmother      Diabetes Paternal Grandmother         type 2     Diabetes Paternal Aunt         type 1, childhood onset     C.A.D. Paternal Aunt      Cancer - colorectal No family hx of      Prostate Cancer No family hx of        Social History     Tobacco Use     Smoking status: Never     Smokeless tobacco: Never   Substance Use Topics     Alcohol use: Yes     Alcohol/week: 0.0 standard drinks of alcohol     Comment: 2 x week     Drug use: No         Current Outpatient Medications:      BD SAFETYGLIDE SYRINGE/NEEDLE 27G X 5/8\" 1 ML MISC, 1 EACH EVERY 7 DAYS, Disp: 5 each, Rfl: 0     clonazePAM (KLONOPIN) 0.5 MG tablet, Take 1 tablet (0.5 mg) by mouth nightly as needed for sleep, Disp: 30 tablet, Rfl: 2     cyanocobalamin (CYANOCOBALAMIN) 1000 MCG/ML injection, Inject 1 mL (1,000 mcg) into the muscle every 30 days, Disp: 10 mL, Rfl: 0     escitalopram (LEXAPRO) 10 MG tablet, Take 1 tablet (10 mg) by mouth daily, Disp: 90 tablet, Rfl: 3     cyanocobalamin (CYANOCOBALAMIN) 1000 MCG/ML injection, INJECT 1ML INTRAMUSCULARLY EVERY 7 DAYS, Disp: 4 mL, Rfl: 2     multivitamin, therapeutic with minerals (THERA-VIT-M) TABS, Take 1 tablet by mouth daily (Patient not taking: Reported on 2/15/2023), Disp: , Rfl:      sildenafil (REVATIO) 20 MG tablet, Take 1 tablet (20 mg) by mouth daily as needed (erectile dysfunction) (Patient not taking: Reported on 2/15/2023), Disp: 30 tablet, Rfl: 3      Review of " Systems   Constitutional: Positive for fatigue. Negative for chills, decreased appetite, fever, night sweats, weight gain and weight loss.   HENT: Negative for ear discharge, ear pain, hearing loss, hoarse voice, mouth sores, nosebleeds, sinus pain, sore throat, tinnitus and trouble swallowing.    Eyes: Negative for double vision, pain and redness.   Respiratory: Negative for cough, hemoptysis, sputum production, shortness of breath, wheezing and sleep disturbances due to breathing.    Cardiovascular: Negative for chest pain, dyspnea on exertion, palpitations, orthopnea, claudication and syncope.   Gastrointestinal: Negative for abdominal pain, bloating, blood in stool, bowel incontinence, constipation, diarrhea, heartburn, hemorrhoids, jaundice, melena, nausea, rectal pain and vomiting.   Endocrine: Negative for polydipsia and polyphagia.   Genitourinary: Negative for bladder incontinence, difficulty urinating, dysuria, flank pain, hematuria, nocturia, penile discharge and urgency.   Musculoskeletal: Positive for arthralgias and muscle cramps. Negative for back pain, joint swelling, myalgias, neck pain and stiffness.   Skin: Negative for itching, nail changes, poor wound healing and rash.   Neurological: Negative for dizziness, tingling, tremors, speech change, seizures, loss of consciousness, weakness, light-headedness, numbness, headaches and disturbances in coordination.   Hematological: Does not bruise/bleed easily.   Psychiatric/Behavioral: Positive for decreased concentration. Negative for depression, hallucinations and memory loss. The patient is nervous/anxious. The patient does not have insomnia.       Review of Systems     Constitutional:  Positive for fatigue. Negative for fever, chills, weight loss, weight gain, decreased appetite, night sweats, recent stressors, height loss, post-operative complications, incisional pain, hallucinations, increased energy, hyperactivity and confused.   HENT:  Negative  for ear pain, hearing loss, tinnitus, nosebleeds, trouble swallowing, hoarse voice, mouth sores, sore throat, ear discharge, tooth pain, gum tenderness, taste disturbance, smell disturbance, hearing aid, bleeding gums, dry mouth, sinus pain, sinus congestion and neck mass.    Eyes:  Negative for double vision, pain, redness, eye pain, decreased vision, eye watering, eye bulging, eye dryness, flashing lights, spots, floaters, strabismus, tunnel vision, jaundice and eye irritation.   Respiratory:   Positive for snores loudly. Negative for cough, hemoptysis, sputum production, shortness of breath, wheezing, sleep disturbances due to breathing, dyspnea on exertion, cough disturbing sleep and postural dyspnea.    Cardiovascular:  Negative for chest pain, dyspnea on exertion, palpitations, orthopnea, claudication, leg swelling, fingers/toes turn blue, hypertension, hypotension, syncope, history of heart murmur, chest pain on exertion, chest pain at rest, pacemaker, few scattered varicosities, leg pain, sleep disturbances due to breathing, tachycardia, light-headedness, exercise intolerance and edema.   Gastrointestinal:  Negative for heartburn, nausea, vomiting, abdominal pain, diarrhea, constipation, blood in stool, melena, rectal pain, bloating, hemorrhoids, bowel incontinence, jaundice, rectal bleeding, coffee ground emesis and change in stool.   Genitourinary:  Negative for bladder incontinence, dysuria, urgency, hematuria, flank pain, difficulty urinating, nocturia, voiding less frequently, scrotal pain, ulcerations, penile discharge, male genitourinary complaint and reduced libido.   Musculoskeletal:  Positive for arthralgias and muscle cramps. Negative for myalgias, back pain, joint swelling, stiffness, neck pain, bone pain, muscle weakness and fracture.   Skin:  Positive for warts and flaky skin. Negative for nail changes, itching, poor wound healing, rash, hair changes, skin changes, acne, poor wound healing,  "scarring, Raynaud's phenomenon, sensitivity to sunlight and skin thickening.   Neurological:  Negative for dizziness, tingling, tremors, speech change, seizures, loss of consciousness, weakness, light-headedness, numbness, headaches, disturbances in coordination, extremity numbness, memory loss, difficulty walking and paralysis.   Endo/Heme:  Negative for anemia, swollen glands and bruises/bleeds easily.   Psychiatric/Behavioral:  Positive for decreased concentration and panic attacks. Negative for depression, hallucinations, memory loss and mood swings.    Endocrine:  Negative for altered temperature regulation, polyphagia, polydipsia and change in facial hair.          Objective    Vitals - Patient Reported  Weight (Patient Reported): 93 kg (205 lb)  Height (Patient Reported): 180.3 cm (5' 11\")  BMI (Based on Pt Reported Ht/Wt): 28.59  Pain Score: No Pain (0)        Physical Exam  Musculoskeletal:         General: No edema.        GENERAL: Healthy, alert and no distress  EYES: Eyes grossly normal to inspection.  No discharge or erythema, or obvious scleral/conjunctival abnormalities.  RESP: No audible wheeze, cough, or visible cyanosis.  No visible retractions or increased work of breathing.    SKIN: Visible skin clear. No significant rash, abnormal pigmentation or lesions.  NEURO: Cranial nerves grossly intact.  Mentation and speech appropriate for age.  PSYCH: Mentation appears normal, affect normal/bright, judgement and insight intact, normal speech and appearance well-groomed.                Video-Visit Details    Type of service:  Video Visit     Originating Location (pt. Location): Home  Distant Location (provider location):  Off-site  Platform used for Video Visit: Kalia"

## 2023-07-05 NOTE — NURSING NOTE
Is the patient currently in the state of [place]? YES    Visit mode:VIDEO    If the visit is dropped, the patient can be reconnected by: VIDEO VISIT: Send to e-mail at: [email or web address]    Will anyone else be joining the visit? NO      How would you like to obtain your AVS? MyChart    Are changes needed to the allergy or medication list? NO    Reason for visit: RECHECK (6 month revisit)

## 2023-07-11 RX ORDER — CYANOCOBALAMIN 1000 UG/ML
INJECTION, SOLUTION INTRAMUSCULAR; SUBCUTANEOUS
Qty: 4 ML | Refills: 2 | OUTPATIENT
Start: 2023-07-11

## 2023-07-18 ENCOUNTER — PATIENT OUTREACH (OUTPATIENT)
Dept: CARE COORDINATION | Facility: CLINIC | Age: 58
End: 2023-07-18
Payer: COMMERCIAL

## 2023-08-01 ENCOUNTER — PATIENT OUTREACH (OUTPATIENT)
Dept: CARE COORDINATION | Facility: CLINIC | Age: 58
End: 2023-08-01
Payer: COMMERCIAL

## 2023-09-02 DIAGNOSIS — F41.9 ANXIETY: ICD-10-CM

## 2023-09-06 RX ORDER — ESCITALOPRAM OXALATE 10 MG/1
10 TABLET ORAL DAILY
Qty: 90 TABLET | Refills: 0 | Status: SHIPPED | OUTPATIENT
Start: 2023-09-06 | End: 2023-12-04

## 2023-09-06 NOTE — TELEPHONE ENCOUNTER
Prescription approved per [facility] Refill Protocol.  Meliza Borrero RN, BSN  [facility]       No

## 2023-11-05 ENCOUNTER — HEALTH MAINTENANCE LETTER (OUTPATIENT)
Age: 58
End: 2023-11-05

## 2023-12-01 DIAGNOSIS — F41.9 ANXIETY: ICD-10-CM

## 2023-12-04 RX ORDER — ESCITALOPRAM OXALATE 10 MG/1
10 TABLET ORAL DAILY
Qty: 90 TABLET | Refills: 1 | Status: SHIPPED | OUTPATIENT
Start: 2023-12-04 | End: 2024-05-29

## 2024-02-07 ENCOUNTER — TRANSFERRED RECORDS (OUTPATIENT)
Dept: HEALTH INFORMATION MANAGEMENT | Facility: CLINIC | Age: 59
End: 2024-02-07
Payer: COMMERCIAL

## 2024-02-15 DIAGNOSIS — G47.33 OBSTRUCTIVE SLEEP APNEA (ADULT) (PEDIATRIC): Primary | ICD-10-CM

## 2024-02-16 NOTE — PROGRESS NOTES
CPAP DME order signed.  Follow-up within next year. [employer] message sent.  Bennett Goltz, PA-C

## 2024-02-21 ENCOUNTER — DOCUMENTATION ONLY (OUTPATIENT)
Dept: HOME HEALTH SERVICES | Facility: CLINIC | Age: 59
End: 2024-02-21
Payer: COMMERCIAL

## 2024-02-21 NOTE — PROGRESS NOTES
Patient came to [facility] for mask fitting appointment on February 21, 2024. Patient requested to switch masks because general discomfort . Discussed the following masks: Resmed Airfit/touch N20; Resmed Airfit N30i. Patient selected a Resmed Mask name: Airfit [id number] mask size Small, Medium

## 2024-05-21 ENCOUNTER — TELEPHONE (OUTPATIENT)
Dept: INTERNAL MEDICINE | Facility: CLINIC | Age: 59
End: 2024-05-21
Payer: COMMERCIAL

## 2024-05-21 NOTE — TELEPHONE ENCOUNTER
Returned call to Saundra, had to [facility] stating we didn't receive a form regarding work comp on this pt. Asked if she could please fax to [phone].

## 2024-05-21 NOTE — TELEPHONE ENCOUNTER
[facility] Call Center    Phone Message    May a detailed message be left on voicemail: yes     Reason for Call: Other: Saundra from works comp is calling regarding a provider form they have faxed over multiple times but have not gotten anything back yet. Please call back at [phone].      Action Taken: Other: PMR    Travel Screening: Not Applicable

## 2024-05-29 DIAGNOSIS — F41.9 ANXIETY: ICD-10-CM

## 2024-05-29 RX ORDER — ESCITALOPRAM OXALATE 10 MG/1
10 TABLET ORAL DAILY
Qty: 90 TABLET | Refills: 3 | Status: SHIPPED | OUTPATIENT
Start: 2024-05-29

## 2024-06-12 ENCOUNTER — TELEPHONE (OUTPATIENT)
Dept: PHYSICAL MEDICINE AND REHAB | Facility: CLINIC | Age: 59
End: 2024-06-12
Payer: COMMERCIAL

## 2024-06-12 NOTE — TELEPHONE ENCOUNTER
Returned call to Saundra, had to [facility] stating , I'm returning her call regarding this mutual pt.

## 2024-06-12 NOTE — TELEPHONE ENCOUNTER
[facility] Call Center    Phone Message    May a detailed message be left on voicemail: yes     Reason for Call: Other: Saundra is calling from [employer] and would like a call back from [employer].      Action Taken: Message routed to:  [facility] PM[facility]    Travel Screening: Not Applicable     Date of Service:

## 2024-06-12 NOTE — TELEPHONE ENCOUNTER
Returned call and Spoke to Saundra, she will be emailing over the forms that she would like Dr Melara review and complete, Once I receive the email I will forward the forms over to Dr Melara.

## 2024-06-12 NOTE — TELEPHONE ENCOUNTER
[facility] Call Center    Phone Message    May a detailed message be left on voicemail: yes     Reason for Call: Other: Saundra called back for [place], stated she will be available the rest of the day for a call back.      Action Taken: Message routed to:  [facility] PMR    Travel Screening: Not Applicable     Date of Service:

## 2024-06-12 NOTE — TELEPHONE ENCOUNTER
Form received via email from Saundra, I have forward this form onto Dr Melara via [employer] to review and complete if need be. Pleas allow 3-4 business days for all forms to be completed.

## 2024-07-08 DIAGNOSIS — E53.8 VITAMIN B12 DEFICIENCY (NON ANEMIC): ICD-10-CM

## 2024-07-10 RX ORDER — CYANOCOBALAMIN 1000 UG/ML
1 INJECTION, SOLUTION INTRAMUSCULAR; SUBCUTANEOUS
Qty: 1 ML | Refills: 19 | OUTPATIENT
Start: 2024-07-10

## 2024-07-31 ENCOUNTER — OFFICE VISIT (OUTPATIENT)
Dept: PEDIATRICS | Facility: CLINIC | Age: 59
End: 2024-07-31
Payer: COMMERCIAL

## 2024-07-31 VITALS
OXYGEN SATURATION: 96 % | HEIGHT: 71 IN | SYSTOLIC BLOOD PRESSURE: 154 MMHG | HEART RATE: 67 BPM | DIASTOLIC BLOOD PRESSURE: 94 MMHG | TEMPERATURE: 97.7 F | RESPIRATION RATE: 20 BRPM | BODY MASS INDEX: 31.3 KG/M2 | WEIGHT: 223.6 LBS

## 2024-07-31 DIAGNOSIS — Z00.00 ROUTINE GENERAL MEDICAL EXAMINATION AT A HEALTH CARE FACILITY: Primary | ICD-10-CM

## 2024-07-31 DIAGNOSIS — I10 ESSENTIAL HYPERTENSION: ICD-10-CM

## 2024-07-31 DIAGNOSIS — F41.9 ANXIETY: ICD-10-CM

## 2024-07-31 DIAGNOSIS — E53.8 VITAMIN B12 DEFICIENCY (NON ANEMIC): ICD-10-CM

## 2024-07-31 LAB
ALBUMIN SERPL BCG-MCNC: 4.4 G/DL (ref 3.5–5.2)
ALP SERPL-CCNC: 73 U/L (ref 40–150)
ALT SERPL W P-5'-P-CCNC: 33 U/L (ref 0–70)
ANION GAP SERPL CALCULATED.3IONS-SCNC: 10 MMOL/L (ref 7–15)
AST SERPL W P-5'-P-CCNC: 22 U/L (ref 0–45)
BILIRUB SERPL-MCNC: 0.4 MG/DL
BUN SERPL-MCNC: 13.6 MG/DL (ref 6–20)
CALCIUM SERPL-MCNC: 8.9 MG/DL (ref 8.8–10.4)
CHLORIDE SERPL-SCNC: 106 MMOL/L (ref 98–107)
CHOLEST SERPL-MCNC: 268 MG/DL
CREAT SERPL-MCNC: 0.87 MG/DL (ref 0.67–1.17)
CRP SERPL-MCNC: <3 MG/L
EGFRCR SERPLBLD CKD-EPI 2021: >90 ML/MIN/1.73M2
ERYTHROCYTE [DISTWIDTH] IN BLOOD BY AUTOMATED COUNT: 14.6 % (ref 10–15)
FASTING STATUS PATIENT QL REPORTED: YES
FASTING STATUS PATIENT QL REPORTED: YES
FERRITIN SERPL-MCNC: 49 NG/ML (ref 31–409)
GLUCOSE SERPL-MCNC: 93 MG/DL (ref 70–99)
HCO3 SERPL-SCNC: 23 MMOL/L (ref 22–29)
HCT VFR BLD AUTO: 46.6 % (ref 40–53)
HDLC SERPL-MCNC: 47 MG/DL
HGB BLD-MCNC: 15.5 G/DL (ref 13.3–17.7)
LDLC SERPL CALC-MCNC: 174 MG/DL
MCH RBC QN AUTO: 29.3 PG (ref 26.5–33)
MCHC RBC AUTO-ENTMCNC: 33.3 G/DL (ref 31.5–36.5)
MCV RBC AUTO: 88 FL (ref 78–100)
NONHDLC SERPL-MCNC: 221 MG/DL
PLATELET # BLD AUTO: 235 10E3/UL (ref 150–450)
POTASSIUM SERPL-SCNC: 4.1 MMOL/L (ref 3.4–5.3)
PROT SERPL-MCNC: 7.1 G/DL (ref 6.4–8.3)
PSA SERPL DL<=0.01 NG/ML-MCNC: 0.21 NG/ML (ref 0–3.5)
RBC # BLD AUTO: 5.29 10E6/UL (ref 4.4–5.9)
SODIUM SERPL-SCNC: 139 MMOL/L (ref 135–145)
T4 FREE SERPL-MCNC: 1.01 NG/DL (ref 0.9–1.7)
TRIGL SERPL-MCNC: 235 MG/DL
TSH SERPL DL<=0.005 MIU/L-ACNC: 0.04 UIU/ML (ref 0.3–4.2)
VIT B12 SERPL-MCNC: 843 PG/ML (ref 232–1245)
VIT D+METAB SERPL-MCNC: 23 NG/ML (ref 20–50)
WBC # BLD AUTO: 5.6 10E3/UL (ref 4–11)

## 2024-07-31 PROCEDURE — 99396 PREV VISIT EST AGE 40-64: CPT | Performed by: INTERNAL MEDICINE

## 2024-07-31 PROCEDURE — 84439 ASSAY OF FREE THYROXINE: CPT | Performed by: INTERNAL MEDICINE

## 2024-07-31 PROCEDURE — 85027 COMPLETE CBC AUTOMATED: CPT | Performed by: INTERNAL MEDICINE

## 2024-07-31 PROCEDURE — 82728 ASSAY OF FERRITIN: CPT | Performed by: INTERNAL MEDICINE

## 2024-07-31 PROCEDURE — 80053 COMPREHEN METABOLIC PANEL: CPT | Performed by: INTERNAL MEDICINE

## 2024-07-31 PROCEDURE — 99214 OFFICE O/P EST MOD 30 MIN: CPT | Mod: 25 | Performed by: INTERNAL MEDICINE

## 2024-07-31 PROCEDURE — 82607 VITAMIN B-12: CPT | Performed by: INTERNAL MEDICINE

## 2024-07-31 PROCEDURE — 80061 LIPID PANEL: CPT | Performed by: INTERNAL MEDICINE

## 2024-07-31 PROCEDURE — 82306 VITAMIN D 25 HYDROXY: CPT | Performed by: INTERNAL MEDICINE

## 2024-07-31 PROCEDURE — 36415 COLL VENOUS BLD VENIPUNCTURE: CPT | Performed by: INTERNAL MEDICINE

## 2024-07-31 PROCEDURE — G0103 PSA SCREENING: HCPCS | Performed by: INTERNAL MEDICINE

## 2024-07-31 PROCEDURE — 84443 ASSAY THYROID STIM HORMONE: CPT | Performed by: INTERNAL MEDICINE

## 2024-07-31 PROCEDURE — 86140 C-REACTIVE PROTEIN: CPT | Performed by: INTERNAL MEDICINE

## 2024-07-31 RX ORDER — BUPROPION HYDROCHLORIDE 150 MG/1
150 TABLET ORAL EVERY MORNING
Qty: 90 TABLET | Refills: 3 | Status: SHIPPED | OUTPATIENT
Start: 2024-07-31

## 2024-07-31 RX ORDER — LISINOPRIL 10 MG/1
10 TABLET ORAL DAILY
Qty: 90 TABLET | Refills: 3 | Status: SHIPPED | OUTPATIENT
Start: 2024-07-31

## 2024-07-31 SDOH — HEALTH STABILITY: PHYSICAL HEALTH: ON AVERAGE, HOW MANY DAYS PER WEEK DO YOU ENGAGE IN MODERATE TO STRENUOUS EXERCISE (LIKE A BRISK WALK)?: 3 DAYS

## 2024-07-31 ASSESSMENT — PAIN SCALES - GENERAL: PAINLEVEL: NO PAIN (0)

## 2024-07-31 ASSESSMENT — SOCIAL DETERMINANTS OF HEALTH (SDOH): HOW OFTEN DO YOU GET TOGETHER WITH FRIENDS OR RELATIVES?: TWICE A WEEK

## 2024-07-31 NOTE — PROGRESS NOTES
Preventive Care Visit  [facility]  Jose Enrique MD, Internal Medicine - Pediatrics  Jul 31, 2024      Assessment & Plan       ICD-10-CM    1. Routine general medical examination at a health care facility  Z00.00 Vitamin B12     Vitamin D Deficiency     Comprehensive metabolic panel (BMP + Alb, Alk Phos, ALT, AST, Total. Bili, TP)     CBC with platelets     CRP, inflammation     Ferritin     TSH     T4, free     Prostate Specific Antigen Screen     Lipid panel reflex to direct LDL Fasting     Vitamin B12     Vitamin D Deficiency     Comprehensive metabolic panel (BMP + Alb, Alk Phos, ALT, AST, Total. Bili, TP)     CBC with platelets     CRP, inflammation     Ferritin     TSH     T4, free     Prostate Specific Antigen Screen     Lipid panel reflex to direct LDL Fasting      2. Vitamin B12 deficiency (non anemic)  E53.8 Vitamin B12     Vitamin B12      3. Anxiety  F41.9 buPROPion (WELLBUTRIN XL) 150 MG 24 hr tablet      4. Essential hypertension  I10 lisinopril (ZESTRIL) 10 MG tablet        Here for CPE. Overall he is feeling well.  HM reviewed. Labwork ordered as noted above both for routine health maintenance as well as due to ongoing issues from long COVID.  Has hx of B12 deficiency, will update B12 level.    Anxiety. Has been under good control, but noting weight gain which may be related to Lexapro. We reviewed options. He is interested in trying Wellbutrin. Recommend cutting Lexapro dose in 1/2 for about 2 weeks then can stop as he starts Wellbutrin.   If this is not helpful, consider Buspar as an alternative.    HTN. BP is high today, has been high outside of clinic as well. Reviewed options.  Will start Lisinopril 10 mg daily.   Repeat BP in 2-3 weeks.     Jose Enrique MD       Tremaine Grace is a 58 year old, presenting for the following:  Physical (Lately had high blood pressure,  shortness of breath, right knee pain and both feet cramping every night..)        7/31/2024     8:51 AM    Additional Questions   Roomed by .HILARY           7/31/2024     8:51 AM   Patient Reported Additional Medications   Patient reports taking the following new medications no        Health Care Directive  Patient does not have a Health Care Directive or Living Will: Discussed advance care planning with patient; however, patient declined at this time.    HPI  Here for CPE. Overall he is feeling well.  We discussed his ongoing issues related to long COVID.     Has B12 deficiency. Is giving injections monthly. Has tried SL B12 as well.    Anxiety. Has been under good control with Lexapro 10 mg daily, but is noting weight gain and decreased libido. He is wondering if there are other options. We reviewed med options of trying weaning off meds, starting Buspar or starting Wellbutrin.     HTN. BP is high today. Has been high at outside visits as well. Has not been dx w/ HTN prior to today. No cardiac sx such as CP, palpitations, PND, orthopnea, MENDOSA or peripheral edema. We reviewed treatment options.             7/31/2024   General Health   How would you rate your overall physical health? Good   Feel stress (tense, anxious, or unable to sleep) Only a little      (!) STRESS CONCERN      7/31/2024   Nutrition   Three or more servings of calcium each day? Yes   Diet: Regular (no restrictions)   How many servings of fruit and vegetables per day? (!) 2-3   How many sweetened beverages each day? 0-1            7/31/2024   Exercise   Days per week of moderate/strenous exercise 3 days            7/31/2024   Social Factors   Frequency of gathering with friends or relatives Twice a week   Worry food won't last until get money to buy more No   Food not last or not have enough money for food? No   Do you have housing? (Housing is defined as stable permanent housing and does not include staying ouside in a car, in a tent, in an abandoned building, in an overnight shelter, or couch-surfing.) Yes   Are you worried about losing your housing?  "No   Lack of transportation? No   Unable to get utilities (heat,electricity)? No            7/31/2024   Fall Risk   Fallen 2 or more times in the past year? No   Trouble with walking or balance? No             7/31/2024   Dental   Dentist two times every year? Yes            7/31/2024   TB Screening   Were you born outside of the [place]? No            Today's PHQ-2 Score:       7/31/2024     8:46 AM   PHQ-2 ( 1999 Pfizer)   Q1: Little interest or pleasure in doing things 0   Q2: Feeling down, depressed or hopeless 0   PHQ-2 Score 0   Q1: Little interest or pleasure in doing things Not at all   Q2: Feeling down, depressed or hopeless Not at all   PHQ-2 Score 0           7/31/2024   Substance Use   Alcohol more than 3/day or more than 7/wk No   Do you use any other substances recreationally? No        Social History     Tobacco Use    Smoking status: Never     Passive exposure: Never    Smokeless tobacco: Never   Vaping Use    Vaping status: Never Used   Substance Use Topics    Alcohol use: Yes     Alcohol/week: 0.0 standard drinks of alcohol     Comment: 2 x week    Drug use: No           7/31/2024   STI Screening   New sexual partner(s) since last STI/HIV test? No      Last PSA:   PSA   Date Value Ref Range Status   10/16/2019 0.30 0 - 4 ug/L Final     Comment:     Assay Method:  Chemiluminescence using Siemens Vista analyzer     ASCVD Risk   The 10-year ASCVD risk score (Karmen LUZ, et al., 2019) is: 12%    Values used to calculate the score:      Age: 58 years      Sex: Male      Is Non- : No      Diabetic: No      Tobacco smoker: No      Systolic Blood Pressure: 154 mmHg      Is BP treated: No      HDL Cholesterol: 50 mg/dL      Total Cholesterol: 242 mg/dL         Objective    Exam  BP (!) 154/94   Pulse 67   Temp 97.7  F (36.5  C) (Oral)   Resp 20   Ht 1.812 m (5' 11.34\")   Wt 101.4 kg (223 lb 9.6 oz)   SpO2 96%   BMI 30.89 kg/m     Estimated body mass index is 30.89 kg/m  as " "calculated from the following:    Height as of this encounter: 1.812 m (5' 11.34\").    Weight as of this encounter: 101.4 kg (223 lb 9.6 oz).    Physical Exam  GENERAL: healthy, alert and no distress  EYES: PERRL, EOMI  HENT: ear canals and TM's normal. No nasal discharge. OP moist.  NECK: no adenopathy  RESP: lungs clear to auscultation - no rales, rhonchi or wheezes  CV: regular rate and rhythm, normal S1 S2, no murmur, no peripheral edema  ABDOMEN: soft, nontender, bowel sounds normal  MS: no gross musculoskeletal defects noted  SKIN: no suspicious lesions or rashes  NEURO: Normal strength and tone  PSYCH: mentation appears normal, affect normal         Signed Electronically by: Jose Enrique MD    "

## 2024-08-01 DIAGNOSIS — R94.6 ABNORMAL FINDING ON THYROID FUNCTION TEST: Primary | ICD-10-CM

## 2024-08-01 PROBLEM — E78.5 HYPERLIPIDEMIA LDL GOAL <100: Status: ACTIVE | Noted: 2024-08-01

## 2024-08-02 PROBLEM — G63 VITAMIN B12 DEFICIENCY NEUROPATHY (H): Status: RESOLVED | Noted: 2023-03-13 | Resolved: 2024-08-02

## 2024-08-02 PROBLEM — E53.8 VITAMIN B12 DEFICIENCY NEUROPATHY (H): Status: RESOLVED | Noted: 2023-03-13 | Resolved: 2024-08-02

## 2024-08-15 ENCOUNTER — TELEPHONE (OUTPATIENT)
Dept: PHYSICAL MEDICINE AND REHAB | Facility: CLINIC | Age: 59
End: 2024-08-15
Payer: COMMERCIAL

## 2024-08-15 NOTE — TELEPHONE ENCOUNTER
[facility] Call Center    Phone Message    May a detailed message be left on voicemail: yes     Reason for Call: Saundra from [employer] called to check on the status of Health Care Provider Report.  She sent this to Morenita back on 6/12 and hasn't gotten it back yet.  Please call Saundra to let her know what the status is.  Thanks.

## 2024-08-16 NOTE — TELEPHONE ENCOUNTER
Returned call to иван Arugello to [facility]. Stated form was sent to Dr Melara via [employer] twice once on 6/12 and again on July 30th. I have also routed this message to Dr Melara for status of the form,as well as  I have asked Ethan to reach out to Feli SANCHEZ (manager if she had any further question)

## 2024-09-17 ENCOUNTER — DOCUMENTATION ONLY (OUTPATIENT)
Dept: INTERNAL MEDICINE | Facility: CLINIC | Age: 59
End: 2024-09-17
Payer: COMMERCIAL

## 2024-09-17 NOTE — PROGRESS NOTES
Type of Form Received:  Health Care Provider Report    Form Received (Date) 9/16/24   Form Filled out No   Placed in provider folder Yes

## 2024-09-17 NOTE — TELEPHONE ENCOUNTER
[facility] Call Center    Phone Message    May a detailed message be left on voicemail: yes     Reason for Call: Other: Saundra is calling back and either needs the Forms filled out and returned back to [employer] or a call back from Dr. Melara. Please review and call back to discuss.      Action Taken: Message routed to:  [facility] Post Covid Clinic    Travel Screening: Not Applicable     Date of Service:

## 2024-09-19 NOTE — TELEPHONE ENCOUNTER
Spoke with Saundra, two days ago, letting her know that the form has been faxed to Dr Melara at a different fax number for review and completion. Fax# [phone].

## 2024-09-24 NOTE — TELEPHONE ENCOUNTER
Attempted to contact Saundra at [employer] regarding patients forms.  Unable to leave a message due to voice mailbox being full.  Will try again at a later date/time.  Message was also sent to Saundra today.     Rozina Fall RN on 9/24/2024 at 1:04 PM

## 2024-09-27 NOTE — TELEPHONE ENCOUNTER
Attempted to contact Saundra @ risk management again, mail box if full unable to leave a message.  Sent a message in epic.    Rozina Fall RN on 9/27/2024 at 12:11 PM

## 2024-10-22 ENCOUNTER — TELEPHONE (OUTPATIENT)
Dept: PEDIATRICS | Facility: CLINIC | Age: 59
End: 2024-10-22
Payer: COMMERCIAL

## 2024-10-22 NOTE — TELEPHONE ENCOUNTER
Forms/Letter Request    Type of form/letter: OTHER: WC Provider Report       Do we have the form/letter: Yes:     Who is the form from? Work [employer] (if other please explain)    Where did/will the form come from? form was faxed in    When is form/letter needed by: asap    How would you like the form/letter returned: Fax : [phone] - Attn: Dr. Melara    Patient Notified form requests are processed in 5-7 business days:Yes    Could we send this information to you in [employer] or would you prefer to receive a phone call?:   No preference   Okay to leave a detailed message?: Yes at Home number on file [phone] (home)

## 2025-02-04 ENCOUNTER — LAB (OUTPATIENT)
Dept: LAB | Facility: CLINIC | Age: 60
End: 2025-02-04
Payer: COMMERCIAL

## 2025-02-04 DIAGNOSIS — R94.6 ABNORMAL FINDING ON THYROID FUNCTION TEST: ICD-10-CM

## 2025-02-04 DIAGNOSIS — E78.5 HYPERLIPIDEMIA LDL GOAL <100: ICD-10-CM

## 2025-02-04 LAB
ALT SERPL W P-5'-P-CCNC: 32 U/L (ref 0–70)
CHOLEST SERPL-MCNC: 196 MG/DL
FASTING STATUS PATIENT QL REPORTED: YES
HDLC SERPL-MCNC: 49 MG/DL
LDLC SERPL CALC-MCNC: 100 MG/DL
NONHDLC SERPL-MCNC: 147 MG/DL
T3FREE SERPL-MCNC: 3.4 PG/ML (ref 2–4.4)
T4 FREE SERPL-MCNC: 1.01 NG/DL (ref 0.9–1.7)
TRIGL SERPL-MCNC: 235 MG/DL
TSH SERPL DL<=0.005 MIU/L-ACNC: 2.15 UIU/ML (ref 0.3–4.2)

## 2025-02-04 PROCEDURE — 84443 ASSAY THYROID STIM HORMONE: CPT

## 2025-02-04 PROCEDURE — 84481 FREE ASSAY (FT-3): CPT

## 2025-02-04 PROCEDURE — 80061 LIPID PANEL: CPT

## 2025-02-04 PROCEDURE — 84439 ASSAY OF FREE THYROXINE: CPT

## 2025-02-04 PROCEDURE — 84460 ALANINE AMINO (ALT) (SGPT): CPT

## 2025-02-04 PROCEDURE — 36415 COLL VENOUS BLD VENIPUNCTURE: CPT

## 2025-02-05 ENCOUNTER — NURSE TRIAGE (OUTPATIENT)
Dept: PEDIATRICS | Facility: CLINIC | Age: 60
End: 2025-02-05
Payer: COMMERCIAL

## 2025-02-05 DIAGNOSIS — R13.10 DYSPHAGIA, UNSPECIFIED TYPE: Primary | ICD-10-CM

## 2025-02-05 NOTE — TELEPHONE ENCOUNTER
"Nurse Triage SBAR    Is this a 2nd Level Triage? YES, LICENSED PRACTITIONER REVIEW IS REQUIRED    Situation: RN called and spoke with patient to get further clarification on difficulty swallowing symptom in MyChart message.     Background: Patient has been experiencing this symptom for \"some time now\".     Assessment: Patient denies choking. Patient states when eating or drinking, he will feel something in his trachea and will cough a lot, \"like a grain of rice or something like that\". \"Maybe I eat too fast, but I think it happens enough that we should do some testing\". Patient notes this happens every other day. Patient will start to \"hyper salivate\" and vomit within 20-30 min of coughing episode. No nausea associated. Vomiting does not happen each time, seems to happen about once a week.     Patient denies any difficulty breathing. No chest pain. No sore throat. No abdominal pain.     Protocol Recommended Disposition:   Discuss With PCP And Callback By Nurse Within 1 Hour    Recommendation: Second level triage requested per protocol. RN huddled w/ PCP. Will route encounter and provider will place order/referral as needed.     Routed to provider    Does the patient meet one of the following criteria for ADS visit consideration? 16+ years old, with an [facility] PCP     TIP  Providers, please consider if this condition is appropriate for management at one of our Acute and Diagnostic Services sites.     If patient is a good candidate, please use dotphrase <dot>triageresponse and select Refer to ADS to document.      Reason for Disposition   Coughing spells occur during or within 2 hours after eating/feedings    Additional Information   Negative: SEVERE difficulty swallowing (e.g., drooling or spitting) and started suddenly after taking a medicine or allergic foods   Negative: Wheezing, stridor, hoarseness, or difficulty breathing   Negative: Swollen tongue and sudden onset   Negative: Sounds like a life-threatening " emergency to the triager   Negative: Sore throat (throat pain with swallowing)   Negative: SEVERE difficulty swallowing (e.g., drooling or spitting, can't swallow water)   Negative: Symptoms of food or bone stuck in throat or esophagus (e.g., pain in throat or chest, FB sensation, blood-tinged saliva)   Negative: SEVERE symptoms of pill stuck in throat or esophagus (e.g., severe pain, bleeding, or difficulty swallowing liquids)   Negative: Drinking very little and dehydration suspected (e.g., no urine > 12 hours, very dry mouth, very lightheaded)   Negative: Refuses to drink anything for > 12 hours   Negative: Patient sounds very sick or weak to the triager   Negative: Fever > 100.4 F (38.0 C)    Protocols used: Swallowing Difficulty-COOKIE MILES RN 2/5/2025 at 1:26 PM

## 2025-03-05 ENCOUNTER — HOSPITAL ENCOUNTER (OUTPATIENT)
Dept: SPEECH THERAPY | Facility: HOSPITAL | Age: 60
Discharge: HOME | End: 2025-03-05
Attending: INTERNAL MEDICINE
Payer: COMMERCIAL

## 2025-03-05 ENCOUNTER — HOSPITAL ENCOUNTER (OUTPATIENT)
Dept: RADIOLOGY | Facility: HOSPITAL | Age: 60
Discharge: HOME | End: 2025-03-05
Attending: INTERNAL MEDICINE
Payer: COMMERCIAL

## 2025-03-05 DIAGNOSIS — R13.10 DYSPHAGIA, UNSPECIFIED TYPE: ICD-10-CM

## 2025-03-05 PROCEDURE — 92610 EVALUATE SWALLOWING FUNCTION: CPT | Mod: GN

## 2025-03-05 PROCEDURE — 92611 MOTION FLUOROSCOPY/SWALLOW: CPT | Mod: GN

## 2025-03-05 PROCEDURE — 74230 X-RAY XM SWLNG FUNCJ C+: CPT

## 2025-03-05 NOTE — PROGRESS NOTES
"SPEECH LANGUAGE PATHOLOGY EVALUATION           Subjective   Presenting condition or subjective complaint:  Per RN note on 2/5/25 \"Patient denies choking. Patient states when eating or drinking, he will feel something in his trachea and will cough a lot, \"like a grain of rice or something like that\". \"Maybe I eat too fast, but I think it happens enough that we should do some testing\". Patient notes this happens every other day. Patient will start to \"hyper salivate\" and vomit within 20-30 min of coughing episode. No nausea associated. Vomiting does not happen each time, seems to happen about once a week.      Patient denies any difficulty breathing. No chest pain. No sore throat. No abdominal pain. \"    Date of service: 3/5/25  Relevant medical/dysphagia history:   Pt denies recurrent respiratory illness such as pneumonia. He reports frequent throat clearing and cough. This is typically around meal time or after. Symptoms are occurring nearly everyday but are not always severe. He reports occasional aspiration events with saliva, possibly more frequent when head is in tucked posture.     Objective     SWALLOW EVALUATION  Dysphagia history: See above.  Current Diet/Method of Nutritional Intake: thin liquids (level 0), regular diet        CLINICAL SWALLOW EVALUATION  Oral Motor Function: Dentition: adequate dentition  Secretion management: WFL  Mucosal quality: good  Mandibular function: intact  Oral labial function: WFL  Lingual function: WFL  Velar function: intact   Buccal function: intact  Laryngeal function: throat clear, volitional swallow, voicing WFL     Level of assist required for feeding: no assistance needed   Textures Trialed:   Clinical Swallow Eval: Thin Liquids  Mode of presentation: cup, straw, self-fed   Volume presented: 4oz  Preparatory Phase: WFL  Oral Phase: WFL  Pharyngeal phase of swallow: intact   Diagnostic statement: WNL    VIDEOFLUOROSCOPIC SWALLOW STUDY  Radiologist: Dr. Acosta  Views " Taken: left lateral, A/P   Physical location of procedure: [place]'s    VFSS textures trialed:   VFSS Eval: Thin Liquids  Mode of Presentation: cup, straw   Order of Presentation: 1,2,3  Preparatory Phase: WFL  Oral Phase: WFL  Bolus Location When Swallow Initiated: posterior angle of ramus  Pharyngeal Phase: WFL  Rosenbeck's Penetration Aspiration Scale: 1 - no aspiration, contrast does not enter airway  Diagnostic Statement: WNL    VFSS Eval: Solids  Mode of Presentation: spoon   Order of Presentation: 4 (not captured with fluoroscopy), 5  Preparatory Phase: WFL  Oral Phase: WFL  Bolus Location When Swallow Initiated: valleculae  Pharyngeal Phase: WFL   Rosenbeck s Penetration Aspiration Scale: 1 - no aspiration, contrast does not enter airway  Diagnostic Statement: WNL    VFSS Eval: Barium Tablet  Mode of Presentation: cup   Order of Presentation: 6  Preparatory Phase: WFL  Oral Phase: WFL  Diagnostic Statement: A-P view with esophageal sweep. WNL.    ESOPHAGEAL PHASE OF SWALLOW  patient presents with symptoms of esophageal dysphagia  esophageal sweep performed during today's videofluoroscopic exam  please refer to radiologist's report for details     SWALLOW ASSESSMENT CLINICAL IMPRESSIONS AND RATIONALE  Diet Consistency Recommendations: thin liquids (level 0), regular diet    Recommended Feeding/Eating Techniques: small bolus size, slow rate of intake, maintain upright sitting position for eating   Medication Administration Recommendations: Patient preference    Assessment & Plan   CLINICAL IMPRESSIONS   Impression/Assessment: Video Swallow Study completed. Patient had no aspiration or penetration. Oropharyngeal swallow function is WNL. Tongue base retraction is WNL. Pharyngeal constriction, hyolaryngeal elevation and excursion were all WNL. Epiglottic inversion is complete. Swallow response is timely. Mastication is safe and complete. Cricopharyngeal function is WFL.  Reviewed results with pt following the  study.    PLAN OF CARE    Recommended Referrals to Other Professionals: Pending results of ENT appointment (scheduled next week per pt) consider Gastroenterology/Esophageal clinic.    Education Assessment:   Learner/Method: Patient;Listening;Pictures/Video;No Barriers to Learning    Risks and benefits of evaluation/treatment have been explained.   Patient/Family/caregiver agrees with Plan of Care.     Evaluation Time:    SLP Eval: oral/pharyngeal swallow function, clinical minutes (50791): 15  SLP Eval: VideoFluoroscopic Swallow function Minutes (92765): 10      Signing Clinician: Viktoria Olson, SLP

## 2025-03-12 ENCOUNTER — TRANSFERRED RECORDS (OUTPATIENT)
Dept: HEALTH INFORMATION MANAGEMENT | Facility: CLINIC | Age: 60
End: 2025-03-12
Payer: COMMERCIAL

## 2025-05-10 DIAGNOSIS — F41.9 ANXIETY: ICD-10-CM

## 2025-05-12 RX ORDER — ESCITALOPRAM OXALATE 10 MG/1
10 TABLET ORAL DAILY
Qty: 90 TABLET | Refills: 4 | Status: SHIPPED | OUTPATIENT
Start: 2025-05-12

## 2025-05-24 ENCOUNTER — TRANSFERRED RECORDS (OUTPATIENT)
Dept: HEALTH INFORMATION MANAGEMENT | Facility: CLINIC | Age: 60
End: 2025-05-24
Payer: COMMERCIAL

## 2025-07-07 ENCOUNTER — PATIENT OUTREACH (OUTPATIENT)
Dept: CARE COORDINATION | Facility: CLINIC | Age: 60
End: 2025-07-07
Payer: COMMERCIAL

## 2025-07-12 DIAGNOSIS — F41.9 ANXIETY: ICD-10-CM

## 2025-07-12 DIAGNOSIS — I10 ESSENTIAL HYPERTENSION: ICD-10-CM

## 2025-07-14 RX ORDER — BUPROPION HYDROCHLORIDE 150 MG/1
150 TABLET ORAL EVERY MORNING
Qty: 30 TABLET | Refills: 11 | Status: SHIPPED | OUTPATIENT
Start: 2025-07-14

## 2025-07-14 RX ORDER — LISINOPRIL 10 MG/1
10 TABLET ORAL DAILY
Qty: 60 TABLET | Refills: 0 | Status: SHIPPED | OUTPATIENT
Start: 2025-07-14

## 2025-08-06 DIAGNOSIS — I10 ESSENTIAL HYPERTENSION: ICD-10-CM

## 2025-08-06 RX ORDER — LISINOPRIL 10 MG/1
10 TABLET ORAL DAILY
Qty: 90 TABLET | Refills: 1 | OUTPATIENT
Start: 2025-08-06

## 2025-08-10 DIAGNOSIS — E78.5 HYPERLIPIDEMIA LDL GOAL <100: ICD-10-CM

## 2025-08-11 RX ORDER — ATORVASTATIN CALCIUM 20 MG/1
20 TABLET, FILM COATED ORAL DAILY
Qty: 30 TABLET | Refills: 0 | Status: SHIPPED | OUTPATIENT
Start: 2025-08-11

## 2025-08-27 ENCOUNTER — OFFICE VISIT (OUTPATIENT)
Dept: PEDIATRICS | Facility: CLINIC | Age: 60
End: 2025-08-27
Payer: COMMERCIAL

## 2025-08-27 VITALS
DIASTOLIC BLOOD PRESSURE: 75 MMHG | HEART RATE: 66 BPM | OXYGEN SATURATION: 98 % | WEIGHT: 220.5 LBS | SYSTOLIC BLOOD PRESSURE: 134 MMHG | BODY MASS INDEX: 30.87 KG/M2 | TEMPERATURE: 97 F | RESPIRATION RATE: 20 BRPM | HEIGHT: 71 IN

## 2025-08-27 DIAGNOSIS — F41.9 ANXIETY: ICD-10-CM

## 2025-08-27 DIAGNOSIS — Z00.00 ROUTINE GENERAL MEDICAL EXAMINATION AT A HEALTH CARE FACILITY: Primary | ICD-10-CM

## 2025-08-27 DIAGNOSIS — E78.5 HYPERLIPIDEMIA LDL GOAL <100: ICD-10-CM

## 2025-08-27 DIAGNOSIS — I10 ESSENTIAL HYPERTENSION: ICD-10-CM

## 2025-08-27 PROCEDURE — 99396 PREV VISIT EST AGE 40-64: CPT | Performed by: INTERNAL MEDICINE

## 2025-08-27 PROCEDURE — 1126F AMNT PAIN NOTED NONE PRSNT: CPT | Performed by: INTERNAL MEDICINE

## 2025-08-27 PROCEDURE — 3078F DIAST BP <80 MM HG: CPT | Performed by: INTERNAL MEDICINE

## 2025-08-27 PROCEDURE — 3075F SYST BP GE 130 - 139MM HG: CPT | Performed by: INTERNAL MEDICINE

## 2025-08-27 RX ORDER — BUPROPION HYDROCHLORIDE 300 MG/1
300 TABLET ORAL EVERY MORNING
Qty: 30 TABLET | Refills: 12 | Status: SHIPPED | OUTPATIENT
Start: 2025-08-27

## 2025-08-27 RX ORDER — LOSARTAN POTASSIUM 25 MG/1
25 TABLET ORAL DAILY
Qty: 30 TABLET | Refills: 12 | Status: SHIPPED | OUTPATIENT
Start: 2025-08-27

## 2025-08-27 RX ORDER — ATORVASTATIN CALCIUM 20 MG/1
20 TABLET, FILM COATED ORAL DAILY
Qty: 30 TABLET | Refills: 12 | Status: SHIPPED | OUTPATIENT
Start: 2025-08-27

## 2025-08-27 SDOH — HEALTH STABILITY: PHYSICAL HEALTH: ON AVERAGE, HOW MANY DAYS PER WEEK DO YOU ENGAGE IN MODERATE TO STRENUOUS EXERCISE (LIKE A BRISK WALK)?: 4 DAYS

## 2025-08-27 SDOH — HEALTH STABILITY: PHYSICAL HEALTH: ON AVERAGE, HOW MANY MINUTES DO YOU ENGAGE IN EXERCISE AT THIS LEVEL?: 30 MIN

## 2025-08-27 ASSESSMENT — PAIN SCALES - GENERAL: PAINLEVEL_OUTOF10: NO PAIN (0)
